# Patient Record
Sex: FEMALE | Race: OTHER | NOT HISPANIC OR LATINO
[De-identification: names, ages, dates, MRNs, and addresses within clinical notes are randomized per-mention and may not be internally consistent; named-entity substitution may affect disease eponyms.]

---

## 2019-04-23 ENCOUNTER — APPOINTMENT (OUTPATIENT)
Dept: CARDIOLOGY | Facility: CLINIC | Age: 59
End: 2019-04-23
Payer: COMMERCIAL

## 2019-04-23 ENCOUNTER — APPOINTMENT (OUTPATIENT)
Dept: ENDOCRINOLOGY | Facility: CLINIC | Age: 59
End: 2019-04-23
Payer: COMMERCIAL

## 2019-04-23 ENCOUNTER — RX CHANGE (OUTPATIENT)
Age: 59
End: 2019-04-23

## 2019-04-23 VITALS — SYSTOLIC BLOOD PRESSURE: 138 MMHG | DIASTOLIC BLOOD PRESSURE: 84 MMHG

## 2019-04-23 VITALS
TEMPERATURE: 98.4 F | SYSTOLIC BLOOD PRESSURE: 140 MMHG | BODY MASS INDEX: 26.4 KG/M2 | HEIGHT: 63 IN | HEART RATE: 105 BPM | DIASTOLIC BLOOD PRESSURE: 85 MMHG | WEIGHT: 149 LBS | OXYGEN SATURATION: 96 %

## 2019-04-23 DIAGNOSIS — R09.89 OTHER SPECIFIED SYMPTOMS AND SIGNS INVOLVING THE CIRCULATORY AND RESPIRATORY SYSTEMS: ICD-10-CM

## 2019-04-23 LAB — GLUCOSE BLDC GLUCOMTR-MCNC: 126

## 2019-04-23 PROCEDURE — 93925 LOWER EXTREMITY STUDY: CPT

## 2019-04-23 PROCEDURE — 93880 EXTRACRANIAL BILAT STUDY: CPT

## 2019-04-23 PROCEDURE — 83036 HEMOGLOBIN GLYCOSYLATED A1C: CPT | Mod: QW

## 2019-04-23 PROCEDURE — 36415 COLL VENOUS BLD VENIPUNCTURE: CPT

## 2019-04-23 PROCEDURE — 82962 GLUCOSE BLOOD TEST: CPT

## 2019-04-23 PROCEDURE — 99204 OFFICE O/P NEW MOD 45 MIN: CPT | Mod: 25

## 2019-04-28 LAB
25(OH)D3 SERPL-MCNC: 53.8 NG/ML
ALBUMIN SERPL ELPH-MCNC: 4.9 G/DL
ALP BLD-CCNC: 100 U/L
ALT SERPL-CCNC: 23 U/L
ANION GAP SERPL CALC-SCNC: 15 MMOL/L
AST SERPL-CCNC: 15 U/L
BASOPHILS # BLD AUTO: 0.04 K/UL
BASOPHILS NFR BLD AUTO: 0.4 %
BILIRUB SERPL-MCNC: 0.3 MG/DL
BUN SERPL-MCNC: 21 MG/DL
CALCIUM SERPL-MCNC: 10.3 MG/DL
CHLORIDE SERPL-SCNC: 107 MMOL/L
CO2 SERPL-SCNC: 24 MMOL/L
CREAT SERPL-MCNC: 0.92 MG/DL
CREAT SPEC-SCNC: 107 MG/DL
EOSINOPHIL # BLD AUTO: 0.08 K/UL
EOSINOPHIL NFR BLD AUTO: 0.9 %
ESTIMATED AVERAGE GLUCOSE: 180 MG/DL
FRUCTOSAMINE SERPL-MCNC: 328 UMOL/L
GLUCOSE SERPL-MCNC: 128 MG/DL
GLYCOMARK.: 5.2 UG/ML
HBA1C MFR BLD HPLC: 7.9 %
HBA1C MFR BLD HPLC: 8
HCT VFR BLD CALC: 40.9 %
HDLC SERPL-MCNC: 54 MG/DL
HGB BLD-MCNC: 13.3 G/DL
IMM GRANULOCYTES NFR BLD AUTO: 0.4 %
LDLC SERPL DIRECT ASSAY-MCNC: 115 MG/DL
LYMPHOCYTES # BLD AUTO: 1.52 K/UL
LYMPHOCYTES NFR BLD AUTO: 16.4 %
MAN DIFF?: NORMAL
MCHC RBC-ENTMCNC: 28.9 PG
MCHC RBC-ENTMCNC: 32.5 GM/DL
MCV RBC AUTO: 88.9 FL
MICROALBUMIN 24H UR DL<=1MG/L-MCNC: 2.8 MG/DL
MICROALBUMIN/CREAT 24H UR-RTO: 26 MG/G
MONOCYTES # BLD AUTO: 0.68 K/UL
MONOCYTES NFR BLD AUTO: 7.3 %
NEUTROPHILS # BLD AUTO: 6.92 K/UL
NEUTROPHILS NFR BLD AUTO: 74.6 %
PLATELET # BLD AUTO: 350 K/UL
POTASSIUM SERPL-SCNC: 4.4 MMOL/L
PROT SERPL-MCNC: 7.8 G/DL
RBC # BLD: 4.6 M/UL
RBC # FLD: 14.8 %
SODIUM SERPL-SCNC: 146 MMOL/L
T4 FREE SERPL-MCNC: 1.1 NG/DL
TRIGL SERPL-MCNC: 112 MG/DL
TSH SERPL-ACNC: 1.64 UIU/ML
WBC # FLD AUTO: 9.28 K/UL

## 2019-05-06 ENCOUNTER — RECORD ABSTRACTING (OUTPATIENT)
Age: 59
End: 2019-05-06

## 2019-05-06 DIAGNOSIS — E66.3 OVERWEIGHT: ICD-10-CM

## 2019-05-06 DIAGNOSIS — Z87.898 PERSONAL HISTORY OF OTHER SPECIFIED CONDITIONS: ICD-10-CM

## 2019-05-06 DIAGNOSIS — Z80.42 FAMILY HISTORY OF MALIGNANT NEOPLASM OF PROSTATE: ICD-10-CM

## 2019-05-06 DIAGNOSIS — Z83.3 FAMILY HISTORY OF DIABETES MELLITUS: ICD-10-CM

## 2019-05-06 DIAGNOSIS — M79.603 PAIN IN ARM, UNSPECIFIED: ICD-10-CM

## 2019-08-06 ENCOUNTER — APPOINTMENT (OUTPATIENT)
Dept: ENDOCRINOLOGY | Facility: CLINIC | Age: 59
End: 2019-08-06
Payer: COMMERCIAL

## 2019-08-06 VITALS
WEIGHT: 149 LBS | SYSTOLIC BLOOD PRESSURE: 150 MMHG | HEART RATE: 96 BPM | OXYGEN SATURATION: 97 % | HEIGHT: 63 IN | DIASTOLIC BLOOD PRESSURE: 90 MMHG | BODY MASS INDEX: 26.4 KG/M2

## 2019-08-06 VITALS — DIASTOLIC BLOOD PRESSURE: 78 MMHG | SYSTOLIC BLOOD PRESSURE: 122 MMHG

## 2019-08-06 DIAGNOSIS — Z13.820 ENCOUNTER FOR SCREENING FOR OSTEOPOROSIS: ICD-10-CM

## 2019-08-06 LAB
ALBUMIN: 30
CREATININE: 100
GLUCOSE BLDC GLUCOMTR-MCNC: 88
HBA1C MFR BLD HPLC: 7.8
MICROALBUMIN/CREAT UR TEST STR-RTO: <30

## 2019-08-06 PROCEDURE — 36415 COLL VENOUS BLD VENIPUNCTURE: CPT

## 2019-08-06 PROCEDURE — 83036 HEMOGLOBIN GLYCOSYLATED A1C: CPT | Mod: QW

## 2019-08-06 PROCEDURE — 82962 GLUCOSE BLOOD TEST: CPT

## 2019-08-06 PROCEDURE — 82044 UR ALBUMIN SEMIQUANTITATIVE: CPT | Mod: QW

## 2019-08-06 PROCEDURE — 99214 OFFICE O/P EST MOD 30 MIN: CPT | Mod: 25

## 2019-08-06 RX ORDER — VITAMIN K2 90 MCG
125 MCG CAPSULE ORAL
Qty: 30 | Refills: 2 | Status: ACTIVE | COMMUNITY
Start: 2019-08-06

## 2019-08-06 RX ORDER — METFORMIN HYDROCHLORIDE 500 MG/1
500 TABLET, COATED ORAL DAILY
Refills: 0 | Status: DISCONTINUED | COMMUNITY
End: 2019-08-06

## 2019-08-28 LAB
25(OH)D3 SERPL-MCNC: 38.7 NG/ML
ALBUMIN SERPL ELPH-MCNC: 4.7 G/DL
ALP BLD-CCNC: 89 U/L
ALT SERPL-CCNC: 15 U/L
ANION GAP SERPL CALC-SCNC: 11 MMOL/L
AST SERPL-CCNC: 13 U/L
BILIRUB SERPL-MCNC: 0.3 MG/DL
BUN SERPL-MCNC: 17 MG/DL
CALCIUM SERPL-MCNC: 9.9 MG/DL
CHLORIDE SERPL-SCNC: 104 MMOL/L
CO2 SERPL-SCNC: 27 MMOL/L
CREAT SERPL-MCNC: 0.94 MG/DL
CREAT SPEC-SCNC: 108 MG/DL
ESTIMATED AVERAGE GLUCOSE: 154 MG/DL
FRUCTOSAMINE SERPL-MCNC: 273 UMOL/L
GLUCOSE SERPL-MCNC: 92 MG/DL
GLYCOMARK.: 11.4 UG/ML
HBA1C MFR BLD HPLC: 7 %
HDLC SERPL-MCNC: 47 MG/DL
LDLC SERPL DIRECT ASSAY-MCNC: 103 MG/DL
MICROALBUMIN 24H UR DL<=1MG/L-MCNC: 1.4 MG/DL
MICROALBUMIN/CREAT 24H UR-RTO: 13 MG/G
POTASSIUM SERPL-SCNC: 4.7 MMOL/L
PROT SERPL-MCNC: 7.7 G/DL
SODIUM SERPL-SCNC: 142 MMOL/L
T4 FREE SERPL-MCNC: 1 NG/DL
TRIGL SERPL-MCNC: 147 MG/DL
TSH SERPL-ACNC: 1.78 UIU/ML

## 2019-09-01 NOTE — PHYSICAL EXAM
[Alert] : alert [Well Nourished] : well nourished [No Acute Distress] : no acute distress [Well Developed] : well developed [Normal Sclera/Conjunctiva] : normal sclera/conjunctiva [EOMI] : extra ocular movement intact [No Proptosis] : no proptosis [Thyroid Not Enlarged] : the thyroid was not enlarged [Normal Oropharynx] : the oropharynx was normal [No Thyroid Nodules] : there were no palpable thyroid nodules [No Respiratory Distress] : no respiratory distress [No Accessory Muscle Use] : no accessory muscle use [Clear to Auscultation] : lungs were clear to auscultation bilaterally [Normal Rate] : heart rate was normal  [Normal S1, S2] : normal S1 and S2 [Pedal Pulses Normal] : the pedal pulses are present [Regular Rhythm] : with a regular rhythm [No Edema] : there was no peripheral edema [Normal Bowel Sounds] : normal bowel sounds [Soft] : abdomen soft [Not Tender] : non-tender [Not Distended] : not distended [Post Cervical Nodes] : posterior cervical nodes [Anterior Cervical Nodes] : anterior cervical nodes [Axillary Nodes] : axillary nodes [Normal] : normal and non tender [No Spinal Tenderness] : no spinal tenderness [No Stigmata of Cushings Syndrome] : no stigmata of cushings syndrome [Spine Straight] : spine straight [Normal Gait] : normal gait [No Rash] : no rash [Normal Strength/Tone] : muscle strength and tone were normal [Normal Reflexes] : deep tendon reflexes were 2+ and symmetric [No Tremors] : no tremors [Oriented x3] : oriented to person, place, and time [Acanthosis Nigricans] : no acanthosis nigricans

## 2019-09-01 NOTE — HISTORY OF PRESENT ILLNESS
[FreeTextEntry1] :    returns today in follow up with regard to a history of type 2 diabetes mellitus.  There is no known history of retinopathy, nephropathy. She too denies any history of neuropathy.  Last Hba1c returned at 7.9%  and POCT bg 88 mg/dl  .  Current dm medication include metformin er 750 mg  two tablet daily  .  HGM of late has shown values to be running prior to meal 's and prior to lunch high 90's . There has been no significant hypoglycemia. She denies any chest pain, sob, neurologic or ophthalmologic complaints. She too denies any new podiatric concerns. She is up to date with her ophthalmologic visit. Additional diagnoses include of that: HTN, Hyperlipidemia \par Is on jesus red, vit d and  ubiquinol\par Ophthalmologist appt.  8/30/19

## 2019-09-12 ENCOUNTER — RX RENEWAL (OUTPATIENT)
Age: 59
End: 2019-09-12

## 2020-01-02 ENCOUNTER — APPOINTMENT (OUTPATIENT)
Dept: CARDIOLOGY | Facility: CLINIC | Age: 60
End: 2020-01-02
Payer: COMMERCIAL

## 2020-01-02 VITALS
HEART RATE: 112 BPM | DIASTOLIC BLOOD PRESSURE: 90 MMHG | SYSTOLIC BLOOD PRESSURE: 136 MMHG | TEMPERATURE: 98.25 F | WEIGHT: 149 LBS | OXYGEN SATURATION: 99 % | BODY MASS INDEX: 26.4 KG/M2 | HEIGHT: 63 IN

## 2020-01-02 DIAGNOSIS — R82.90 UNSPECIFIED ABNORMAL FINDINGS IN URINE: ICD-10-CM

## 2020-01-02 DIAGNOSIS — R05 COUGH: ICD-10-CM

## 2020-01-02 DIAGNOSIS — Z87.09 PERSONAL HISTORY OF OTHER DISEASES OF THE RESPIRATORY SYSTEM: ICD-10-CM

## 2020-01-02 PROCEDURE — 99204 OFFICE O/P NEW MOD 45 MIN: CPT

## 2020-01-02 NOTE — PHYSICAL EXAM
[General Appearance - Well Developed] : well developed [Normal Appearance] : normal appearance [Well Groomed] : well groomed [General Appearance - Well Nourished] : well nourished [General Appearance - In No Acute Distress] : no acute distress [No Deformities] : no deformities [Normal Conjunctiva] : the conjunctiva exhibited no abnormalities [Eyelids - No Xanthelasma] : the eyelids demonstrated no xanthelasmas [Normal Oral Mucosa] : normal oral mucosa [No Oral Pallor] : no oral pallor [No Oral Cyanosis] : no oral cyanosis [Normal Jugular Venous A Waves Present] : normal jugular venous A waves present [Normal Jugular Venous V Waves Present] : normal jugular venous V waves present [No Jugular Venous Churchill A Waves] : no jugular venous churchill A waves [Heart Rate And Rhythm] : heart rate and rhythm were normal [Murmurs] : no murmurs present [Heart Sounds] : normal S1 and S2 [Respiration, Rhythm And Depth] : normal respiratory rhythm and effort [Exaggerated Use Of Accessory Muscles For Inspiration] : no accessory muscle use [Abdomen Soft] : soft [Auscultation Breath Sounds / Voice Sounds] : lungs were clear to auscultation bilaterally [Abdomen Tenderness] : non-tender [Abdomen Mass (___ Cm)] : no abdominal mass palpated [Abnormal Walk] : normal gait [Nail Clubbing] : no clubbing of the fingernails [Gait - Sufficient For Exercise Testing] : the gait was sufficient for exercise testing [Cyanosis, Localized] : no localized cyanosis [Petechial Hemorrhages (___cm)] : no petechial hemorrhages [] : no rash [Skin Color & Pigmentation] : normal skin color and pigmentation [No Venous Stasis] : no venous stasis [Skin Lesions] : no skin lesions [No Skin Ulcers] : no skin ulcer [No Xanthoma] : no  xanthoma was observed [Affect] : the affect was normal [Oriented To Time, Place, And Person] : oriented to person, place, and time [Mood] : the mood was normal [No Anxiety] : not feeling anxious

## 2020-01-03 LAB
ALBUMIN SERPL ELPH-MCNC: 4.6 G/DL
ALP BLD-CCNC: 102 U/L
ALT SERPL-CCNC: 15 U/L
ANION GAP SERPL CALC-SCNC: 15 MMOL/L
APPEARANCE: CLEAR
AST SERPL-CCNC: 11 U/L
BACTERIA UR CULT: NORMAL
BACTERIA: NEGATIVE
BASOPHILS # BLD AUTO: 0.05 K/UL
BASOPHILS NFR BLD AUTO: 0.5 %
BILIRUB DIRECT SERPL-MCNC: 0 MG/DL
BILIRUB INDIRECT SERPL-MCNC: 0.1 MG/DL
BILIRUB SERPL-MCNC: 0.2 MG/DL
BILIRUBIN URINE: NEGATIVE
BLOOD URINE: NEGATIVE
BUN SERPL-MCNC: 23 MG/DL
CALCIUM SERPL-MCNC: 9.8 MG/DL
CHLORIDE SERPL-SCNC: 104 MMOL/L
CHOLEST SERPL-MCNC: 295 MG/DL
CHOLEST/HDLC SERPL: 5.4 RATIO
CK SERPL-CCNC: 96 U/L
CO2 SERPL-SCNC: 25 MMOL/L
COLOR: NORMAL
CREAT SERPL-MCNC: 0.93 MG/DL
EOSINOPHIL # BLD AUTO: 0.11 K/UL
EOSINOPHIL NFR BLD AUTO: 1.1 %
ESTIMATED AVERAGE GLUCOSE: 160 MG/DL
GLUCOSE QUALITATIVE U: NEGATIVE
GLUCOSE SERPL-MCNC: 122 MG/DL
HBA1C MFR BLD HPLC: 7.2 %
HCT VFR BLD CALC: 43.4 %
HDLC SERPL-MCNC: 55 MG/DL
HGB BLD-MCNC: 13.8 G/DL
HYALINE CASTS: 1 /LPF
IMM GRANULOCYTES NFR BLD AUTO: 0.5 %
KETONES URINE: NEGATIVE
LDLC SERPL CALC-MCNC: 196 MG/DL
LDLC SERPL DIRECT ASSAY-MCNC: 207 MG/DL
LEUKOCYTE ESTERASE URINE: NEGATIVE
LYMPHOCYTES # BLD AUTO: 1.44 K/UL
LYMPHOCYTES NFR BLD AUTO: 14 %
MAN DIFF?: NORMAL
MCHC RBC-ENTMCNC: 28.3 PG
MCHC RBC-ENTMCNC: 31.8 GM/DL
MCV RBC AUTO: 88.9 FL
MICROSCOPIC-UA: NORMAL
MONOCYTES # BLD AUTO: 0.93 K/UL
MONOCYTES NFR BLD AUTO: 9 %
NEUTROPHILS # BLD AUTO: 7.73 K/UL
NEUTROPHILS NFR BLD AUTO: 74.9 %
NITRITE URINE: NEGATIVE
PH URINE: 5.5
PLATELET # BLD AUTO: 351 K/UL
POTASSIUM SERPL-SCNC: 4.4 MMOL/L
PROT SERPL-MCNC: 7.8 G/DL
PROTEIN URINE: NEGATIVE
RBC # BLD: 4.88 M/UL
RBC # FLD: 15.7 %
RED BLOOD CELLS URINE: 0 /HPF
SODIUM SERPL-SCNC: 144 MMOL/L
SPECIFIC GRAVITY URINE: 1.02
SQUAMOUS EPITHELIAL CELLS: 1 /HPF
TRIGL SERPL-MCNC: 221 MG/DL
UROBILINOGEN URINE: NORMAL
WBC # FLD AUTO: 10.31 K/UL
WHITE BLOOD CELLS URINE: 1 /HPF

## 2020-01-03 NOTE — HISTORY OF PRESENT ILLNESS
[FreeTextEntry1] : Pt presents today for an acute visit. She states a couple days ago she started experiencing sore throat and painful swallowing. Pt denies fever but states she had chills the other day. \par The patient presents for evaluation of high blood pressure. Patient is currently tolerating the current antihypertensive regime and they deny headaches, stiff neck, visual changes, pedal Edema or PND. They also are here for follow-up of elevated cholesterol and continued care of diabetes mellitus. Patient is currently tolerating medication and denies muscle pain, joint pain, back pain, tea, nausea, vomiting, abdominal pain or diarrhea. The patient is trying to follow a low cholesterol diet.  The patient is following the diabetic regimen and is tolerating hypoglycemic agents and following the diet.\par Pt also states for the past few weeks she noticed her urine had an abnormal smell. \par Pt states she took nightime cough medicine last night which may attribute to elevated BP. \par \par

## 2020-01-06 LAB — BACTERIA THROAT CULT: NORMAL

## 2020-01-21 ENCOUNTER — APPOINTMENT (OUTPATIENT)
Dept: ENDOCRINOLOGY | Facility: CLINIC | Age: 60
End: 2020-01-21
Payer: COMMERCIAL

## 2020-01-21 ENCOUNTER — APPOINTMENT (OUTPATIENT)
Dept: CARDIOLOGY | Facility: CLINIC | Age: 60
End: 2020-01-21

## 2020-01-21 VITALS
DIASTOLIC BLOOD PRESSURE: 90 MMHG | OXYGEN SATURATION: 99 % | HEIGHT: 63 IN | SYSTOLIC BLOOD PRESSURE: 130 MMHG | TEMPERATURE: 98.6 F | HEART RATE: 103 BPM | WEIGHT: 149 LBS | BODY MASS INDEX: 26.4 KG/M2

## 2020-01-21 VITALS — SYSTOLIC BLOOD PRESSURE: 118 MMHG | DIASTOLIC BLOOD PRESSURE: 84 MMHG

## 2020-01-21 PROCEDURE — 82962 GLUCOSE BLOOD TEST: CPT

## 2020-01-21 PROCEDURE — 36415 COLL VENOUS BLD VENIPUNCTURE: CPT

## 2020-01-21 PROCEDURE — 99214 OFFICE O/P EST MOD 30 MIN: CPT | Mod: 25

## 2020-01-21 PROCEDURE — 95250 CONT GLUC MNTR PHYS/QHP EQP: CPT

## 2020-01-28 ENCOUNTER — FORM ENCOUNTER (OUTPATIENT)
Age: 60
End: 2020-01-28

## 2020-01-29 ENCOUNTER — OUTPATIENT (OUTPATIENT)
Dept: OUTPATIENT SERVICES | Facility: HOSPITAL | Age: 60
LOS: 1 days | End: 2020-01-29
Payer: COMMERCIAL

## 2020-01-29 ENCOUNTER — APPOINTMENT (OUTPATIENT)
Dept: CARDIOLOGY | Facility: CLINIC | Age: 60
End: 2020-01-29
Payer: COMMERCIAL

## 2020-01-29 ENCOUNTER — APPOINTMENT (OUTPATIENT)
Dept: RADIOLOGY | Facility: IMAGING CENTER | Age: 60
End: 2020-01-29
Payer: COMMERCIAL

## 2020-01-29 VITALS
HEART RATE: 98 BPM | RESPIRATION RATE: 17 BRPM | SYSTOLIC BLOOD PRESSURE: 148 MMHG | OXYGEN SATURATION: 99 % | DIASTOLIC BLOOD PRESSURE: 82 MMHG

## 2020-01-29 DIAGNOSIS — M51.16 INTERVERTEBRAL DISC DISORDERS WITH RADICULOPATHY, LUMBAR REGION: ICD-10-CM

## 2020-01-29 DIAGNOSIS — M54.42 LUMBAGO WITH SCIATICA, LEFT SIDE: ICD-10-CM

## 2020-01-29 DIAGNOSIS — E11.628 TYPE 2 DIABETES MELLITUS WITH OTHER SKIN COMPLICATIONS: ICD-10-CM

## 2020-01-29 PROCEDURE — 72110 X-RAY EXAM L-2 SPINE 4/>VWS: CPT | Mod: 26

## 2020-01-29 PROCEDURE — 72110 X-RAY EXAM L-2 SPINE 4/>VWS: CPT

## 2020-01-29 PROCEDURE — 99214 OFFICE O/P EST MOD 30 MIN: CPT

## 2020-01-31 PROBLEM — M51.16 LUMBAR DISC DISEASE WITH RADICULOPATHY: Status: ACTIVE | Noted: 2020-01-29

## 2020-01-31 NOTE — HISTORY OF PRESENT ILLNESS
[FreeTextEntry1] : Pt presents today for an acute visit. Pt experiencing a pain in her lower back radiating to left hip and thigh. The pain started 1 week ago. Pt has been taking ibuprofen for relief. Pt denies trauma or fall.   It is worse when they do certain movements with there back such as flexion hyperextension or rapid movements.  They deny any trauma swelling redness fever or chills history of gout, tic exposure or rash. Pt states the pain has improved over the last few days but she did want to be evaluated, \par The patient presents for evaluation of high blood pressure. Patient is currently tolerating the current antihypertensive regime and they deny headaches, stiff neck, visual changes, pedal Edema or PND. They also are here for follow-up of elevated cholesterol and continued care of diabetes mellitus. Patient is currently tolerating medication and denies muscle pain, joint pain, back pain, tea, nausea, vomiting, abdominal pain or diarrhea. The patient is trying to follow a low cholesterol diet.  The patient is following the diabetic regimen and is tolerating hypoglycemic agents and following the diet.\par

## 2020-01-31 NOTE — PHYSICAL EXAM
[General Appearance - Well Developed] : well developed [Normal Appearance] : normal appearance [No Deformities] : no deformities [Well Groomed] : well groomed [General Appearance - Well Nourished] : well nourished [General Appearance - In No Acute Distress] : no acute distress [Normal Conjunctiva] : the conjunctiva exhibited no abnormalities [Eyelids - No Xanthelasma] : the eyelids demonstrated no xanthelasmas [Normal Oral Mucosa] : normal oral mucosa [No Oral Cyanosis] : no oral cyanosis [No Oral Pallor] : no oral pallor [Normal Jugular Venous V Waves Present] : normal jugular venous V waves present [Normal Jugular Venous A Waves Present] : normal jugular venous A waves present [No Jugular Venous Churchill A Waves] : no jugular venous churchill A waves [Respiration, Rhythm And Depth] : normal respiratory rhythm and effort [Exaggerated Use Of Accessory Muscles For Inspiration] : no accessory muscle use [Auscultation Breath Sounds / Voice Sounds] : lungs were clear to auscultation bilaterally [Abdomen Tenderness] : non-tender [Abdomen Soft] : soft [Abdomen Mass (___ Cm)] : no abdominal mass palpated [Abnormal Walk] : normal gait [Cyanosis, Localized] : no localized cyanosis [Nail Clubbing] : no clubbing of the fingernails [Gait - Sufficient For Exercise Testing] : the gait was sufficient for exercise testing [Petechial Hemorrhages (___cm)] : no petechial hemorrhages [No Venous Stasis] : no venous stasis [Skin Color & Pigmentation] : normal skin color and pigmentation [] : no rash [Skin Lesions] : no skin lesions [No Skin Ulcers] : no skin ulcer [No Xanthoma] : no  xanthoma was observed [Oriented To Time, Place, And Person] : oriented to person, place, and time [Affect] : the affect was normal [Mood] : the mood was normal [No Anxiety] : not feeling anxious [FreeTextEntry1] : Regular rate and rhythm, NL S1, S2, non-displaced PMI, chest non-tender; no rubs,heaves  or gallops a  Grade 2/6 systolic murmur noted at the LSB

## 2020-02-03 LAB
25(OH)D3 SERPL-MCNC: 40.5 NG/ML
BASOPHILS # BLD AUTO: 0.04 K/UL
BASOPHILS NFR BLD AUTO: 0.6 %
EOSINOPHIL # BLD AUTO: 0.1 K/UL
EOSINOPHIL NFR BLD AUTO: 1.6 %
GLUCOSE BLDC GLUCOMTR-MCNC: 81
HCT VFR BLD CALC: 41.8 %
HGB BLD-MCNC: 13.2 G/DL
IMM GRANULOCYTES NFR BLD AUTO: 0.3 %
LYMPHOCYTES # BLD AUTO: 1.45 K/UL
LYMPHOCYTES NFR BLD AUTO: 22.7 %
MAN DIFF?: NORMAL
MCHC RBC-ENTMCNC: 28.2 PG
MCHC RBC-ENTMCNC: 31.6 GM/DL
MCV RBC AUTO: 89.3 FL
MONOCYTES # BLD AUTO: 0.5 K/UL
MONOCYTES NFR BLD AUTO: 7.8 %
NEUTROPHILS # BLD AUTO: 4.29 K/UL
NEUTROPHILS NFR BLD AUTO: 67 %
PLATELET # BLD AUTO: 355 K/UL
RBC # BLD: 4.68 M/UL
RBC # FLD: 14.9 %
T3FREE SERPL-MCNC: 3.08 PG/ML
T4 FREE SERPL-MCNC: 1.1 NG/DL
TSH SERPL-ACNC: 2.7 UIU/ML
UBIQUINONE10 SERPL-MCNC: 2.1 MG/L
VIT B12 SERPL-MCNC: 508 PG/ML
WBC # FLD AUTO: 6.4 K/UL

## 2020-02-06 NOTE — HISTORY OF PRESENT ILLNESS
[FreeTextEntry1] :  is a 59 year old female who  returns today in follow up with regard to a history of type 2 diabetes mellitus.  There is no known history of retinopathy, nephropathy. She too denies any history of neuropathy. .  Current DM medication include Metformin  mg  two tablet daily, but patient has only been taking one per day. Pt checks BG levels at home 1-2 times daily. HGM of late has shown values to be running  mg/dL. There has been no significant hypoglycemia. She denies any chest pain, sob, neurologic or ophthalmologic complaints. She too denies any new podiatric concerns. She is up to date with her ophthalmologic visit.\par Additional diagnoses include of that: HTN, Hyperlipidemia. Taking Rosuvastatin 5 mg and Losartan 100 mg daily.\par Is on jesus red, vit d and  ubiquinol,  Patient follows with Dr. Eid. \par Last  ophthalmologist appt.  October /November no retinopathy. + cataracts.\par POCT glucose returned today at 81 mg/dL fasting. \par Some muscle aches.  Occasional nausea which was better somewhat off the rosuvastatin.\par POCT A1c returned today at 7.3%, previously 7.2% in August 2019\par \par Last labs done with Dr. Eid 1-2-2020\par

## 2020-02-06 NOTE — PHYSICAL EXAM
[No Acute Distress] : no acute distress [Alert] : alert [Well Developed] : well developed [Normal Sclera/Conjunctiva] : normal sclera/conjunctiva [Well Nourished] : well nourished [No Proptosis] : no proptosis [EOMI] : extra ocular movement intact [Thyroid Not Enlarged] : the thyroid was not enlarged [No Thyroid Nodules] : there were no palpable thyroid nodules [Normal Oropharynx] : the oropharynx was normal [No Accessory Muscle Use] : no accessory muscle use [No Respiratory Distress] : no respiratory distress [Normal Rate] : heart rate was normal  [Normal S1, S2] : normal S1 and S2 [Clear to Auscultation] : lungs were clear to auscultation bilaterally [Pedal Pulses Normal] : the pedal pulses are present [Regular Rhythm] : with a regular rhythm [Normal Bowel Sounds] : normal bowel sounds [No Edema] : there was no peripheral edema [Soft] : abdomen soft [Not Tender] : non-tender [Not Distended] : not distended [Post Cervical Nodes] : posterior cervical nodes [Anterior Cervical Nodes] : anterior cervical nodes [Normal] : normal and non tender [Axillary Nodes] : axillary nodes [No Spinal Tenderness] : no spinal tenderness [Spine Straight] : spine straight [Normal Gait] : normal gait [No Stigmata of Cushings Syndrome] : no stigmata of cushings syndrome [Normal Strength/Tone] : muscle strength and tone were normal [No Rash] : no rash [Normal Reflexes] : deep tendon reflexes were 2+ and symmetric [No Tremors] : no tremors [Oriented x3] : oriented to person, place, and time [Acanthosis Nigricans] : no acanthosis nigricans

## 2020-03-06 RX ORDER — LANCETS 33 GAUGE
EACH MISCELLANEOUS
Qty: 3 | Refills: 3 | Status: ACTIVE | COMMUNITY
Start: 2020-03-05 | End: 1900-01-01

## 2020-06-30 ENCOUNTER — APPOINTMENT (OUTPATIENT)
Dept: ENDOCRINOLOGY | Facility: CLINIC | Age: 60
End: 2020-06-30
Payer: COMMERCIAL

## 2020-06-30 VITALS
SYSTOLIC BLOOD PRESSURE: 128 MMHG | HEIGHT: 63 IN | DIASTOLIC BLOOD PRESSURE: 90 MMHG | HEART RATE: 104 BPM | OXYGEN SATURATION: 99 % | TEMPERATURE: 98.9 F | BODY MASS INDEX: 26.58 KG/M2 | WEIGHT: 150 LBS

## 2020-06-30 PROCEDURE — 99214 OFFICE O/P EST MOD 30 MIN: CPT | Mod: 25

## 2020-06-30 PROCEDURE — 83036 HEMOGLOBIN GLYCOSYLATED A1C: CPT | Mod: QW

## 2020-06-30 PROCEDURE — 36415 COLL VENOUS BLD VENIPUNCTURE: CPT

## 2020-06-30 PROCEDURE — 95250 CONT GLUC MNTR PHYS/QHP EQP: CPT

## 2020-06-30 NOTE — PHYSICAL EXAM
[Alert] : alert [Well Nourished] : well nourished [No Acute Distress] : no acute distress [Well Developed] : well developed [Normal Sclera/Conjunctiva] : normal sclera/conjunctiva [EOMI] : extra ocular movement intact [No Proptosis] : no proptosis [Normal Oropharynx] : the oropharynx was normal [Thyroid Not Enlarged] : the thyroid was not enlarged [No Thyroid Nodules] : no palpable thyroid nodules [No Respiratory Distress] : no respiratory distress [No Accessory Muscle Use] : no accessory muscle use [Clear to Auscultation] : lungs were clear to auscultation bilaterally [Normal S1, S2] : normal S1 and S2 [Normal Rate] : heart rate was normal [Pedal Pulses Normal] : the pedal pulses are present [No Edema] : no peripheral edema [Regular Rhythm] : with a regular rhythm [Normal Bowel Sounds] : normal bowel sounds [Not Tender] : non-tender [Not Distended] : not distended [Soft] : abdomen soft [Normal Anterior Cervical Nodes] : no anterior cervical lymphadenopathy [No Spinal Tenderness] : no spinal tenderness [Normal Posterior Cervical Nodes] : no posterior cervical lymphadenopathy [Spine Straight] : spine straight [No Stigmata of Cushings Syndrome] : no stigmata of Cushings Syndrome [Normal Strength/Tone] : muscle strength and tone were normal [Normal Gait] : normal gait [No Rash] : no rash [No Tremors] : no tremors [Normal Reflexes] : deep tendon reflexes were 2+ and symmetric [Oriented x3] : oriented to person, place, and time [Acanthosis Nigricans] : no acanthosis nigricans

## 2020-06-30 NOTE — HISTORY OF PRESENT ILLNESS
[FreeTextEntry1] :  is a 60 year old female who  returns today in follow up with regard to a history of type 2 diabetes mellitus.  There is no known history of retinopathy, nephropathy. She too denies any history of neuropathy. .  Current DM medication include Metformin  mg  two tablet daily, but patient has only been taking one per day. . HGM of late has shown values to be running   low to mid 100's      There has been no significant hypoglycemia. She denies any chest pain, sob, neurologic or ophthalmologic complaints. She too denies any new podiatric concerns. She is up to date with her ophthalmologic visit.\par Additional diagnoses include of that: HTN, Hyperlipidemia. Taking Rosuvastatin 5 mg and Losartan 100 mg daily.\par Is on jesus red, vit d  5,000 iuand  ubiquinol,  Patient follows with Dr. Eid. \par Last  ophthalmologist appt.  October /November no retinopathy. + cataracts.\par A1c at 8.7%\par \par \par

## 2020-07-08 ENCOUNTER — RX RENEWAL (OUTPATIENT)
Age: 60
End: 2020-07-08

## 2020-07-15 LAB
25(OH)D3 SERPL-MCNC: 36.3 NG/ML
ALBUMIN SERPL ELPH-MCNC: 5 G/DL
ALP BLD-CCNC: 99 U/L
ALT SERPL-CCNC: 24 U/L
ANION GAP SERPL CALC-SCNC: 12 MMOL/L
AST SERPL-CCNC: 19 U/L
BILIRUB SERPL-MCNC: 0.3 MG/DL
BUN SERPL-MCNC: 16 MG/DL
CALCIUM SERPL-MCNC: 10.1 MG/DL
CHLORIDE SERPL-SCNC: 105 MMOL/L
CHOLEST SERPL-MCNC: 157 MG/DL
CO2 SERPL-SCNC: 26 MMOL/L
CREAT SERPL-MCNC: 0.98 MG/DL
CREAT SPEC-SCNC: 112 MG/DL
ESTIMATED AVERAGE GLUCOSE: 171 MG/DL
FRUCTOSAMINE SERPL-MCNC: 297 UMOL/L
GLUCOSE BLDC GLUCOMTR-MCNC: 110
GLUCOSE SERPL-MCNC: 98 MG/DL
GLYCOMARK.: 6.8 UG/ML
HBA1C MFR BLD HPLC: 7.6 %
HBA1C MFR BLD HPLC: 8.7
HDLC SERPL-MCNC: 46 MG/DL
LDLC SERPL DIRECT ASSAY-MCNC: 91 MG/DL
MICROALBUMIN 24H UR DL<=1MG/L-MCNC: 1.5 MG/DL
MICROALBUMIN/CREAT 24H UR-RTO: 13 MG/G
POTASSIUM SERPL-SCNC: 4.8 MMOL/L
PROT SERPL-MCNC: 7.6 G/DL
SODIUM SERPL-SCNC: 143 MMOL/L
T3FREE SERPL-MCNC: 2.82 PG/ML
T4 FREE SERPL-MCNC: 1 NG/DL
TRIGL SERPL-MCNC: 111 MG/DL
TSH SERPL-ACNC: 1.3 UIU/ML

## 2020-10-21 ENCOUNTER — NON-APPOINTMENT (OUTPATIENT)
Age: 60
End: 2020-10-21

## 2020-12-07 ENCOUNTER — APPOINTMENT (OUTPATIENT)
Dept: ENDOCRINOLOGY | Facility: CLINIC | Age: 60
End: 2020-12-07
Payer: COMMERCIAL

## 2020-12-07 VITALS
OXYGEN SATURATION: 99 % | BODY MASS INDEX: 35.41 KG/M2 | DIASTOLIC BLOOD PRESSURE: 88 MMHG | HEIGHT: 55 IN | TEMPERATURE: 98.5 F | SYSTOLIC BLOOD PRESSURE: 142 MMHG | WEIGHT: 153 LBS | HEART RATE: 99 BPM

## 2020-12-07 VITALS — SYSTOLIC BLOOD PRESSURE: 140 MMHG | DIASTOLIC BLOOD PRESSURE: 76 MMHG

## 2020-12-07 PROCEDURE — 83036 HEMOGLOBIN GLYCOSYLATED A1C: CPT | Mod: QW

## 2020-12-07 PROCEDURE — 99214 OFFICE O/P EST MOD 30 MIN: CPT | Mod: 25

## 2020-12-07 PROCEDURE — 99072 ADDL SUPL MATRL&STAF TM PHE: CPT

## 2020-12-07 PROCEDURE — 82962 GLUCOSE BLOOD TEST: CPT

## 2020-12-07 PROCEDURE — 36415 COLL VENOUS BLD VENIPUNCTURE: CPT

## 2020-12-07 NOTE — HISTORY OF PRESENT ILLNESS
[FreeTextEntry1] :  is a 60 year old female who  returns today in follow up with regard to a history of type 2 diabetes mellitus.  There is no known history of retinopathy, nephropathy. She too denies any history of neuropathy. .  Current DM medication include Metformin  mg  two tablet daily, but patient has only been taking one per day. in light of Gi upset. HGM  tested 2 x per day of late has shown values to be running  am 120's -140's on occasion and other pre meal times similar.   There has been no significant hypoglycemia. She denies any chest pain, sob, neurologic or ophthalmologic complaints. She too denies any new podiatric concerns. She is up to date with her ophthalmologic visit.\par Additional diagnoses include of that: HTN, Hyperlipidemia. Taking Rosuvastatin 5 mg and Losartan 100 mg daily.\par Is on jesus red, vit d  5,000 iu dn quinol with  Tumeric  and Probiotic. Patient follows with Dr. Eid. \par UTD with optho\par \par \par \par

## 2020-12-18 ENCOUNTER — NON-APPOINTMENT (OUTPATIENT)
Age: 60
End: 2020-12-18

## 2020-12-18 LAB
25(OH)D3 SERPL-MCNC: 65.6 NG/ML
ALBUMIN SERPL ELPH-MCNC: 4.6 G/DL
ALP BLD-CCNC: 101 U/L
ALT SERPL-CCNC: 17 U/L
ANION GAP SERPL CALC-SCNC: 9 MMOL/L
AST SERPL-CCNC: 16 U/L
BILIRUB SERPL-MCNC: 0.3 MG/DL
BUN SERPL-MCNC: 17 MG/DL
CALCIUM SERPL-MCNC: 10.2 MG/DL
CHLORIDE SERPL-SCNC: 104 MMOL/L
CHOLEST SERPL-MCNC: 188 MG/DL
CO2 SERPL-SCNC: 28 MMOL/L
CREAT SERPL-MCNC: 0.93 MG/DL
CREAT SPEC-SCNC: 97 MG/DL
ESTIMATED AVERAGE GLUCOSE: 166 MG/DL
FOLATE SERPL-MCNC: 10.7 NG/ML
FRUCTOSAMINE SERPL-MCNC: 310 UMOL/L
GLUCOSE BLDC GLUCOMTR-MCNC: 126
GLUCOSE SERPL-MCNC: 115 MG/DL
GLYCOMARK.: 5.6 UG/ML
HBA1C MFR BLD HPLC: 7.4 %
HBA1C MFR BLD HPLC: 7.9
HDLC SERPL-MCNC: 54 MG/DL
LDLC SERPL DIRECT ASSAY-MCNC: 117 MG/DL
MICROALBUMIN 24H UR DL<=1MG/L-MCNC: 1.9 MG/DL
MICROALBUMIN/CREAT 24H UR-RTO: 20 MG/G
POTASSIUM SERPL-SCNC: 4.2 MMOL/L
PROT SERPL-MCNC: 8.1 G/DL
SODIUM SERPL-SCNC: 142 MMOL/L
T3FREE SERPL-MCNC: 2.52 PG/ML
T4 FREE SERPL-MCNC: 1 NG/DL
TRIGL SERPL-MCNC: 88 MG/DL
TSH SERPL-ACNC: 3.14 UIU/ML
VIT B12 SERPL-MCNC: 395 PG/ML

## 2020-12-23 PROBLEM — Z87.09 HISTORY OF SORE THROAT: Status: RESOLVED | Noted: 2020-01-02 | Resolved: 2020-12-23

## 2021-04-13 ENCOUNTER — INPATIENT (INPATIENT)
Facility: HOSPITAL | Age: 61
LOS: 22 days | Discharge: HOME | End: 2021-05-06
Attending: INTERNAL MEDICINE | Admitting: INTERNAL MEDICINE
Payer: COMMERCIAL

## 2021-04-13 VITALS
DIASTOLIC BLOOD PRESSURE: 89 MMHG | HEART RATE: 130 BPM | RESPIRATION RATE: 24 BRPM | OXYGEN SATURATION: 96 % | SYSTOLIC BLOOD PRESSURE: 148 MMHG

## 2021-04-13 DIAGNOSIS — Z29.9 ENCOUNTER FOR PROPHYLACTIC MEASURES, UNSPECIFIED: ICD-10-CM

## 2021-04-13 DIAGNOSIS — J96.01 ACUTE RESPIRATORY FAILURE WITH HYPOXIA: ICD-10-CM

## 2021-04-13 DIAGNOSIS — U07.1 COVID-19: ICD-10-CM

## 2021-04-13 DIAGNOSIS — R09.89 OTHER SPECIFIED SYMPTOMS AND SIGNS INVOLVING THE CIRCULATORY AND RESPIRATORY SYSTEMS: ICD-10-CM

## 2021-04-13 DIAGNOSIS — I10 ESSENTIAL (PRIMARY) HYPERTENSION: ICD-10-CM

## 2021-04-13 DIAGNOSIS — E78.5 HYPERLIPIDEMIA, UNSPECIFIED: ICD-10-CM

## 2021-04-13 DIAGNOSIS — E11.9 TYPE 2 DIABETES MELLITUS WITHOUT COMPLICATIONS: ICD-10-CM

## 2021-04-13 DIAGNOSIS — F32.9 MAJOR DEPRESSIVE DISORDER, SINGLE EPISODE, UNSPECIFIED: ICD-10-CM

## 2021-04-13 LAB
A1C WITH ESTIMATED AVERAGE GLUCOSE RESULT: 7.8 % — HIGH (ref 4–5.6)
ALBUMIN SERPL ELPH-MCNC: 3.6 G/DL — SIGNIFICANT CHANGE UP (ref 3.3–5)
ALBUMIN SERPL ELPH-MCNC: 3.6 G/DL — SIGNIFICANT CHANGE UP (ref 3.3–5)
ALP SERPL-CCNC: 64 U/L — SIGNIFICANT CHANGE UP (ref 40–120)
ALP SERPL-CCNC: 66 U/L — SIGNIFICANT CHANGE UP (ref 40–120)
ALT FLD-CCNC: 32 U/L — SIGNIFICANT CHANGE UP (ref 4–33)
ALT FLD-CCNC: 39 U/L — HIGH (ref 4–33)
ANION GAP SERPL CALC-SCNC: 11 MMOL/L — SIGNIFICANT CHANGE UP (ref 7–14)
AST SERPL-CCNC: 47 U/L — HIGH (ref 4–32)
AST SERPL-CCNC: 58 U/L — HIGH (ref 4–32)
BASOPHILS # BLD AUTO: 0 K/UL — SIGNIFICANT CHANGE UP (ref 0–0.2)
BASOPHILS NFR BLD AUTO: 0 % — SIGNIFICANT CHANGE UP (ref 0–2)
BILIRUB DIRECT SERPL-MCNC: <0.2 MG/DL — SIGNIFICANT CHANGE UP (ref 0–0.2)
BILIRUB INDIRECT FLD-MCNC: >0 MG/DL — SIGNIFICANT CHANGE UP (ref 0–1)
BILIRUB SERPL-MCNC: 0.2 MG/DL — SIGNIFICANT CHANGE UP (ref 0.2–1.2)
BILIRUB SERPL-MCNC: 0.4 MG/DL — SIGNIFICANT CHANGE UP (ref 0.2–1.2)
BLOOD GAS VENOUS COMPREHENSIVE RESULT: SIGNIFICANT CHANGE UP
BUN SERPL-MCNC: 12 MG/DL — SIGNIFICANT CHANGE UP (ref 7–23)
CALCIUM SERPL-MCNC: 9 MG/DL — SIGNIFICANT CHANGE UP (ref 8.4–10.5)
CHLORIDE SERPL-SCNC: 102 MMOL/L — SIGNIFICANT CHANGE UP (ref 98–107)
CO2 SERPL-SCNC: 24 MMOL/L — SIGNIFICANT CHANGE UP (ref 22–31)
CREAT SERPL-MCNC: 0.96 MG/DL — SIGNIFICANT CHANGE UP (ref 0.5–1.3)
CREAT SERPL-MCNC: 1.03 MG/DL — SIGNIFICANT CHANGE UP (ref 0.5–1.3)
CRP SERPL-MCNC: 185.5 MG/L — HIGH
D DIMER BLD IA.RAPID-MCNC: 349 NG/ML DDU — HIGH
EOSINOPHIL # BLD AUTO: 0 K/UL — SIGNIFICANT CHANGE UP (ref 0–0.5)
EOSINOPHIL NFR BLD AUTO: 0 % — SIGNIFICANT CHANGE UP (ref 0–6)
ESTIMATED AVERAGE GLUCOSE: 177 MG/DL — HIGH (ref 68–114)
FERRITIN SERPL-MCNC: 738 NG/ML — HIGH (ref 15–150)
GLUCOSE BLDC GLUCOMTR-MCNC: 235 MG/DL — HIGH (ref 70–99)
GLUCOSE BLDC GLUCOMTR-MCNC: 302 MG/DL — HIGH (ref 70–99)
GLUCOSE SERPL-MCNC: 149 MG/DL — HIGH (ref 70–99)
HCT VFR BLD CALC: 36.7 % — SIGNIFICANT CHANGE UP (ref 34.5–45)
HGB BLD-MCNC: 12.1 G/DL — SIGNIFICANT CHANGE UP (ref 11.5–15.5)
IANC: 4.92 K/UL — SIGNIFICANT CHANGE UP (ref 1.5–8.5)
LYMPHOCYTES # BLD AUTO: 0.57 K/UL — LOW (ref 1–3.3)
LYMPHOCYTES # BLD AUTO: 8.7 % — LOW (ref 13–44)
MCHC RBC-ENTMCNC: 28.2 PG — SIGNIFICANT CHANGE UP (ref 27–34)
MCHC RBC-ENTMCNC: 33 GM/DL — SIGNIFICANT CHANGE UP (ref 32–36)
MCV RBC AUTO: 85.5 FL — SIGNIFICANT CHANGE UP (ref 80–100)
MONOCYTES # BLD AUTO: 0.4 K/UL — SIGNIFICANT CHANGE UP (ref 0–0.9)
MONOCYTES NFR BLD AUTO: 6.1 % — SIGNIFICANT CHANGE UP (ref 2–14)
NEUTROPHILS # BLD AUTO: 5.51 K/UL — SIGNIFICANT CHANGE UP (ref 1.8–7.4)
NEUTROPHILS NFR BLD AUTO: 82.6 % — HIGH (ref 43–77)
PLATELET # BLD AUTO: 254 K/UL — SIGNIFICANT CHANGE UP (ref 150–400)
POTASSIUM SERPL-MCNC: 4 MMOL/L — SIGNIFICANT CHANGE UP (ref 3.5–5.3)
POTASSIUM SERPL-SCNC: 4 MMOL/L — SIGNIFICANT CHANGE UP (ref 3.5–5.3)
PROCALCITONIN SERPL-MCNC: 0.54 NG/ML — HIGH (ref 0.02–0.1)
PROT SERPL-MCNC: 7.4 G/DL — SIGNIFICANT CHANGE UP (ref 6–8.3)
PROT SERPL-MCNC: 7.8 G/DL — SIGNIFICANT CHANGE UP (ref 6–8.3)
RBC # BLD: 4.29 M/UL — SIGNIFICANT CHANGE UP (ref 3.8–5.2)
RBC # FLD: 15 % — HIGH (ref 10.3–14.5)
SARS-COV-2 RNA SPEC QL NAA+PROBE: DETECTED
SODIUM SERPL-SCNC: 137 MMOL/L — SIGNIFICANT CHANGE UP (ref 135–145)
TROPONIN T, HIGH SENSITIVITY RESULT: 7 NG/L — SIGNIFICANT CHANGE UP
TROPONIN T, HIGH SENSITIVITY RESULT: 7 NG/L — SIGNIFICANT CHANGE UP
WBC # BLD: 6.54 K/UL — SIGNIFICANT CHANGE UP (ref 3.8–10.5)
WBC # FLD AUTO: 6.54 K/UL — SIGNIFICANT CHANGE UP (ref 3.8–10.5)

## 2021-04-13 PROCEDURE — 93010 ELECTROCARDIOGRAM REPORT: CPT

## 2021-04-13 PROCEDURE — 99291 CRITICAL CARE FIRST HOUR: CPT | Mod: 25

## 2021-04-13 PROCEDURE — 71045 X-RAY EXAM CHEST 1 VIEW: CPT | Mod: 26

## 2021-04-13 PROCEDURE — 99223 1ST HOSP IP/OBS HIGH 75: CPT

## 2021-04-13 PROCEDURE — 71275 CT ANGIOGRAPHY CHEST: CPT | Mod: 26

## 2021-04-13 RX ORDER — DEXTROSE 50 % IN WATER 50 %
15 SYRINGE (ML) INTRAVENOUS ONCE
Refills: 0 | Status: DISCONTINUED | OUTPATIENT
Start: 2021-04-13 | End: 2021-05-06

## 2021-04-13 RX ORDER — ASPIRIN/CALCIUM CARB/MAGNESIUM 324 MG
81 TABLET ORAL DAILY
Refills: 0 | Status: DISCONTINUED | OUTPATIENT
Start: 2021-04-13 | End: 2021-05-06

## 2021-04-13 RX ORDER — INSULIN LISPRO 100/ML
VIAL (ML) SUBCUTANEOUS AT BEDTIME
Refills: 0 | Status: DISCONTINUED | OUTPATIENT
Start: 2021-04-13 | End: 2021-04-18

## 2021-04-13 RX ORDER — INSULIN LISPRO 100/ML
VIAL (ML) SUBCUTANEOUS
Refills: 0 | Status: DISCONTINUED | OUTPATIENT
Start: 2021-04-13 | End: 2021-04-18

## 2021-04-13 RX ORDER — SODIUM CHLORIDE 9 MG/ML
1000 INJECTION, SOLUTION INTRAVENOUS
Refills: 0 | Status: DISCONTINUED | OUTPATIENT
Start: 2021-04-13 | End: 2021-05-06

## 2021-04-13 RX ORDER — ALBUTEROL 90 UG/1
2 AEROSOL, METERED ORAL EVERY 6 HOURS
Refills: 0 | Status: DISCONTINUED | OUTPATIENT
Start: 2021-04-13 | End: 2021-05-06

## 2021-04-13 RX ORDER — IPRATROPIUM/ALBUTEROL SULFATE 18-103MCG
3 AEROSOL WITH ADAPTER (GRAM) INHALATION EVERY 6 HOURS
Refills: 0 | Status: DISCONTINUED | OUTPATIENT
Start: 2021-04-13 | End: 2021-04-13

## 2021-04-13 RX ORDER — GLUCAGON INJECTION, SOLUTION 0.5 MG/.1ML
1 INJECTION, SOLUTION SUBCUTANEOUS ONCE
Refills: 0 | Status: DISCONTINUED | OUTPATIENT
Start: 2021-04-13 | End: 2021-05-06

## 2021-04-13 RX ORDER — MAGNESIUM SULFATE 500 MG/ML
2 VIAL (ML) INJECTION ONCE
Refills: 0 | Status: COMPLETED | OUTPATIENT
Start: 2021-04-13 | End: 2021-04-13

## 2021-04-13 RX ORDER — LOSARTAN POTASSIUM 100 MG/1
100 TABLET, FILM COATED ORAL DAILY
Refills: 0 | Status: DISCONTINUED | OUTPATIENT
Start: 2021-04-13 | End: 2021-04-30

## 2021-04-13 RX ORDER — IPRATROPIUM BROMIDE 0.2 MG/ML
1 SOLUTION, NON-ORAL INHALATION EVERY 6 HOURS
Refills: 0 | Status: DISCONTINUED | OUTPATIENT
Start: 2021-04-13 | End: 2021-05-06

## 2021-04-13 RX ORDER — ROSUVASTATIN CALCIUM 5 MG/1
1 TABLET ORAL
Qty: 0 | Refills: 0 | DISCHARGE

## 2021-04-13 RX ORDER — DEXTROSE 50 % IN WATER 50 %
12.5 SYRINGE (ML) INTRAVENOUS ONCE
Refills: 0 | Status: DISCONTINUED | OUTPATIENT
Start: 2021-04-13 | End: 2021-05-06

## 2021-04-13 RX ORDER — SODIUM CHLORIDE 9 MG/ML
1000 INJECTION, SOLUTION INTRAVENOUS ONCE
Refills: 0 | Status: COMPLETED | OUTPATIENT
Start: 2021-04-13 | End: 2021-04-13

## 2021-04-13 RX ORDER — ACETAMINOPHEN 500 MG
650 TABLET ORAL ONCE
Refills: 0 | Status: COMPLETED | OUTPATIENT
Start: 2021-04-13 | End: 2021-04-13

## 2021-04-13 RX ORDER — ACETAMINOPHEN 500 MG
650 TABLET ORAL EVERY 6 HOURS
Refills: 0 | Status: DISCONTINUED | OUTPATIENT
Start: 2021-04-13 | End: 2021-05-06

## 2021-04-13 RX ORDER — REMDESIVIR 5 MG/ML
200 INJECTION INTRAVENOUS EVERY 24 HOURS
Refills: 0 | Status: COMPLETED | OUTPATIENT
Start: 2021-04-13 | End: 2021-04-13

## 2021-04-13 RX ORDER — REMDESIVIR 5 MG/ML
INJECTION INTRAVENOUS
Refills: 0 | Status: COMPLETED | OUTPATIENT
Start: 2021-04-13 | End: 2021-04-17

## 2021-04-13 RX ORDER — DEXTROSE 50 % IN WATER 50 %
25 SYRINGE (ML) INTRAVENOUS ONCE
Refills: 0 | Status: DISCONTINUED | OUTPATIENT
Start: 2021-04-13 | End: 2021-05-06

## 2021-04-13 RX ORDER — DEXAMETHASONE 0.5 MG/5ML
6 ELIXIR ORAL ONCE
Refills: 0 | Status: COMPLETED | OUTPATIENT
Start: 2021-04-13 | End: 2021-04-13

## 2021-04-13 RX ORDER — REMDESIVIR 5 MG/ML
100 INJECTION INTRAVENOUS EVERY 24 HOURS
Refills: 0 | Status: COMPLETED | OUTPATIENT
Start: 2021-04-14 | End: 2021-04-17

## 2021-04-13 RX ORDER — TIOTROPIUM BROMIDE 18 UG/1
1 CAPSULE ORAL; RESPIRATORY (INHALATION) DAILY
Refills: 0 | Status: DISCONTINUED | OUTPATIENT
Start: 2021-04-13 | End: 2021-04-26

## 2021-04-13 RX ORDER — ENOXAPARIN SODIUM 100 MG/ML
40 INJECTION SUBCUTANEOUS DAILY
Refills: 0 | Status: DISCONTINUED | OUTPATIENT
Start: 2021-04-13 | End: 2021-05-01

## 2021-04-13 RX ORDER — ALBUTEROL 90 UG/1
1 AEROSOL, METERED ORAL EVERY 4 HOURS
Refills: 0 | Status: DISCONTINUED | OUTPATIENT
Start: 2021-04-13 | End: 2021-05-06

## 2021-04-13 RX ORDER — DEXAMETHASONE 0.5 MG/5ML
6 ELIXIR ORAL DAILY
Refills: 0 | Status: COMPLETED | OUTPATIENT
Start: 2021-04-14 | End: 2021-04-22

## 2021-04-13 RX ADMIN — Medication 50 GRAM(S): at 10:21

## 2021-04-13 RX ADMIN — REMDESIVIR 500 MILLIGRAM(S): 5 INJECTION INTRAVENOUS at 22:46

## 2021-04-13 RX ADMIN — Medication 10 MILLIGRAM(S): at 18:09

## 2021-04-13 RX ADMIN — Medication 2: at 18:51

## 2021-04-13 RX ADMIN — Medication 650 MILLIGRAM(S): at 12:12

## 2021-04-13 RX ADMIN — Medication 3 MILLILITER(S): at 16:36

## 2021-04-13 RX ADMIN — ENOXAPARIN SODIUM 40 MILLIGRAM(S): 100 INJECTION SUBCUTANEOUS at 22:46

## 2021-04-13 RX ADMIN — Medication 1 PUFF(S): at 22:22

## 2021-04-13 RX ADMIN — ALBUTEROL 2 PUFF(S): 90 AEROSOL, METERED ORAL at 22:46

## 2021-04-13 RX ADMIN — Medication 6 MILLIGRAM(S): at 09:37

## 2021-04-13 RX ADMIN — SODIUM CHLORIDE 1000 MILLILITER(S): 9 INJECTION, SOLUTION INTRAVENOUS at 09:18

## 2021-04-13 RX ADMIN — LOSARTAN POTASSIUM 100 MILLIGRAM(S): 100 TABLET, FILM COATED ORAL at 18:09

## 2021-04-13 RX ADMIN — Medication 81 MILLIGRAM(S): at 22:46

## 2021-04-13 RX ADMIN — Medication 650 MILLIGRAM(S): at 09:37

## 2021-04-13 RX ADMIN — Medication 2: at 23:02

## 2021-04-13 RX ADMIN — Medication 650 MILLIGRAM(S): at 19:10

## 2021-04-13 RX ADMIN — Medication 3 MILLILITER(S): at 10:21

## 2021-04-13 RX ADMIN — Medication 650 MILLIGRAM(S): at 18:09

## 2021-04-13 NOTE — H&P ADULT - NSHPLABSRESULTS_GEN_ALL_CORE
LABORATORY VALUES	 	                          12.1   6.54  )-----------( 254      ( 13 Apr 2021 09:44 )             36.7       04-13    137  |  102  |  12  ----------------------------<  149<H>  4.0   |  24  |  1.03    Ca    9.0      13 Apr 2021 09:44    TPro  7.8  /  Alb  3.6  /  TBili  0.4  /  DBili  x   /  AST  47<H>  /  ALT  32  /  AlkPhos  66  04-13    LIVER FUNCTIONS - ( 13 Apr 2021 09:44 )  Alb: 3.6 g/dL / Pro: 7.8 g/dL / ALK PHOS: 66 U/L / ALT: 32 U/L / AST: 47 U/L / GGT: x               CARDIAC MARKERS:  hStrop 7--> 7    Blood Gas Venous - Lactate: 1.6 mmol/L (04-13 @ 09:44)    EKG:    COVID PCR detected    CT Angio chest w/ IV contrast: no pulmonary embolus is noted.  Extensive opacities are noted throughout both lungs. They're consistent with the patient's known history of Covid pneumonia. LABORATORY VALUES	 	                          12.1   6.54  )-----------( 254      ( 13 Apr 2021 09:44 )             36.7       04-13    137  |  102  |  12  ----------------------------<  149<H>  4.0   |  24  |  1.03    Ca    9.0      13 Apr 2021 09:44    TPro  7.8  /  Alb  3.6  /  TBili  0.4  /  DBili  x   /  AST  47<H>  /  ALT  32  /  AlkPhos  66  04-13    LIVER FUNCTIONS - ( 13 Apr 2021 09:44 )  Alb: 3.6 g/dL / Pro: 7.8 g/dL / ALK PHOS: 66 U/L / ALT: 32 U/L / AST: 47 U/L / GGT: x               CARDIAC MARKERS:  hStrop 7--> 7    Blood Gas Venous - Lactate: 1.6 mmol/L (04-13 @ 09:44)    EKG: pending    COVID PCR detected    CT Angio chest w/ IV contrast: no pulmonary embolus is noted.  Extensive opacities are noted throughout both lungs. They're consistent with the patient's known history of Covid pneumonia. LABORATORY VALUES	 	                          12.1   6.54  )-----------( 254      ( 13 Apr 2021 09:44 )             36.7       04-13    137  |  102  |  12  ----------------------------<  149<H>  4.0   |  24  |  1.03    Ca    9.0      13 Apr 2021 09:44    TPro  7.8  /  Alb  3.6  /  TBili  0.4  /  DBili  x   /  AST  47<H>  /  ALT  32  /  AlkPhos  66  04-13    LIVER FUNCTIONS - ( 13 Apr 2021 09:44 )  Alb: 3.6 g/dL / Pro: 7.8 g/dL / ALK PHOS: 66 U/L / ALT: 32 U/L / AST: 47 U/L / GGT: x           CARDIAC MARKERS:  hStrop 7--> 7    Blood Gas Venous - Lactate: 1.6 mmol/L (04-13 @ 09:44)    EKG: pending    COVID PCR detected    CT Angio chest w/ IV contrast: no pulmonary embolus is noted.  Extensive opacities are noted throughout both lungs. They're consistent with the patient's known history of Covid pneumonia.

## 2021-04-13 NOTE — H&P ADULT - PROBLEM SELECTOR PLAN 4
-Continue home medications: Losartan 100mg qD  -DASH and Consistent carbohydrate diet  -Monitor blood pressure per routine

## 2021-04-13 NOTE — ED PROVIDER NOTE - NS ED ROS FT
Gen: + fevers, fatigue  CV: + chest pain  Resp: + SOB, cough  GI: Denies nausea, vomiting  Msk: + upper back pain (chronic)  : Denies dysuria  Neuro: Denies LOC

## 2021-04-13 NOTE — H&P ADULT - PROBLEM SELECTOR PLAN 2
-COVID PCR detected  - CT Angio chest w/ IV contrast-  no pulmonary embolus.  Extensive opacities are noted throughout both lungs. They're consistent with the patient's known history of Covid pneumonia.  - Inflammatory markers are  elevated ( LDH, CPK, CRP, ferritin, d-dimer) consistent w/ COVID 19.   - start on decadron 6mg IVP x 10 days  - start on remdesevir x 5 days  - supportive care with PRN cough medications, Tylenol, albuterol   - encourage incentive spirometer use  - monitor oxygen saturations closely, maintain 02 sat >93% - COVID PCR detected  - was febrile, tachycardic/tachypneic, met criteria for sepsis on admission  - CT Angio chest w/ IV contrast-  no pulmonary embolus.  Extensive opacities are noted throughout both lungs. They're consistent with the patient's known history of Covid pneumonia.  - Inflammatory markers are  elevated ( LDH, CPK, CRP, ferritin, d-dimer) consistent w/ COVID 19.   - start on decadron 6mg IVP x 10 days  - start on remdesevir x 5 days  - supportive care with PRN cough medications, Tylenol, albuterol   - encourage incentive spirometer use  - monitor oxygen saturations closely, maintain 02 sat >93%

## 2021-04-13 NOTE — ED PROVIDER NOTE - CLINICAL SUMMARY MEDICAL DECISION MAKING FREE TEXT BOX
Roselia Hamilton, PGY-1: 61YO female hx of DM, HTN, p/w 6d of worsening SOB, fatigue + covid dx 3d prior. hypoxic 90% on RA, tachypneic 130s. pt endorses pleuritic CP, fevers, denies other infectious sources. ddx PE, covid, bacterial pneumonia. plan for NC for now, consider high flow. plan for steroids, mag, supplemental O2, duoneb, covid workup, sepsis workup, CT angio, admission.

## 2021-04-13 NOTE — PATIENT PROFILE ADULT - NSPROIMPLANTSMEDDEV_GEN_A_NUR
Anesthesia Evaluation     Patient summary reviewed and Nursing notes reviewed   NPO Solid Status: > 8 hours  NPO Liquid Status: > 2 hours           Airway   Mallampati: II  TM distance: >3 FB  Neck ROM: full  Dental      Pulmonary    Cardiovascular     (+) hypertension, hyperlipidemia,       Neuro/Psych  GI/Hepatic/Renal/Endo    (+) obesity,  GERD,      Musculoskeletal     Abdominal    Substance History      OB/GYN          Other                        Anesthesia Plan    ASA 2     MAC     intravenous induction   Anesthetic plan, all risks, benefits, and alternatives have been provided, discussed and informed consent has been obtained with: patient.       None

## 2021-04-13 NOTE — ED ADULT NURSE NOTE - NSIMPLEMENTINTERV_GEN_ALL_ED
Implemented All Universal Safety Interventions:  Roll to call system. Call bell, personal items and telephone within reach. Instruct patient to call for assistance. Room bathroom lighting operational. Non-slip footwear when patient is off stretcher. Physically safe environment: no spills, clutter or unnecessary equipment. Stretcher in lowest position, wheels locked, appropriate side rails in place.

## 2021-04-13 NOTE — ED PROVIDER NOTE - PHYSICAL EXAMINATION
Gen: WDWN, NAD  HEENT: EOMI, no nasal discharge, mucous membranes moist  CV: tachycardic, +S1/S2, no M/R/G  Resp: diffuse crackles, no W/R/R  GI: Abdomen soft non-distended, NTTP, no masses  MSK: No open wounds, no bruising, no LE edema  Neuro: A&Ox4, following commands, moving all four extremities spontaneously  Psych: appropriate mood

## 2021-04-13 NOTE — ED ADULT NURSE NOTE - OBJECTIVE STATEMENT
Pt presents to ED with shortness of breath that started last night after testing positive for covid on Sunday. Pt AxOx3, ambulatory at baseline. Pt states the last time she took tylenol was 100mg at 2pm yesterday. pt unsure if she has a fever at this time. Pt denies chest pain, lightheadedness and dizziness. Pt breathing labored and tachypneic, with a resp rate in the 20s-30s. Pt placed on 3L NC with an o2 sat of 96%. Pt denies abd pain, n/v/d. Pt denies other complaints. Skin clean dry and intact. 20g IVL placed in the L AC.

## 2021-04-13 NOTE — ED ADULT NURSE NOTE - CHIEF COMPLAINT QUOTE
Pt brought in by EMS for SOB/difficulty breathing.  O2 sat RA 80%.  Given O2 4L with O2 sat increasing to 95% or greater.  Covid+ since Sunday with PNA.  C/O pain with deep inspiration.  PMH HLD HTN DM.  On losartan zocor metformin

## 2021-04-13 NOTE — H&P ADULT - PROBLEM SELECTOR PLAN 5
- Check lipid panel  - C/w pravastatin - Check lipid panel  - C/w Pravastatin - Check lipid panel  - C/w rosuvastatin

## 2021-04-13 NOTE — H&P ADULT - ASSESSMENT
60 year old female with PMhx of T2DM ( on Metformin) and HTN presents with worsening shortness of breath and fatigue. Admit to medicine for acute respiratory failure with hypoxia 2/2 COVID 19.  60 year old female with PMhx of T2DM ( on Metformin), HTN and depression presents with worsening shortness of breath and fatigue. Admit to medicine for acute respiratory failure with hypoxia 2/2 COVID 19.  60 year old female with T2DM (on Metformin), HTN and depression presents with worsening shortness of breath and fatigue, hypoxic to the 80s, admitted to medicine for acute respiratory failure with hypoxia and sepsis 2/2 COVID 19.

## 2021-04-13 NOTE — H&P ADULT - NSICDXPASTMEDICALHX_GEN_ALL_CORE_FT
PAST MEDICAL HISTORY:  Diabetes mellitus     HTN (hypertension)      PAST MEDICAL HISTORY:  Depression     Diabetes mellitus     HTN (hypertension)

## 2021-04-13 NOTE — H&P ADULT - NSHPREVIEWOFSYSTEMS_GEN_ALL_CORE
CONSTITUTIONAL: + fever; + chills; +night sweats; No weight loss; + fatigue  EYES: No eye pain; No blurry vision  ENMT:  No difficulty hearing; No sinus or throat pain  NECK: No pain or stiffness  RESPIRATORY: + dry cough; No wheezing; No hemoptysis; + shortness of breath; No sputum production  CARDIOVASCULAR: + pleuritic chest pain; No palpitations; No leg swelling  GASTROINTESTINAL: No abdominal pain; no nausea; No vomiting; No hematemesis; No diarrhea; No constipation. No melena or hematochezia.  GENITOURINARY: No dysuria; No frequency; No hematuria; No incontinence  NEUROLOGICAL: No headaches; No loss of strength; No numbness  SKIN: No itching/burning; No rashes or lesions   MUSCULOSKELETAL: No joint pain or swelling; No muscle, back, or extremity pain  HEME/LYMPH: No easy bruising, or bleeding gums CONSTITUTIONAL: + fever; + chills; no night sweats; No weight loss; + fatigue + poor po intake, + generalized body aches  EYES: No eye pain; No blurry vision  ENMT:  No difficulty hearing; No sinus or throat pain  NECK: No pain or stiffness  RESPIRATORY: + dry cough; No wheezing; No hemoptysis; + shortness of breath; No sputum production  CARDIOVASCULAR: + pleuritic chest pain; No palpitations; No leg swelling  GASTROINTESTINAL: No abdominal pain; no nausea; No vomiting; No hematemesis; No diarrhea; No constipation. No melena or hematochezia.  GENITOURINARY: No dysuria; No frequency; No hematuria; No incontinence  NEUROLOGICAL: No headaches; No loss of strength; No numbness  SKIN: No itching/burning; No rashes or lesions   MUSCULOSKELETAL: No joint pain or swelling; No muscle, back, or extremity pain  HEME/LYMPH: No easy bruising, or bleeding gums CONSTITUTIONAL: + fever; + chills; no night sweats; No weight loss; + fatigue + poor po intake, + generalized body aches  EYES: No eye pain; No blurry vision  ENMT:  No difficulty hearing; No sinus or throat pain  NECK: No pain or stiffness  RESPIRATORY: + dry cough; No wheezing; No hemoptysis; + shortness of breath; No sputum production  CARDIOVASCULAR: + pleuritic chest pain; No palpitations; No leg swelling  GASTROINTESTINAL: No abdominal pain; no nausea; No vomiting; No hematemesis; No diarrhea; No constipation. No melena or hematochezia.  GENITOURINARY: No dysuria; No frequency; No hematuria; No incontinence  NEUROLOGICAL: No headaches; No loss of strength; No numbness  SKIN: No itching/burning; No rashes or lesions   MUSCULOSKELETAL: No joint pain or swelling; No muscle, back, or extremity pain  HEME/LYMPH: No easy bruising, or bleeding gums  ALLERGY: rash? w/ keflex

## 2021-04-13 NOTE — H&P ADULT - PROBLEM SELECTOR PLAN 3
-Check HbA1c  -Hold oral hypoglycemic agents ( Metformin)  -Monitor blood sugars qAC and qHS  -Lispro low insulin sliding scale  -DASH and Consistent carbohydrate diet -Check HbA1c  -Hold home oral hypoglycemic agents ( Metformin)  -Monitor blood sugars qAC and qHS  -Lispro low insulin sliding scale  -DASH and Consistent carbohydrate diet Keystone Flap Text: The defect edges were debeveled with a #15 scalpel blade.  Given the location of the defect, shape of the defect a keystone flap was deemed most appropriate.  Using a sterile surgical marker, an appropriate keystone flap was drawn incorporating the defect, outlining the appropriate donor tissue and placing the expected incisions within the relaxed skin tension lines where possible. The area thus outlined was incised deep to adipose tissue with a #15 scalpel blade.  The skin margins were undermined to an appropriate distance in all directions around the primary defect and laterally outward around the flap utilizing iris scissors.

## 2021-04-13 NOTE — ED ADULT TRIAGE NOTE - CHIEF COMPLAINT QUOTE
Pt brought in by EMS for SOB/difficulty breathing.  O2 sat RA 80%.  Given O2 4L with O2 sat increasing to 95% or greater.  Covid+ since Sunday with PNA.  C/O pain with deep inspiration. Pt brought in by EMS for SOB/difficulty breathing.  O2 sat RA 80%.  Given O2 4L with O2 sat increasing to 95% or greater.  Covid+ since Sunday with PNA.  C/O pain with deep inspiration.  PMH HLD HTN DM.  On losartan zocor metformin

## 2021-04-13 NOTE — ED PROVIDER NOTE - ATTENDING CONTRIBUTION TO CARE
no Dr. Phillips: 59 yo female with DM and HTN, 5 days of cough and SOB, diagnosed with Covid 2 days ago, in ED with SOB worsening today.  Pt's  noted that at home pulse ox showed low 80s% and so sent pt to ED.  Pt states that at urgent care when she was diagnosed with Covid her CXR showed "pneumonia".  Pt admits to SOB and pleuritic chest pain, no fever, N/V/D or abdominal pain.  On exam pt unwell appearing, in moderate distress due to SOB (speaking short sentences), heart tachycardic, decreased breath sounds at bases, abd NTND, extremities without swelling, strength 5/5 in all extremities and skin without rash.

## 2021-04-13 NOTE — ED PROVIDER NOTE - NS ED MD EM SELECTION
Bess Stoll APRN CNP  P Erc Float Pool             RNS Please let patient know his white blood cell count is elevated slightly. Is he having any fevers or recent infections? I would like to recheck this in 1 week if he is having no signs and symptoms of fevers, chills, neck stiffness etc. I know he said he was recently ill. This could be residual from this.     ROLAND Us CNP         [id number] Critical Care - 30 to 74 minutes

## 2021-04-13 NOTE — H&P ADULT - ATTENDING COMMENTS
Patient seen and examined, d/w ACP Carmelo, agree w/ above.    59 yo F w/ HTN, HLD, DM2 (on metformin) presenting w/ SOB, noted to be hypoxic to the 80s per EMS, also febrile, tachycardic, tachypneic, in the setting of COVID infection (PCR positive, CT scan consistent w/ COVID infection). Patient believes son is her exposure, reports  is also sick. Patient admitted w/ sepsis and acute hypoxic respiratory failure secondary to COVID19. Discussed with patient plan to initiate Remdesivir and dexamethasone as well potential side effects, patient in agreement with treatment plan. C/w supplemental O2 as needed, monitor respiratory status closely. Will cover patient with sliding scale insulin, watch out for steroid induced hyperglycemia. DVT ppx w/ lovenox, patient thankful to hear CT negative for PE.

## 2021-04-13 NOTE — H&P ADULT - HISTORY OF PRESENT ILLNESS
INCOMPLETE 60 year old female with PMhx of T2DM ( on Metformin), HTN and depression presents with worsening shortness of breath and fatigue.  INCOMPLETE 60 year old female with PMhx of T2DM ( on Metformin), HTN and depression presents with worsening shortness of breath and fatigue.  Patient reports she tested COVID 19 positive 4 days ago, and now with worsening of breath associated with pleuritic chest pain and dry cough.  She reports fatigue and body aches associated with poor po intake. She episodes of fever and chills, improved/ resolves with Tylenol.  She denies any nausea, vomiting or diarrhea. Patient had recent ill exposure to her son.  Patient was BIBEMs, found hypoxic to 80s, given 4L NC on transit. In ED, patient received decadron 6mg IVP, albuterol and 1L of LR.     On exam, patient reports tachypneic and tachycardic, with 02 saturation of 98% on 5L NC.  60 year old female with PMhx of T2DM ( on Metformin), HTN and depression presents with worsening shortness of breath and fatigue.  Patient reports she tested COVID 19 positive 4 days ago, and now with worsening of breath associated with pleuritic chest pain and dry cough.  She reports fatigue and body aches associated with poor po intake. She episodes of fever and chills, improved/ resolves with Tylenol.  She denies any nausea, vomiting or diarrhea. Patient had recent ill exposure to her son.  Patient was BIBEMs, found hypoxic to 80s, given 4L NC on transit. In ED, patient received decadron 6mg IVP, albuterol and 1L of LR.     On exam, patient tachypneic RR22-24 and tachycardic 105-115 bpm, with 02 saturation of 98% on 5L NC.  60 year old female with PMhx of T2DM (on Metformin), HTN and depression presents with worsening shortness of breath and fatigue.  Patient reports she tested COVID 19 positive 4 days ago, and now with worsening of breath associated with pleuritic chest pain and dry cough.  She reports fatigue and body aches associated with poor po intake. She episodes of fever and chills, improved/ resolves with Tylenol.  She denies any nausea, vomiting or diarrhea. Patient had recent ill exposure to her son. Patient was BIBEMs, found hypoxic to 80s, given 4L NC on transit. In ED, patient received decadron 6mg IVP, albuterol and 1L of LR.     On exam, patient tachypneic RR22-24 and tachycardic 105-115 bpm, with 02 saturation of 98% on 5L NC.

## 2021-04-13 NOTE — H&P ADULT - NSHPSOCIALHISTORY_GEN_ALL_CORE
lives with family  denies ETOH, tobacco or illicit drug use lives with family  , has son  denies ETOH, tobacco or illicit drug use

## 2021-04-13 NOTE — ED PROVIDER NOTE - OBJECTIVE STATEMENT
61YO female hx of DM, HTN, p/w 6d of worsening SOB, fatigue + covid dx 3d prior. BIBA, called due to pulse ox in the low 80s, found to be 83% by EMS, improved to 95% on 4L NC. pt endorses pleuritic CP, fevers, denies vomiting, diarrhea, dysuria. denies hx of DVT/PE.

## 2021-04-13 NOTE — H&P ADULT - PROBLEM SELECTOR PLAN 1
- On admission was saturating 80% on RA, currently on 5L NC  - due to COVID-19 (management as below)  - continuous pulse oximetry, wean off supplemental oxygen as tolerated  - initial BVG lactate 1.6

## 2021-04-13 NOTE — H&P ADULT - NSHPPHYSICALEXAM_GEN_ALL_CORE
ICU Vital Signs Last 24 Hrs  T(C): 37.7 (13 Apr 2021 13:33), Max: 39.3 (13 Apr 2021 09:02)  T(F): 99.8 (13 Apr 2021 13:33), Max: 102.8 (13 Apr 2021 09:02)  HR: 114 (13 Apr 2021 13:33) (114 - 130)  BP: 133/81 (13 Apr 2021 13:33) (133/81 - 148/89)  RR: 15 (13 Apr 2021 13:33) (15 - 25)  SpO2: 97% (13 Apr 2021 13:33) (96% - 98%)      PHYSICAL EXAM:  GENERAL: NAD,   HEAD:  Atraumatic, Normocephalic  EYES: EOMI, PERRLA, conjunctiva and sclera clear  NECK: Supple, No JVD  CHEST/LUNG: Clear to auscultation bilaterally; No wheeze  HEART: Regular rate and rhythm; No murmurs, rubs, or gallops  ABDOMEN: Soft, Nontender, Nondistended; Bowel sounds present  EXTREMITIES:  2+ Peripheral Pulses, No clubbing, cyanosis, or edema  PSYCH: AAOx3  NEUROLOGY: non-focal  SKIN: No rashes or lesions ICU Vital Signs Last 24 Hrs  T(C): 37.7 (13 Apr 2021 13:33), Max: 39.3 (13 Apr 2021 09:02)  T(F): 99.8 (13 Apr 2021 13:33), Max: 102.8 (13 Apr 2021 09:02)  HR: 114 (13 Apr 2021 13:33) (114 - 130)  BP: 133/81 (13 Apr 2021 13:33) (133/81 - 148/89)  RR: 15 (13 Apr 2021 13:33) (15 - 25)  SpO2: 97% (13 Apr 2021 13:33) (96% - 98%)      PHYSICAL EXAM:  GENERAL: NAD, mild distress  HEAD:  Atraumatic, Normocephalic  EYES: EOMI, PERRLA, conjunctiva and sclera clear  NECK: Supple, No JVD  CHEST/LUNG: Clear to auscultation bilaterally; No wheeze  HEART: Regular rhythm, tachycardic; No murmurs, rubs, or gallops  ABDOMEN: Soft, Nontender, Nondistended; Bowel sounds present  EXTREMITIES:  2+ Peripheral Pulses, No clubbing, cyanosis, or edema  PSYCH: AAOx3  NEUROLOGY: non-focal, CNII- XII   SKIN: No rashes or lesions ICU Vital Signs Last 24 Hrs  T(C): 37.7 (13 Apr 2021 13:33), Max: 39.3 (13 Apr 2021 09:02)  T(F): 99.8 (13 Apr 2021 13:33), Max: 102.8 (13 Apr 2021 09:02)  HR: 114 (13 Apr 2021 13:33) (114 - 130)  BP: 133/81 (13 Apr 2021 13:33) (133/81 - 148/89)  RR: 15 (13 Apr 2021 13:33) (15 - 25)  SpO2: 97% (13 Apr 2021 13:33) (96% - 98%)      PHYSICAL EXAM:  GENERAL: NAD, mild distress  HEAD:  Atraumatic, Normocephalic  EYES: EOMI, PERRLA, conjunctiva and sclera clear  NECK: Supple, No JVD  CHEST/LUNG: tachypneic, bibasilar crackles,  No wheeze  HEART: Regular rhythm, tachycardic; No murmurs, rubs, or gallops  ABDOMEN: Soft, Nontender, Nondistended; Bowel sounds present  EXTREMITIES:  2+ Peripheral Pulses, No clubbing, cyanosis, or edema  PSYCH: AAOx3  NEUROLOGY: non-focal, CNII- XII   SKIN: No rashes or lesions Vital Signs Last 24 Hrs  T(C): 37.7 (13 Apr 2021 13:33), Max: 39.3 (13 Apr 2021 09:02)  T(F): 99.8 (13 Apr 2021 13:33), Max: 102.8 (13 Apr 2021 09:02)  HR: 114 (13 Apr 2021 13:33) (114 - 130)  BP: 133/81 (13 Apr 2021 13:33) (133/81 - 148/89)  RR: 15 (13 Apr 2021 13:33) (15 - 25)  SpO2: 97% (13 Apr 2021 13:33) (96% - 98%)      PHYSICAL EXAM:  GENERAL: NAD, mild distress  HEAD:  Atraumatic, Normocephalic  EYES: EOMI, PERRLA, conjunctiva and sclera clear  NECK: Supple, No JVD  CHEST/LUNG: tachypneic, bibasilar crackles,  No wheeze, NC in place  HEART: Regular rhythm, tachycardic; No murmurs, rubs, or gallops  ABDOMEN: Soft, Nontender, Nondistended; Bowel sounds present  EXTREMITIES:  2+ Peripheral Pulses, No clubbing, cyanosis, or edema  PSYCH: AAOx3  NEUROLOGY: non-focal, CNII- XII   SKIN: No rashes or lesions

## 2021-04-14 LAB
A1C WITH ESTIMATED AVERAGE GLUCOSE RESULT: 7.8 % — HIGH (ref 4–5.6)
ALBUMIN SERPL ELPH-MCNC: 3.4 G/DL — SIGNIFICANT CHANGE UP (ref 3.3–5)
ALP SERPL-CCNC: 66 U/L — SIGNIFICANT CHANGE UP (ref 40–120)
ALT FLD-CCNC: 59 U/L — HIGH (ref 4–33)
ANION GAP SERPL CALC-SCNC: 11 MMOL/L — SIGNIFICANT CHANGE UP (ref 7–14)
AST SERPL-CCNC: 85 U/L — HIGH (ref 4–32)
BILIRUB DIRECT SERPL-MCNC: <0.2 MG/DL — SIGNIFICANT CHANGE UP (ref 0–0.2)
BILIRUB INDIRECT FLD-MCNC: >0.1 MG/DL — SIGNIFICANT CHANGE UP (ref 0–1)
BILIRUB SERPL-MCNC: 0.3 MG/DL — SIGNIFICANT CHANGE UP (ref 0.2–1.2)
BUN SERPL-MCNC: 16 MG/DL — SIGNIFICANT CHANGE UP (ref 7–23)
CALCIUM SERPL-MCNC: 9.3 MG/DL — SIGNIFICANT CHANGE UP (ref 8.4–10.5)
CHLORIDE SERPL-SCNC: 105 MMOL/L — SIGNIFICANT CHANGE UP (ref 98–107)
CHOLEST SERPL-MCNC: 147 MG/DL — SIGNIFICANT CHANGE UP
CO2 SERPL-SCNC: 24 MMOL/L — SIGNIFICANT CHANGE UP (ref 22–31)
COVID-19 SPIKE DOMAIN AB INTERP: NEGATIVE — SIGNIFICANT CHANGE UP
COVID-19 SPIKE DOMAIN ANTIBODY RESULT: 0.4 U/ML — SIGNIFICANT CHANGE UP
CREAT SERPL-MCNC: 0.85 MG/DL — SIGNIFICANT CHANGE UP (ref 0.5–1.3)
CREAT SERPL-MCNC: 0.86 MG/DL — SIGNIFICANT CHANGE UP (ref 0.5–1.3)
ESTIMATED AVERAGE GLUCOSE: 177 MG/DL — HIGH (ref 68–114)
GLUCOSE BLDC GLUCOMTR-MCNC: 167 MG/DL — HIGH (ref 70–99)
GLUCOSE SERPL-MCNC: 167 MG/DL — HIGH (ref 70–99)
HCT VFR BLD CALC: 36.2 % — SIGNIFICANT CHANGE UP (ref 34.5–45)
HCV AB S/CO SERPL IA: 0.11 S/CO — SIGNIFICANT CHANGE UP (ref 0–0.99)
HCV AB SERPL-IMP: SIGNIFICANT CHANGE UP
HDLC SERPL-MCNC: 33 MG/DL — LOW
HGB BLD-MCNC: 11.8 G/DL — SIGNIFICANT CHANGE UP (ref 11.5–15.5)
LIPID PNL WITH DIRECT LDL SERPL: 78 MG/DL — SIGNIFICANT CHANGE UP
MAGNESIUM SERPL-MCNC: 2.7 MG/DL — HIGH (ref 1.6–2.6)
MCHC RBC-ENTMCNC: 28.1 PG — SIGNIFICANT CHANGE UP (ref 27–34)
MCHC RBC-ENTMCNC: 32.6 GM/DL — SIGNIFICANT CHANGE UP (ref 32–36)
MCV RBC AUTO: 86.2 FL — SIGNIFICANT CHANGE UP (ref 80–100)
NON HDL CHOLESTEROL: 114 MG/DL — SIGNIFICANT CHANGE UP
NRBC # BLD: 0 /100 WBCS — SIGNIFICANT CHANGE UP
NRBC # FLD: 0 K/UL — SIGNIFICANT CHANGE UP
PHOSPHATE SERPL-MCNC: 4.1 MG/DL — SIGNIFICANT CHANGE UP (ref 2.5–4.5)
PLATELET # BLD AUTO: 310 K/UL — SIGNIFICANT CHANGE UP (ref 150–400)
POTASSIUM SERPL-MCNC: 4.3 MMOL/L — SIGNIFICANT CHANGE UP (ref 3.5–5.3)
POTASSIUM SERPL-SCNC: 4.3 MMOL/L — SIGNIFICANT CHANGE UP (ref 3.5–5.3)
PROT SERPL-MCNC: 7.7 G/DL — SIGNIFICANT CHANGE UP (ref 6–8.3)
RBC # BLD: 4.2 M/UL — SIGNIFICANT CHANGE UP (ref 3.8–5.2)
RBC # FLD: 15.1 % — HIGH (ref 10.3–14.5)
SARS-COV-2 IGG+IGM SERPL QL IA: 0.4 U/ML — SIGNIFICANT CHANGE UP
SARS-COV-2 IGG+IGM SERPL QL IA: NEGATIVE — SIGNIFICANT CHANGE UP
SODIUM SERPL-SCNC: 140 MMOL/L — SIGNIFICANT CHANGE UP (ref 135–145)
TRIGL SERPL-MCNC: 179 MG/DL — HIGH
WBC # BLD: 7.03 K/UL — SIGNIFICANT CHANGE UP (ref 3.8–10.5)
WBC # FLD AUTO: 7.03 K/UL — SIGNIFICANT CHANGE UP (ref 3.8–10.5)

## 2021-04-14 PROCEDURE — 99233 SBSQ HOSP IP/OBS HIGH 50: CPT

## 2021-04-14 RX ORDER — PANTOPRAZOLE SODIUM 20 MG/1
40 TABLET, DELAYED RELEASE ORAL
Refills: 0 | Status: DISCONTINUED | OUTPATIENT
Start: 2021-04-14 | End: 2021-04-26

## 2021-04-14 RX ORDER — IBUPROFEN 200 MG
400 TABLET ORAL ONCE
Refills: 0 | Status: COMPLETED | OUTPATIENT
Start: 2021-04-14 | End: 2021-04-14

## 2021-04-14 RX ADMIN — Medication 650 MILLIGRAM(S): at 09:20

## 2021-04-14 RX ADMIN — REMDESIVIR 500 MILLIGRAM(S): 5 INJECTION INTRAVENOUS at 22:55

## 2021-04-14 RX ADMIN — ENOXAPARIN SODIUM 40 MILLIGRAM(S): 100 INJECTION SUBCUTANEOUS at 12:12

## 2021-04-14 RX ADMIN — Medication 1 PUFF(S): at 21:31

## 2021-04-14 RX ADMIN — Medication 1 PUFF(S): at 06:50

## 2021-04-14 RX ADMIN — Medication 81 MILLIGRAM(S): at 12:12

## 2021-04-14 RX ADMIN — Medication 650 MILLIGRAM(S): at 02:15

## 2021-04-14 RX ADMIN — Medication 30 MILLILITER(S): at 15:27

## 2021-04-14 RX ADMIN — Medication 1 PUFF(S): at 17:47

## 2021-04-14 RX ADMIN — Medication 1: at 09:33

## 2021-04-14 RX ADMIN — ALBUTEROL 2 PUFF(S): 90 AEROSOL, METERED ORAL at 17:46

## 2021-04-14 RX ADMIN — LOSARTAN POTASSIUM 100 MILLIGRAM(S): 100 TABLET, FILM COATED ORAL at 06:50

## 2021-04-14 RX ADMIN — Medication 650 MILLIGRAM(S): at 10:24

## 2021-04-14 RX ADMIN — Medication 6 MILLIGRAM(S): at 06:50

## 2021-04-14 RX ADMIN — PANTOPRAZOLE SODIUM 40 MILLIGRAM(S): 20 TABLET, DELAYED RELEASE ORAL at 15:28

## 2021-04-14 RX ADMIN — Medication 650 MILLIGRAM(S): at 01:15

## 2021-04-14 RX ADMIN — Medication 2: at 18:23

## 2021-04-14 RX ADMIN — Medication 400 MILLIGRAM(S): at 22:01

## 2021-04-14 RX ADMIN — Medication 650 MILLIGRAM(S): at 17:59

## 2021-04-14 RX ADMIN — ALBUTEROL 2 PUFF(S): 90 AEROSOL, METERED ORAL at 09:13

## 2021-04-14 RX ADMIN — Medication 1 PUFF(S): at 09:12

## 2021-04-14 RX ADMIN — Medication 100 MILLIGRAM(S): at 09:13

## 2021-04-14 RX ADMIN — ALBUTEROL 2 PUFF(S): 90 AEROSOL, METERED ORAL at 06:50

## 2021-04-14 RX ADMIN — Medication 10 MILLIGRAM(S): at 12:12

## 2021-04-14 RX ADMIN — Medication 2: at 13:43

## 2021-04-14 RX ADMIN — Medication 400 MILLIGRAM(S): at 21:31

## 2021-04-14 NOTE — PROGRESS NOTE ADULT - PROBLEM SELECTOR PLAN 3
-A1c 7.8  -Hold home oral hypoglycemic agents ( Metformin)  -Monitor blood sugars qAC and qHS  -Lispro low insulin sliding scale  -DASH and Consistent carbohydrate diet

## 2021-04-14 NOTE — PROGRESS NOTE ADULT - PROBLEM SELECTOR PLAN 2
- COVID PCR detected since Sunday April 11th, but likely exposed earlier from her son  - was febrile, tachycardic/tachypneic, met criteria for sepsis on admission  - CT Angio chest w/ IV contrast-  no pulmonary embolus.  Extensive opacities are noted throughout both lungs. They're consistent with the patient's known history of Covid pneumonia.  - Inflammatory markers are  elevated ( LDH, CPK, CRP, ferritin, d-dimer) consistent w/ COVID 19.   - Resume decadron 6mg IVP x 10 days  - Resume remdesevir x 5 days  - supportive care with PRN cough medications, Tylenol, albuterol   - encourage incentive spirometer use  - monitor oxygen saturations closely, maintain 02 sat >93%  - Will consider Tocilizumab if hypoxia worsens or with escalating O2 requirements

## 2021-04-14 NOTE — PROGRESS NOTE ADULT - PROBLEM SELECTOR PLAN 1
- On admission was saturating 80% on RA, currently titrated from 5L NC to 4L NC  - due to COVID-19 (management as below)  - continuous pulse oximetry, wean off supplemental oxygen as tolerated  - initial BVG lactate 1.6

## 2021-04-14 NOTE — PROGRESS NOTE ADULT - SUBJECTIVE AND OBJECTIVE BOX
Patient is a 60y old  Female who presents with a chief complaint of shortness of breath (13 Apr 2021 16:28)    SUBJECTIVE / OVERNIGHT EVENTS: Pt was seen earlier @ 11AM.   Pt tried to ambulate to bathroom but became very short of breath.  Pt complains of chest tightness and dry cough.  No N/V/D.  States that she tested positive last Sunday.      MEDICATIONS  (STANDING):  ALBUTerol    90 MICROgram(s) HFA Inhaler 1 Puff(s) Inhalation every 4 hours  ALBUTerol    90 MICROgram(s) HFA Inhaler 2 Puff(s) Inhalation every 6 hours  aspirin enteric coated 81 milliGRAM(s) Oral daily  dexAMETHasone  Injectable 6 milliGRAM(s) IV Push daily  dextrose 40% Gel 15 Gram(s) Oral once  dextrose 5%. 1000 milliLiter(s) (50 mL/Hr) IV Continuous <Continuous>  dextrose 5%. 1000 milliLiter(s) (100 mL/Hr) IV Continuous <Continuous>  dextrose 50% Injectable 25 Gram(s) IV Push once  dextrose 50% Injectable 12.5 Gram(s) IV Push once  dextrose 50% Injectable 25 Gram(s) IV Push once  enoxaparin Injectable 40 milliGRAM(s) SubCutaneous daily  glucagon  Injectable 1 milliGRAM(s) IntraMuscular once  insulin lispro (ADMELOG) corrective regimen sliding scale   SubCutaneous three times a day before meals  insulin lispro (ADMELOG) corrective regimen sliding scale   SubCutaneous at bedtime  ipratropium 17 MICROgram(s) HFA Inhaler 1 Puff(s) Inhalation every 6 hours  losartan 100 milliGRAM(s) Oral daily  pantoprazole    Tablet 40 milliGRAM(s) Oral before breakfast  PARoxetine 10 milliGRAM(s) Oral daily  remdesivir  IVPB   IV Intermittent   remdesivir  IVPB 100 milliGRAM(s) IV Intermittent every 24 hours  tiotropium 18 MICROgram(s) Capsule 1 Capsule(s) Inhalation daily    MEDICATIONS  (PRN):  acetaminophen   Tablet .. 650 milliGRAM(s) Oral every 6 hours PRN Temp greater or equal to 38C (100.4F), Mild Pain (1 - 3), Moderate Pain (4 - 6)  aluminum hydroxide/magnesium hydroxide/simethicone Suspension 30 milliLiter(s) Oral every 4 hours PRN Dyspepsia  benzonatate 100 milliGRAM(s) Oral every 8 hours PRN Cough      CAPILLARY BLOOD GLUCOSE  235 (13 Apr 2021 18:00)      POCT Blood Glucose.: 229 mg/dL (14 Apr 2021 13:37)  POCT Blood Glucose.: 167 mg/dL (14 Apr 2021 09:12)  POCT Blood Glucose.: 302 mg/dL (13 Apr 2021 22:43)  POCT Blood Glucose.: 235 mg/dL (13 Apr 2021 17:44)    I&O's Summary    13 Apr 2021 07:01  -  14 Apr 2021 07:00  --------------------------------------------------------  IN: 250 mL / OUT: 300 mL / NET: -50 mL    PHYSICAL EXAM:  Vital Signs Last 24 Hrs  T(C): 36.9 (14 Apr 2021 10:03), Max: 37.3 (13 Apr 2021 19:10)  T(F): 98.5 (14 Apr 2021 10:03), Max: 99.1 (13 Apr 2021 19:10)  HR: 92 (14 Apr 2021 10:03) (90 - 115)  BP: 140/80 (14 Apr 2021 10:03) (138/86 - 148/82)  BP(mean): --  RR: 20 (14 Apr 2021 10:03) (20 - 22)  SpO2: 94% (14 Apr 2021 10:03) (94% - 98%)    CONSTITUTIONAL: Mild distress 2/2 SOB  EYES: PERRLA; conjunctiva and sclera clear  RESPIRATORY: +tachypneic; lungs are clear to auscultation bilaterally  CARDIOVASCULAR: Regular rate and rhythm, normal S1 and S2, no murmur/rub/gallop; No lower extremity edema  ABDOMEN: Nontender to palpation, normoactive bowel sounds, no rebound/guarding; No hepatosplenomegaly  MUSCLOSKELETAL:  Normal gait; no clubbing or cyanosis of digits; no joint swelling or tenderness to palpation  PSYCH: A+O to person, place, and time; affect appropriate  NEUROLOGY: CN 2-12 are intact and symmetric; no gross sensory deficits;   SKIN: No rashes; no palpable lesions    LABS:                        11.8   7.03  )-----------( 310      ( 14 Apr 2021 07:36 )             36.2     04-14    140  |  105  |  16  ----------------------------<  167<H>  4.3   |  24  |  0.86    Ca    9.3      14 Apr 2021 07:36  Phos  4.1     04-14  Mg     2.7     04-14    TPro  7.7  /  Alb  3.4  /  TBili  0.3  /  DBili  <0.2  /  AST  85<H>  /  ALT  59<H>  /  AlkPhos  66  04-14      Culture - Blood (collected 13 Apr 2021 16:47)  Source: .Blood Blood-Peripheral  Preliminary Report (14 Apr 2021 17:01):    No growth to date.    Culture - Blood (collected 13 Apr 2021 16:47)  Source: .Blood Blood-Peripheral  Preliminary Report (14 Apr 2021 17:01):    No growth to date.      Trend Procalcitonin  04-13-21 @ 09:44   -  0.54<H>      C-Reactive Protein, Serum: 185.5 mg/L (04-13-21 @ 09:44)    Ferritin, Serum: 738 ng/mL (04-13-21 @ 09:44)    D-Dimer:  349 ng/mL DDU (04-13-21 @ 09:44)    RADIOLOGY & ADDITIONAL TESTS:  Results Reviewed:   < from: CT Angio Chest w/ IV Cont (04.13.21 @ 12:53) >  IMPRESSION: No pulmonary embolus is noted.    Extensive opacities are noted throughout both lungs. They're consistent with the patient's known history of Covid pneumonia.

## 2021-04-15 LAB
ALBUMIN SERPL ELPH-MCNC: 3.5 G/DL — SIGNIFICANT CHANGE UP (ref 3.3–5)
ALP SERPL-CCNC: 64 U/L — SIGNIFICANT CHANGE UP (ref 40–120)
ALT FLD-CCNC: 57 U/L — HIGH (ref 4–33)
ANION GAP SERPL CALC-SCNC: 12 MMOL/L — SIGNIFICANT CHANGE UP (ref 7–14)
AST SERPL-CCNC: 51 U/L — HIGH (ref 4–32)
BILIRUB DIRECT SERPL-MCNC: <0.2 MG/DL — SIGNIFICANT CHANGE UP (ref 0–0.2)
BILIRUB INDIRECT FLD-MCNC: >0.2 MG/DL — SIGNIFICANT CHANGE UP (ref 0–1)
BILIRUB SERPL-MCNC: 0.4 MG/DL — SIGNIFICANT CHANGE UP (ref 0.2–1.2)
BUN SERPL-MCNC: 23 MG/DL — SIGNIFICANT CHANGE UP (ref 7–23)
CALCIUM SERPL-MCNC: 9.3 MG/DL — SIGNIFICANT CHANGE UP (ref 8.4–10.5)
CHLORIDE SERPL-SCNC: 104 MMOL/L — SIGNIFICANT CHANGE UP (ref 98–107)
CO2 SERPL-SCNC: 25 MMOL/L — SIGNIFICANT CHANGE UP (ref 22–31)
CREAT SERPL-MCNC: 1.02 MG/DL — SIGNIFICANT CHANGE UP (ref 0.5–1.3)
CREAT SERPL-MCNC: 1.02 MG/DL — SIGNIFICANT CHANGE UP (ref 0.5–1.3)
GLUCOSE SERPL-MCNC: 193 MG/DL — HIGH (ref 70–99)
HCT VFR BLD CALC: 37.3 % — SIGNIFICANT CHANGE UP (ref 34.5–45)
HGB BLD-MCNC: 11.9 G/DL — SIGNIFICANT CHANGE UP (ref 11.5–15.5)
MAGNESIUM SERPL-MCNC: 2.7 MG/DL — HIGH (ref 1.6–2.6)
MCHC RBC-ENTMCNC: 27.6 PG — SIGNIFICANT CHANGE UP (ref 27–34)
MCHC RBC-ENTMCNC: 31.9 GM/DL — LOW (ref 32–36)
MCV RBC AUTO: 86.5 FL — SIGNIFICANT CHANGE UP (ref 80–100)
NRBC # BLD: 0 /100 WBCS — SIGNIFICANT CHANGE UP
NRBC # FLD: 0 K/UL — SIGNIFICANT CHANGE UP
PHOSPHATE SERPL-MCNC: 4.2 MG/DL — SIGNIFICANT CHANGE UP (ref 2.5–4.5)
PLATELET # BLD AUTO: 388 K/UL — SIGNIFICANT CHANGE UP (ref 150–400)
POTASSIUM SERPL-MCNC: 4.8 MMOL/L — SIGNIFICANT CHANGE UP (ref 3.5–5.3)
POTASSIUM SERPL-SCNC: 4.8 MMOL/L — SIGNIFICANT CHANGE UP (ref 3.5–5.3)
PROT SERPL-MCNC: 7.3 G/DL — SIGNIFICANT CHANGE UP (ref 6–8.3)
RBC # BLD: 4.31 M/UL — SIGNIFICANT CHANGE UP (ref 3.8–5.2)
RBC # FLD: 15 % — HIGH (ref 10.3–14.5)
SODIUM SERPL-SCNC: 141 MMOL/L — SIGNIFICANT CHANGE UP (ref 135–145)
WBC # BLD: 11.19 K/UL — HIGH (ref 3.8–10.5)
WBC # FLD AUTO: 11.19 K/UL — HIGH (ref 3.8–10.5)

## 2021-04-15 PROCEDURE — 99233 SBSQ HOSP IP/OBS HIGH 50: CPT

## 2021-04-15 RX ORDER — INSULIN LISPRO 100/ML
3 VIAL (ML) SUBCUTANEOUS
Refills: 0 | Status: DISCONTINUED | OUTPATIENT
Start: 2021-04-15 | End: 2021-04-16

## 2021-04-15 RX ORDER — INSULIN GLARGINE 100 [IU]/ML
10 INJECTION, SOLUTION SUBCUTANEOUS AT BEDTIME
Refills: 0 | Status: DISCONTINUED | OUTPATIENT
Start: 2021-04-15 | End: 2021-04-16

## 2021-04-15 RX ORDER — POLYETHYLENE GLYCOL 3350 17 G/17G
17 POWDER, FOR SOLUTION ORAL DAILY
Refills: 0 | Status: DISCONTINUED | OUTPATIENT
Start: 2021-04-15 | End: 2021-05-06

## 2021-04-15 RX ADMIN — POLYETHYLENE GLYCOL 3350 17 GRAM(S): 17 POWDER, FOR SOLUTION ORAL at 13:46

## 2021-04-15 RX ADMIN — ALBUTEROL 2 PUFF(S): 90 AEROSOL, METERED ORAL at 12:09

## 2021-04-15 RX ADMIN — REMDESIVIR 500 MILLIGRAM(S): 5 INJECTION INTRAVENOUS at 21:49

## 2021-04-15 RX ADMIN — Medication 6 MILLIGRAM(S): at 06:22

## 2021-04-15 RX ADMIN — Medication 0.5 MILLIGRAM(S): at 12:08

## 2021-04-15 RX ADMIN — Medication 1: at 21:48

## 2021-04-15 RX ADMIN — Medication 2: at 13:45

## 2021-04-15 RX ADMIN — LOSARTAN POTASSIUM 100 MILLIGRAM(S): 100 TABLET, FILM COATED ORAL at 06:21

## 2021-04-15 RX ADMIN — PANTOPRAZOLE SODIUM 40 MILLIGRAM(S): 20 TABLET, DELAYED RELEASE ORAL at 06:21

## 2021-04-15 RX ADMIN — INSULIN GLARGINE 10 UNIT(S): 100 INJECTION, SOLUTION SUBCUTANEOUS at 21:47

## 2021-04-15 RX ADMIN — Medication 10 MILLIGRAM(S): at 12:09

## 2021-04-15 RX ADMIN — Medication 81 MILLIGRAM(S): at 12:09

## 2021-04-15 RX ADMIN — Medication 1 PUFF(S): at 18:19

## 2021-04-15 RX ADMIN — Medication 1 PUFF(S): at 22:33

## 2021-04-15 RX ADMIN — ALBUTEROL 2 PUFF(S): 90 AEROSOL, METERED ORAL at 18:19

## 2021-04-15 RX ADMIN — Medication 2: at 09:48

## 2021-04-15 RX ADMIN — ALBUTEROL 2 PUFF(S): 90 AEROSOL, METERED ORAL at 22:33

## 2021-04-15 RX ADMIN — ENOXAPARIN SODIUM 40 MILLIGRAM(S): 100 INJECTION SUBCUTANEOUS at 12:09

## 2021-04-15 RX ADMIN — Medication 1 PUFF(S): at 12:10

## 2021-04-15 RX ADMIN — Medication 3: at 18:20

## 2021-04-15 RX ADMIN — Medication 3 UNIT(S): at 18:20

## 2021-04-15 NOTE — PROGRESS NOTE ADULT - PROBLEM SELECTOR PLAN 3
-A1c 7.8  -Hold home oral hypoglycemic agents ( Metformin)  -Monitor blood sugars qAC and qHS  -Lispro low insulin sliding scale  -DASH and Consistent carbohydrate diet  -Now likely with steroid induced hyperglycemia, added basal/bolus regimen

## 2021-04-15 NOTE — PROGRESS NOTE ADULT - SUBJECTIVE AND OBJECTIVE BOX
Patient is a 60y old  Female who presents with a chief complaint of shortness of breath (14 Apr 2021 17:18)    SUBJECTIVE / OVERNIGHT EVENTS: Pt extremely anxious, states that she has palpitations and is afraid of the virus course.  Still very SOB, however we were able to wean down to 3L NC from 4L NC with O2 sat maintaining in 93-96% range.  States that even using the bedpan makes her very SOB.     MEDICATIONS  (STANDING):  ALBUTerol    90 MICROgram(s) HFA Inhaler 1 Puff(s) Inhalation every 4 hours  ALBUTerol    90 MICROgram(s) HFA Inhaler 2 Puff(s) Inhalation every 6 hours  aspirin enteric coated 81 milliGRAM(s) Oral daily  dexAMETHasone  Injectable 6 milliGRAM(s) IV Push daily  dextrose 40% Gel 15 Gram(s) Oral once  dextrose 5%. 1000 milliLiter(s) (50 mL/Hr) IV Continuous <Continuous>  dextrose 5%. 1000 milliLiter(s) (100 mL/Hr) IV Continuous <Continuous>  dextrose 50% Injectable 25 Gram(s) IV Push once  dextrose 50% Injectable 12.5 Gram(s) IV Push once  dextrose 50% Injectable 25 Gram(s) IV Push once  enoxaparin Injectable 40 milliGRAM(s) SubCutaneous daily  glucagon  Injectable 1 milliGRAM(s) IntraMuscular once  insulin glargine Injectable (LANTUS) 10 Unit(s) SubCutaneous at bedtime  insulin lispro (ADMELOG) corrective regimen sliding scale   SubCutaneous three times a day before meals  insulin lispro (ADMELOG) corrective regimen sliding scale   SubCutaneous at bedtime  insulin lispro Injectable (ADMELOG) 3 Unit(s) SubCutaneous three times a day before meals  ipratropium 17 MICROgram(s) HFA Inhaler 1 Puff(s) Inhalation every 6 hours  losartan 100 milliGRAM(s) Oral daily  pantoprazole    Tablet 40 milliGRAM(s) Oral before breakfast  PARoxetine 10 milliGRAM(s) Oral daily  polyethylene glycol 3350 17 Gram(s) Oral daily  remdesivir  IVPB   IV Intermittent   remdesivir  IVPB 100 milliGRAM(s) IV Intermittent every 24 hours  tiotropium 18 MICROgram(s) Capsule 1 Capsule(s) Inhalation daily    MEDICATIONS  (PRN):  acetaminophen   Tablet .. 650 milliGRAM(s) Oral every 6 hours PRN Temp greater or equal to 38C (100.4F), Mild Pain (1 - 3), Moderate Pain (4 - 6)  aluminum hydroxide/magnesium hydroxide/simethicone Suspension 30 milliLiter(s) Oral every 4 hours PRN Dyspepsia  benzonatate 100 milliGRAM(s) Oral every 8 hours PRN Cough  LORazepam   Injectable 0.5 milliGRAM(s) IV Push every 6 hours PRN Anxiety      CAPILLARY BLOOD GLUCOSE      POCT Blood Glucose.: 245 mg/dL (15 Apr 2021 13:09)  POCT Blood Glucose.: 222 mg/dL (15 Apr 2021 09:15)  POCT Blood Glucose.: 183 mg/dL (14 Apr 2021 22:51)  POCT Blood Glucose.: 214 mg/dL (14 Apr 2021 18:19)    I&O's Summary    14 Apr 2021 07:01  -  15 Apr 2021 07:00  --------------------------------------------------------  IN: 0 mL / OUT: 250 mL / NET: -250 mL        PHYSICAL EXAM:  Vital Signs Last 24 Hrs  T(C): 36.9 (15 Apr 2021 10:51), Max: 37.1 (14 Apr 2021 17:38)  T(F): 98.4 (15 Apr 2021 10:51), Max: 98.8 (14 Apr 2021 17:38)  HR: 103 (15 Apr 2021 10:51) (90 - 103)  BP: 138/79 (15 Apr 2021 10:51) (138/79 - 144/82)  BP(mean): --  RR: 20 (15 Apr 2021 10:51) (18 - 20)  SpO2: 94% (15 Apr 2021 10:51) (90% - 94%)    CONSTITUTIONAL: NAD, well-developed, well-groomed  EYES: PERRLA; conjunctiva and sclera clear  RESPIRATORY: Normal respiratory effort; lungs are clear to auscultation bilaterally  CARDIOVASCULAR: Regular rate and rhythm, normal S1 and S2, no murmur/rub/gallop; No lower extremity edema  ABDOMEN: Nontender to palpation, normoactive bowel sounds, no rebound/guarding  MUSCLOSKELETAL:  No clubbing or cyanosis of digits; no joint swelling or tenderness to palpation  PSYCH: A+O to person, place, and time; affect appropriate, anxious   NEUROLOGY: CN 2-12 are intact and symmetric; no gross sensory deficits;   SKIN: No rashes; no palpable lesions    LABS:                        11.9   11.19 )-----------( 388      ( 15 Apr 2021 07:16 )             37.3     04-15    141  |  104  |  23  ----------------------------<  193<H>  4.8   |  25  |  1.02    Ca    9.3      15 Apr 2021 07:16  Phos  4.2     04-15  Mg     2.7     04-15    TPro  7.3  /  Alb  3.5  /  TBili  0.4  /  DBili  <0.2  /  AST  51<H>  /  ALT  57<H>  /  AlkPhos  64  04-15      Culture - Blood (collected 13 Apr 2021 16:47)  Source: .Blood Blood-Peripheral  Preliminary Report (14 Apr 2021 17:01):    No growth to date.    Culture - Blood (collected 13 Apr 2021 16:47)  Source: .Blood Blood-Peripheral  Preliminary Report (14 Apr 2021 17:01):    No growth to date.      Trend Procalcitonin  04-13-21 @ 09:44   -  0.54<H>    C-Reactive Protein, Serum: 185.5 mg/L (04-13-21 @ 09:44)    Ferritin, Serum: 738 ng/mL (04-13-21 @ 09:44)    D-Dimer:  349 ng/mL DDU (04-13-21 @ 09:44)      RADIOLOGY & ADDITIONAL TESTS:  Results Reviewed:   < from: CT Angio Chest w/ IV Cont (04.13.21 @ 12:53) >  No hilar and/or mediastinal adenopathy is noted.    Heart is mildly enlarged in size. No pericardial effusion is noted. Pulmonary arteries are normal in caliber. No filling defects are noted.    No endobronchial lesions are noted. Extensive opacities are noted throughout both lungs, more so in the peripheral aspect and involving both lower lobes. No pleural effusions are noted.    Below the diaphragm, visualized portions of the abdomen are unremarkable.    Visualized osseous structures are within normal limits.    IMPRESSION: No pulmonary embolus is noted.    Extensive opacities are noted throughout both lungs. They're consistent with the patient's known history of Covid pneumonia.

## 2021-04-15 NOTE — PROGRESS NOTE ADULT - PROBLEM SELECTOR PLAN 2
- COVID PCR detected since Sunday April 11th, but likely exposed earlier from her son  - was febrile, tachycardic/tachypneic, met criteria for sepsis on admission  - CT Angio chest w/ IV contrast-  no pulmonary embolus.  Extensive opacities are noted throughout both lungs. They're consistent with the patient's known history of Covid pneumonia.  - Inflammatory markers are  elevated ( LDH, CPK, CRP, ferritin, d-dimer) consistent w/ COVID 19. Will recheck inflammatory markers for AM for trend.  - Resume decadron 6mg IVP x 10 days  - Resume remdesivir x 5 days  - supportive care with PRN cough medications, Tylenol, albuterol   - encourage incentive spirometer use  - monitor oxygen saturations closely, maintain 02 sat >93%  - Will consider Tocilizumab if hypoxia worsens or with escalating O2 requirements

## 2021-04-15 NOTE — PROGRESS NOTE ADULT - PROBLEM SELECTOR PLAN 1
- On admission was saturating 80% on RA, currently titrated from 5L NC to 4L NC, now down to 3L NC 4/15  - due to COVID-19 (management as below)  - continuous pulse oximetry, wean off supplemental oxygen as tolerated  - initial BVG lactate 1.6

## 2021-04-16 LAB
ALBUMIN SERPL ELPH-MCNC: 3.3 G/DL — SIGNIFICANT CHANGE UP (ref 3.3–5)
ALBUMIN SERPL ELPH-MCNC: 3.4 G/DL — SIGNIFICANT CHANGE UP (ref 3.3–5)
ALP SERPL-CCNC: 67 U/L — SIGNIFICANT CHANGE UP (ref 40–120)
ALP SERPL-CCNC: 68 U/L — SIGNIFICANT CHANGE UP (ref 40–120)
ALT FLD-CCNC: 41 U/L — HIGH (ref 4–33)
ALT FLD-CCNC: 41 U/L — HIGH (ref 4–33)
ANION GAP SERPL CALC-SCNC: 13 MMOL/L — SIGNIFICANT CHANGE UP (ref 7–14)
AST SERPL-CCNC: 28 U/L — SIGNIFICANT CHANGE UP (ref 4–32)
AST SERPL-CCNC: 29 U/L — SIGNIFICANT CHANGE UP (ref 4–32)
BASOPHILS # BLD AUTO: 0.03 K/UL — SIGNIFICANT CHANGE UP (ref 0–0.2)
BASOPHILS NFR BLD AUTO: 0.3 % — SIGNIFICANT CHANGE UP (ref 0–2)
BILIRUB DIRECT SERPL-MCNC: <0.2 MG/DL — SIGNIFICANT CHANGE UP (ref 0–0.2)
BILIRUB INDIRECT FLD-MCNC: >0.1 MG/DL — SIGNIFICANT CHANGE UP (ref 0–1)
BILIRUB SERPL-MCNC: 0.3 MG/DL — SIGNIFICANT CHANGE UP (ref 0.2–1.2)
BILIRUB SERPL-MCNC: 0.3 MG/DL — SIGNIFICANT CHANGE UP (ref 0.2–1.2)
BUN SERPL-MCNC: 35 MG/DL — HIGH (ref 7–23)
CALCIUM SERPL-MCNC: 8.9 MG/DL — SIGNIFICANT CHANGE UP (ref 8.4–10.5)
CHLORIDE SERPL-SCNC: 107 MMOL/L — SIGNIFICANT CHANGE UP (ref 98–107)
CO2 SERPL-SCNC: 23 MMOL/L — SIGNIFICANT CHANGE UP (ref 22–31)
CREAT SERPL-MCNC: 0.99 MG/DL — SIGNIFICANT CHANGE UP (ref 0.5–1.3)
CREAT SERPL-MCNC: 1 MG/DL — SIGNIFICANT CHANGE UP (ref 0.5–1.3)
CRP SERPL-MCNC: 53.2 MG/L — HIGH
D DIMER BLD IA.RAPID-MCNC: 510 NG/ML DDU — HIGH
EOSINOPHIL # BLD AUTO: 0 K/UL — SIGNIFICANT CHANGE UP (ref 0–0.5)
EOSINOPHIL NFR BLD AUTO: 0 % — SIGNIFICANT CHANGE UP (ref 0–6)
FERRITIN SERPL-MCNC: 909 NG/ML — HIGH (ref 15–150)
GLUCOSE BLDC GLUCOMTR-MCNC: 263 MG/DL — HIGH (ref 70–99)
GLUCOSE BLDC GLUCOMTR-MCNC: 277 MG/DL — HIGH (ref 70–99)
GLUCOSE BLDC GLUCOMTR-MCNC: 303 MG/DL — HIGH (ref 70–99)
GLUCOSE SERPL-MCNC: 282 MG/DL — HIGH (ref 70–99)
HCT VFR BLD CALC: 37 % — SIGNIFICANT CHANGE UP (ref 34.5–45)
HGB BLD-MCNC: 11.9 G/DL — SIGNIFICANT CHANGE UP (ref 11.5–15.5)
IANC: 8.44 K/UL — SIGNIFICANT CHANGE UP (ref 1.5–8.5)
IMM GRANULOCYTES NFR BLD AUTO: 1.5 % — SIGNIFICANT CHANGE UP (ref 0–1.5)
LDH SERPL L TO P-CCNC: 423 U/L — HIGH (ref 135–225)
LYMPHOCYTES # BLD AUTO: 0.92 K/UL — LOW (ref 1–3.3)
LYMPHOCYTES # BLD AUTO: 8.8 % — LOW (ref 13–44)
MAGNESIUM SERPL-MCNC: 3 MG/DL — HIGH (ref 1.6–2.6)
MCHC RBC-ENTMCNC: 27.6 PG — SIGNIFICANT CHANGE UP (ref 27–34)
MCHC RBC-ENTMCNC: 32.2 GM/DL — SIGNIFICANT CHANGE UP (ref 32–36)
MCV RBC AUTO: 85.8 FL — SIGNIFICANT CHANGE UP (ref 80–100)
MONOCYTES # BLD AUTO: 0.93 K/UL — HIGH (ref 0–0.9)
MONOCYTES NFR BLD AUTO: 8.9 % — SIGNIFICANT CHANGE UP (ref 2–14)
NEUTROPHILS # BLD AUTO: 8.44 K/UL — HIGH (ref 1.8–7.4)
NEUTROPHILS NFR BLD AUTO: 80.5 % — HIGH (ref 43–77)
NRBC # BLD: 0 /100 WBCS — SIGNIFICANT CHANGE UP
NRBC # FLD: 0 K/UL — SIGNIFICANT CHANGE UP
PHOSPHATE SERPL-MCNC: 3.8 MG/DL — SIGNIFICANT CHANGE UP (ref 2.5–4.5)
PLATELET # BLD AUTO: 476 K/UL — HIGH (ref 150–400)
POTASSIUM SERPL-MCNC: 4.7 MMOL/L — SIGNIFICANT CHANGE UP (ref 3.5–5.3)
POTASSIUM SERPL-SCNC: 4.7 MMOL/L — SIGNIFICANT CHANGE UP (ref 3.5–5.3)
PROCALCITONIN SERPL-MCNC: 0.2 NG/ML — HIGH (ref 0.02–0.1)
PROT SERPL-MCNC: 7.3 G/DL — SIGNIFICANT CHANGE UP (ref 6–8.3)
PROT SERPL-MCNC: 7.3 G/DL — SIGNIFICANT CHANGE UP (ref 6–8.3)
RBC # BLD: 4.31 M/UL — SIGNIFICANT CHANGE UP (ref 3.8–5.2)
RBC # FLD: 14.8 % — HIGH (ref 10.3–14.5)
SODIUM SERPL-SCNC: 143 MMOL/L — SIGNIFICANT CHANGE UP (ref 135–145)
WBC # BLD: 10.48 K/UL — SIGNIFICANT CHANGE UP (ref 3.8–10.5)
WBC # FLD AUTO: 10.48 K/UL — SIGNIFICANT CHANGE UP (ref 3.8–10.5)

## 2021-04-16 PROCEDURE — 99233 SBSQ HOSP IP/OBS HIGH 50: CPT

## 2021-04-16 RX ORDER — INSULIN GLARGINE 100 [IU]/ML
15 INJECTION, SOLUTION SUBCUTANEOUS AT BEDTIME
Refills: 0 | Status: DISCONTINUED | OUTPATIENT
Start: 2021-04-16 | End: 2021-04-19

## 2021-04-16 RX ORDER — INSULIN LISPRO 100/ML
5 VIAL (ML) SUBCUTANEOUS
Refills: 0 | Status: DISCONTINUED | OUTPATIENT
Start: 2021-04-16 | End: 2021-04-19

## 2021-04-16 RX ORDER — SENNA PLUS 8.6 MG/1
2 TABLET ORAL AT BEDTIME
Refills: 0 | Status: DISCONTINUED | OUTPATIENT
Start: 2021-04-16 | End: 2021-05-06

## 2021-04-16 RX ADMIN — ALBUTEROL 2 PUFF(S): 90 AEROSOL, METERED ORAL at 18:01

## 2021-04-16 RX ADMIN — ALBUTEROL 2 PUFF(S): 90 AEROSOL, METERED ORAL at 23:06

## 2021-04-16 RX ADMIN — ENOXAPARIN SODIUM 40 MILLIGRAM(S): 100 INJECTION SUBCUTANEOUS at 13:59

## 2021-04-16 RX ADMIN — Medication 4: at 13:57

## 2021-04-16 RX ADMIN — ALBUTEROL 2 PUFF(S): 90 AEROSOL, METERED ORAL at 09:27

## 2021-04-16 RX ADMIN — Medication 1 PUFF(S): at 09:27

## 2021-04-16 RX ADMIN — Medication 1: at 22:32

## 2021-04-16 RX ADMIN — Medication 3: at 18:18

## 2021-04-16 RX ADMIN — Medication 81 MILLIGRAM(S): at 13:58

## 2021-04-16 RX ADMIN — Medication 5 UNIT(S): at 18:19

## 2021-04-16 RX ADMIN — INSULIN GLARGINE 15 UNIT(S): 100 INJECTION, SOLUTION SUBCUTANEOUS at 22:31

## 2021-04-16 RX ADMIN — Medication 3 UNIT(S): at 13:58

## 2021-04-16 RX ADMIN — Medication 10 MILLIGRAM(S): at 13:59

## 2021-04-16 RX ADMIN — Medication 1 PUFF(S): at 18:01

## 2021-04-16 RX ADMIN — Medication 6 MILLIGRAM(S): at 05:46

## 2021-04-16 RX ADMIN — SENNA PLUS 2 TABLET(S): 8.6 TABLET ORAL at 23:40

## 2021-04-16 RX ADMIN — Medication 3 UNIT(S): at 09:26

## 2021-04-16 RX ADMIN — LOSARTAN POTASSIUM 100 MILLIGRAM(S): 100 TABLET, FILM COATED ORAL at 05:47

## 2021-04-16 RX ADMIN — PANTOPRAZOLE SODIUM 40 MILLIGRAM(S): 20 TABLET, DELAYED RELEASE ORAL at 05:50

## 2021-04-16 RX ADMIN — Medication 1 PUFF(S): at 23:06

## 2021-04-16 RX ADMIN — Medication 3: at 09:26

## 2021-04-16 RX ADMIN — Medication 1 PUFF(S): at 04:45

## 2021-04-16 RX ADMIN — ALBUTEROL 2 PUFF(S): 90 AEROSOL, METERED ORAL at 04:47

## 2021-04-16 RX ADMIN — POLYETHYLENE GLYCOL 3350 17 GRAM(S): 17 POWDER, FOR SOLUTION ORAL at 13:59

## 2021-04-16 NOTE — PROGRESS NOTE ADULT - PROBLEM SELECTOR PLAN 2
- COVID PCR detected since Sunday April 11th, but likely exposed earlier from her son  - was febrile, tachycardic/tachypneic, met criteria for sepsis on admission  - CT Angio chest w/ IV contrast-  no pulmonary embolus.  Extensive opacities are noted throughout both lungs. They're consistent with the patient's known history of Covid pneumonia.  - Inflammatory markers are  elevated ( LDH, CPK, CRP, ferritin, d-dimer) consistent w/ COVID 19. Will recheck inflammatory markers for AM for trend.  - C/w decadron and remdesivir   - supportive care with PRN cough medications, Tylenol, albuterol   - encourage incentive spirometer use  - monitor oxygen saturations closely, maintain 02 sat >93%  - Will consider Tocilizumab if hypoxia worsens or with escalating O2 requirements

## 2021-04-16 NOTE — PROGRESS NOTE ADULT - PROBLEM SELECTOR PLAN 1
- On admission was saturating 80% on RA, currently titrated from 5L NC to 4L NC, now down to 3L NC 4/16  - due to COVID-19 (management as below)  - continuous pulse oximetry, wean off supplemental oxygen as tolerated

## 2021-04-16 NOTE — PROGRESS NOTE ADULT - SUBJECTIVE AND OBJECTIVE BOX
Hospitalist Progress Note  Authored by: Mitzy Peters MD pager # [phone]    OVERNIGHT EVENTS: MORTEZA    SUBJECTIVE / INTERVAL HPI: Patient seen and examined at bedside. Reporting that her legs feel crampy and asking if she can stretch them. She says that she is breathing comfortably on the NC. Denies fevers/chills. No BM since admission. Urinating normally. All other ROS negative.     VITAL SIGNS:  Vital Signs Last 24 Hrs  T(C): 36.8 (16 Apr 2021 14:05), Max: 36.8 (15 Apr 2021 21:40)  T(F): 98.3 (16 Apr 2021 14:05), Max: 98.3 (16 Apr 2021 14:05)  HR: 107 (16 Apr 2021 14:05) (98 - 107)  BP: 145/88 (16 Apr 2021 14:05) (135/82 - 145/88)  BP(mean): --  RR: 20 (16 Apr 2021 14:05) (19 - 20)  SpO2: 95% (16 Apr 2021 14:05) (94% - 95%)    PHYSICAL EXAM:    General: WDWN female resting in bed   HEENT: NC/AT; PERRL, anicteric sclera; MMM  Neck: supple  Cardiovascular: +S1/S2; RRR  Respiratory: CTA B/L; no W/R/R  Gastrointestinal: soft, NT/ND; +BSx4  Extremities: WWP; no edema, clubbing or cyanosis  Vascular: 2+ radial, DP/PT pulses B/L  Neurological: AAOx3; no focal deficits    MEDICATIONS:  MEDICATIONS  (STANDING):  ALBUTerol    90 MICROgram(s) HFA Inhaler 1 Puff(s) Inhalation every 4 hours  ALBUTerol    90 MICROgram(s) HFA Inhaler 2 Puff(s) Inhalation every 6 hours  aspirin enteric coated 81 milliGRAM(s) Oral daily  dexAMETHasone  Injectable 6 milliGRAM(s) IV Push daily  dextrose 40% Gel 15 Gram(s) Oral once  dextrose 5%. 1000 milliLiter(s) (50 mL/Hr) IV Continuous <Continuous>  dextrose 5%. 1000 milliLiter(s) (100 mL/Hr) IV Continuous <Continuous>  dextrose 50% Injectable 25 Gram(s) IV Push once  dextrose 50% Injectable 12.5 Gram(s) IV Push once  dextrose 50% Injectable 25 Gram(s) IV Push once  enoxaparin Injectable 40 milliGRAM(s) SubCutaneous daily  glucagon  Injectable 1 milliGRAM(s) IntraMuscular once  insulin glargine Injectable (LANTUS) 10 Unit(s) SubCutaneous at bedtime  insulin lispro (ADMELOG) corrective regimen sliding scale   SubCutaneous three times a day before meals  insulin lispro (ADMELOG) corrective regimen sliding scale   SubCutaneous at bedtime  insulin lispro Injectable (ADMELOG) 3 Unit(s) SubCutaneous three times a day before meals  ipratropium 17 MICROgram(s) HFA Inhaler 1 Puff(s) Inhalation every 6 hours  losartan 100 milliGRAM(s) Oral daily  pantoprazole    Tablet 40 milliGRAM(s) Oral before breakfast  PARoxetine 10 milliGRAM(s) Oral daily  polyethylene glycol 3350 17 Gram(s) Oral daily  remdesivir  IVPB   IV Intermittent   remdesivir  IVPB 100 milliGRAM(s) IV Intermittent every 24 hours  tiotropium 18 MICROgram(s) Capsule 1 Capsule(s) Inhalation daily    MEDICATIONS  (PRN):  acetaminophen   Tablet .. 650 milliGRAM(s) Oral every 6 hours PRN Temp greater or equal to 38C (100.4F), Mild Pain (1 - 3), Moderate Pain (4 - 6)  aluminum hydroxide/magnesium hydroxide/simethicone Suspension 30 milliLiter(s) Oral every 4 hours PRN Dyspepsia  benzonatate 100 milliGRAM(s) Oral every 8 hours PRN Cough  LORazepam   Injectable 0.5 milliGRAM(s) IV Push every 6 hours PRN Anxiety      ALLERGIES:  Allergies    erythromycin topical (Unknown)  Keflex (Unknown)    Intolerances        LABS:                        11.9   10.48 )-----------( 476      ( 16 Apr 2021 06:54 )             37.0     04-16    143  |  107  |  35<H>  ----------------------------<  282<H>  4.7   |  23  |  1.00    Ca    8.9      16 Apr 2021 06:54  Phos  3.8     04-16  Mg     3.0     04-16    TPro  7.3  /  Alb  3.4  /  TBili  0.3  /  DBili  <0.2  /  AST  29  /  ALT  41<H>  /  AlkPhos  68  04-16        CAPILLARY BLOOD GLUCOSE      POCT Blood Glucose.: 303 mg/dL (16 Apr 2021 13:15)      RADIOLOGY & ADDITIONAL TESTS: Reviewed.    ASSESSMENT:    PLAN:

## 2021-04-16 NOTE — PROGRESS NOTE ADULT - PROBLEM SELECTOR PLAN 3
-A1c 7.8  -Hold home oral hypoglycemic agents ( Metformin)  -Monitor blood sugars qAC and qHS  -Lispro low insulin sliding scale  -DASH and Consistent carbohydrate diet  -Now likely with steroid induced hyperglycemia, added basal/bolus regimen - increased from Lantus 10--> 15U and admelog 5TID

## 2021-04-17 LAB
ALBUMIN SERPL ELPH-MCNC: 3.7 G/DL — SIGNIFICANT CHANGE UP (ref 3.3–5)
ALP SERPL-CCNC: 79 U/L — SIGNIFICANT CHANGE UP (ref 40–120)
ALT FLD-CCNC: 55 U/L — HIGH (ref 4–33)
ANION GAP SERPL CALC-SCNC: 12 MMOL/L — SIGNIFICANT CHANGE UP (ref 7–14)
ANISOCYTOSIS BLD QL: SLIGHT — SIGNIFICANT CHANGE UP
AST SERPL-CCNC: 42 U/L — HIGH (ref 4–32)
BASOPHILS # BLD AUTO: 0 K/UL — SIGNIFICANT CHANGE UP (ref 0–0.2)
BASOPHILS NFR BLD AUTO: 0 % — SIGNIFICANT CHANGE UP (ref 0–2)
BILIRUB SERPL-MCNC: 0.4 MG/DL — SIGNIFICANT CHANGE UP (ref 0.2–1.2)
BUN SERPL-MCNC: 37 MG/DL — HIGH (ref 7–23)
BURR CELLS BLD QL SMEAR: PRESENT — SIGNIFICANT CHANGE UP
CALCIUM SERPL-MCNC: 9.4 MG/DL — SIGNIFICANT CHANGE UP (ref 8.4–10.5)
CHLORIDE SERPL-SCNC: 107 MMOL/L — SIGNIFICANT CHANGE UP (ref 98–107)
CO2 SERPL-SCNC: 25 MMOL/L — SIGNIFICANT CHANGE UP (ref 22–31)
CREAT SERPL-MCNC: 1.09 MG/DL — SIGNIFICANT CHANGE UP (ref 0.5–1.3)
EOSINOPHIL # BLD AUTO: 0 K/UL — SIGNIFICANT CHANGE UP (ref 0–0.5)
EOSINOPHIL NFR BLD AUTO: 0 % — SIGNIFICANT CHANGE UP (ref 0–6)
GIANT PLATELETS BLD QL SMEAR: PRESENT — SIGNIFICANT CHANGE UP
GLUCOSE BLDC GLUCOMTR-MCNC: 260 MG/DL — HIGH (ref 70–99)
GLUCOSE BLDC GLUCOMTR-MCNC: 293 MG/DL — HIGH (ref 70–99)
GLUCOSE BLDC GLUCOMTR-MCNC: 297 MG/DL — HIGH (ref 70–99)
GLUCOSE BLDC GLUCOMTR-MCNC: 310 MG/DL — HIGH (ref 70–99)
GLUCOSE SERPL-MCNC: 309 MG/DL — HIGH (ref 70–99)
HCT VFR BLD CALC: 42.3 % — SIGNIFICANT CHANGE UP (ref 34.5–45)
HGB BLD-MCNC: 13.2 G/DL — SIGNIFICANT CHANGE UP (ref 11.5–15.5)
IANC: 9.97 K/UL — HIGH (ref 1.5–8.5)
LYMPHOCYTES # BLD AUTO: 1.07 K/UL — SIGNIFICANT CHANGE UP (ref 1–3.3)
LYMPHOCYTES # BLD AUTO: 8.7 % — LOW (ref 13–44)
MACROCYTES BLD QL: SLIGHT — SIGNIFICANT CHANGE UP
MAGNESIUM SERPL-MCNC: 3.1 MG/DL — HIGH (ref 1.6–2.6)
MANUAL SMEAR VERIFICATION: SIGNIFICANT CHANGE UP
MCHC RBC-ENTMCNC: 27.4 PG — SIGNIFICANT CHANGE UP (ref 27–34)
MCHC RBC-ENTMCNC: 31.2 GM/DL — LOW (ref 32–36)
MCV RBC AUTO: 87.8 FL — SIGNIFICANT CHANGE UP (ref 80–100)
MONOCYTES # BLD AUTO: 0 K/UL — SIGNIFICANT CHANGE UP (ref 0–0.9)
MONOCYTES NFR BLD AUTO: 0 % — LOW (ref 2–14)
MYELOCYTES NFR BLD: 1.7 % — HIGH (ref 0–0)
NEUTROPHILS # BLD AUTO: 10.7 K/UL — HIGH (ref 1.8–7.4)
NEUTROPHILS NFR BLD AUTO: 87 % — HIGH (ref 43–77)
PHOSPHATE SERPL-MCNC: 3.5 MG/DL — SIGNIFICANT CHANGE UP (ref 2.5–4.5)
PLAT MORPH BLD: NORMAL — SIGNIFICANT CHANGE UP
PLATELET # BLD AUTO: 621 K/UL — HIGH (ref 150–400)
PLATELET COUNT - ESTIMATE: ABNORMAL
POTASSIUM SERPL-MCNC: 4 MMOL/L — SIGNIFICANT CHANGE UP (ref 3.5–5.3)
POTASSIUM SERPL-SCNC: 4 MMOL/L — SIGNIFICANT CHANGE UP (ref 3.5–5.3)
PROT SERPL-MCNC: 8 G/DL — SIGNIFICANT CHANGE UP (ref 6–8.3)
RBC # BLD: 4.82 M/UL — SIGNIFICANT CHANGE UP (ref 3.8–5.2)
RBC # FLD: 15.4 % — HIGH (ref 10.3–14.5)
RBC BLD AUTO: NORMAL — SIGNIFICANT CHANGE UP
SODIUM SERPL-SCNC: 144 MMOL/L — SIGNIFICANT CHANGE UP (ref 135–145)
VARIANT LYMPHS # BLD: 2.6 % — SIGNIFICANT CHANGE UP (ref 0–6)
WBC # BLD: 12.3 K/UL — HIGH (ref 3.8–10.5)
WBC # FLD AUTO: 12.3 K/UL — HIGH (ref 3.8–10.5)

## 2021-04-17 PROCEDURE — 99233 SBSQ HOSP IP/OBS HIGH 50: CPT

## 2021-04-17 RX ADMIN — Medication 1 PUFF(S): at 18:25

## 2021-04-17 RX ADMIN — Medication 5 UNIT(S): at 13:20

## 2021-04-17 RX ADMIN — ALBUTEROL 2 PUFF(S): 90 AEROSOL, METERED ORAL at 06:29

## 2021-04-17 RX ADMIN — LOSARTAN POTASSIUM 100 MILLIGRAM(S): 100 TABLET, FILM COATED ORAL at 06:28

## 2021-04-17 RX ADMIN — Medication 3: at 13:20

## 2021-04-17 RX ADMIN — PANTOPRAZOLE SODIUM 40 MILLIGRAM(S): 20 TABLET, DELAYED RELEASE ORAL at 06:28

## 2021-04-17 RX ADMIN — REMDESIVIR 500 MILLIGRAM(S): 5 INJECTION INTRAVENOUS at 01:00

## 2021-04-17 RX ADMIN — INSULIN GLARGINE 15 UNIT(S): 100 INJECTION, SOLUTION SUBCUTANEOUS at 22:05

## 2021-04-17 RX ADMIN — Medication 1 PUFF(S): at 21:00

## 2021-04-17 RX ADMIN — Medication 1: at 22:05

## 2021-04-17 RX ADMIN — Medication 4: at 09:28

## 2021-04-17 RX ADMIN — ALBUTEROL 2 PUFF(S): 90 AEROSOL, METERED ORAL at 18:25

## 2021-04-17 RX ADMIN — Medication 1 PUFF(S): at 09:31

## 2021-04-17 RX ADMIN — Medication 5 UNIT(S): at 09:28

## 2021-04-17 RX ADMIN — ENOXAPARIN SODIUM 40 MILLIGRAM(S): 100 INJECTION SUBCUTANEOUS at 13:13

## 2021-04-17 RX ADMIN — Medication 650 MILLIGRAM(S): at 22:21

## 2021-04-17 RX ADMIN — Medication 10 MILLIGRAM(S): at 13:13

## 2021-04-17 RX ADMIN — Medication 81 MILLIGRAM(S): at 13:13

## 2021-04-17 RX ADMIN — POLYETHYLENE GLYCOL 3350 17 GRAM(S): 17 POWDER, FOR SOLUTION ORAL at 13:13

## 2021-04-17 RX ADMIN — Medication 3: at 18:24

## 2021-04-17 RX ADMIN — ALBUTEROL 2 PUFF(S): 90 AEROSOL, METERED ORAL at 21:00

## 2021-04-17 RX ADMIN — REMDESIVIR 500 MILLIGRAM(S): 5 INJECTION INTRAVENOUS at 22:06

## 2021-04-17 RX ADMIN — Medication 6 MILLIGRAM(S): at 06:29

## 2021-04-17 RX ADMIN — ALBUTEROL 2 PUFF(S): 90 AEROSOL, METERED ORAL at 09:34

## 2021-04-17 RX ADMIN — Medication 1 PUFF(S): at 06:29

## 2021-04-17 RX ADMIN — Medication 5 UNIT(S): at 18:24

## 2021-04-17 NOTE — PROGRESS NOTE ADULT - PROBLEM SELECTOR PLAN 2
- CT Angio chest w/ IV contrast-  no pulmonary embolus.  Extensive opacities are noted throughout both lungs. They're consistent with the patient's known history of Covid pneumonia.  - Inflammatory markers are  elevated ( LDH, CPK, CRP, ferritin, d-dimer) consistent w/ COVID 19. Will recheck inflammatory markers for AM for trend.  - C/w decadron and remdesivir   - supportive care with PRN cough medications, Tylenol, albuterol   - encourage incentive spirometer use  - monitor oxygen saturations closely, maintain 02 sat >93%  - Will consider Tocilizumab if hypoxia worsens or with escalating O2 requirements

## 2021-04-17 NOTE — PROGRESS NOTE ADULT - SUBJECTIVE AND OBJECTIVE BOX
SUBJECTIVE / INTERVAL HPI: MORTEZA over night    VITAL SIGNS:  Vital Signs Last 24 Hrs  T(C): 36.8 (16 Apr 2021 14:05), Max: 36.8 (15 Apr 2021 21:40)  T(F): 98.3 (16 Apr 2021 14:05), Max: 98.3 (16 Apr 2021 14:05)  HR: 107 (16 Apr 2021 14:05) (98 - 107)  BP: 145/88 (16 Apr 2021 14:05) (135/82 - 145/88)  BP(mean): --  RR: 20 (16 Apr 2021 14:05) (19 - 20)  SpO2: 95% (16 Apr 2021 14:05) (94% - 95%)    PHYSICAL EXAM:    General: WDWN female resting in bed   HEENT: NC/AT; PERRL, anicteric sclera; MMM  Neck: supple  Cardiovascular: +S1/S2; RRR  Respiratory: CTA B/L; no W/R/R  Gastrointestinal: soft, NT/ND; +BSx4  Extremities: WWP; no edema, clubbing or cyanosis  Vascular: 2+ radial, DP/PT pulses B/L  Neurological: AAOx3; no focal deficits    MEDICATIONS:  MEDICATIONS  (STANDING):  ALBUTerol    90 MICROgram(s) HFA Inhaler 1 Puff(s) Inhalation every 4 hours  ALBUTerol    90 MICROgram(s) HFA Inhaler 2 Puff(s) Inhalation every 6 hours  aspirin enteric coated 81 milliGRAM(s) Oral daily  dexAMETHasone  Injectable 6 milliGRAM(s) IV Push daily  dextrose 40% Gel 15 Gram(s) Oral once  dextrose 5%. 1000 milliLiter(s) (50 mL/Hr) IV Continuous <Continuous>  dextrose 5%. 1000 milliLiter(s) (100 mL/Hr) IV Continuous <Continuous>  dextrose 50% Injectable 25 Gram(s) IV Push once  dextrose 50% Injectable 12.5 Gram(s) IV Push once  dextrose 50% Injectable 25 Gram(s) IV Push once  enoxaparin Injectable 40 milliGRAM(s) SubCutaneous daily  glucagon  Injectable 1 milliGRAM(s) IntraMuscular once  insulin glargine Injectable (LANTUS) 10 Unit(s) SubCutaneous at bedtime  insulin lispro (ADMELOG) corrective regimen sliding scale   SubCutaneous three times a day before meals  insulin lispro (ADMELOG) corrective regimen sliding scale   SubCutaneous at bedtime  insulin lispro Injectable (ADMELOG) 3 Unit(s) SubCutaneous three times a day before meals  ipratropium 17 MICROgram(s) HFA Inhaler 1 Puff(s) Inhalation every 6 hours  losartan 100 milliGRAM(s) Oral daily  pantoprazole    Tablet 40 milliGRAM(s) Oral before breakfast  PARoxetine 10 milliGRAM(s) Oral daily  polyethylene glycol 3350 17 Gram(s) Oral daily  remdesivir  IVPB   IV Intermittent   remdesivir  IVPB 100 milliGRAM(s) IV Intermittent every 24 hours  tiotropium 18 MICROgram(s) Capsule 1 Capsule(s) Inhalation daily    MEDICATIONS  (PRN):  acetaminophen   Tablet .. 650 milliGRAM(s) Oral every 6 hours PRN Temp greater or equal to 38C (100.4F), Mild Pain (1 - 3), Moderate Pain (4 - 6)  aluminum hydroxide/magnesium hydroxide/simethicone Suspension 30 milliLiter(s) Oral every 4 hours PRN Dyspepsia  benzonatate 100 milliGRAM(s) Oral every 8 hours PRN Cough  LORazepam   Injectable 0.5 milliGRAM(s) IV Push every 6 hours PRN Anxiety      ALLERGIES:  Allergies    erythromycin topical (Unknown)  Keflex (Unknown)    Intolerances        LABS:                        11.9   10.48 )-----------( 476      ( 16 Apr 2021 06:54 )             37.0     04-16    143  |  107  |  35<H>  ----------------------------<  282<H>  4.7   |  23  |  1.00    Ca    8.9      16 Apr 2021 06:54  Phos  3.8     04-16  Mg     3.0     04-16    TPro  7.3  /  Alb  3.4  /  TBili  0.3  /  DBili  <0.2  /  AST  29  /  ALT  41<H>  /  AlkPhos  68  04-16        CAPILLARY BLOOD GLUCOSE      POCT Blood Glucose.: 303 mg/dL (16 Apr 2021 13:15)      RADIOLOGY & ADDITIONAL TESTS: Reviewed.    ASSESSMENT:    PLAN:

## 2021-04-17 NOTE — PROGRESS NOTE ADULT - PROBLEM SELECTOR PLAN 1
- Acute hypoxemic respiratory failure 2/2 COVID 19 PNA now on 3L NC, improving  - continuous pulse oximetry, wean off supplemental oxygen as tolerated

## 2021-04-18 ENCOUNTER — TRANSCRIPTION ENCOUNTER (OUTPATIENT)
Age: 61
End: 2021-04-18

## 2021-04-18 LAB
CULTURE RESULTS: SIGNIFICANT CHANGE UP
CULTURE RESULTS: SIGNIFICANT CHANGE UP
GLUCOSE BLDC GLUCOMTR-MCNC: 214 MG/DL — HIGH (ref 70–99)
GLUCOSE BLDC GLUCOMTR-MCNC: 242 MG/DL — HIGH (ref 70–99)
GLUCOSE BLDC GLUCOMTR-MCNC: 249 MG/DL — HIGH (ref 70–99)
GLUCOSE BLDC GLUCOMTR-MCNC: 289 MG/DL — HIGH (ref 70–99)
SPECIMEN SOURCE: SIGNIFICANT CHANGE UP
SPECIMEN SOURCE: SIGNIFICANT CHANGE UP

## 2021-04-18 PROCEDURE — 99233 SBSQ HOSP IP/OBS HIGH 50: CPT

## 2021-04-18 RX ORDER — INSULIN LISPRO 100/ML
VIAL (ML) SUBCUTANEOUS AT BEDTIME
Refills: 0 | Status: DISCONTINUED | OUTPATIENT
Start: 2021-04-18 | End: 2021-05-06

## 2021-04-18 RX ORDER — INSULIN LISPRO 100/ML
VIAL (ML) SUBCUTANEOUS
Refills: 0 | Status: DISCONTINUED | OUTPATIENT
Start: 2021-04-18 | End: 2021-05-06

## 2021-04-18 RX ADMIN — Medication 6 MILLIGRAM(S): at 05:09

## 2021-04-18 RX ADMIN — LOSARTAN POTASSIUM 100 MILLIGRAM(S): 100 TABLET, FILM COATED ORAL at 05:09

## 2021-04-18 RX ADMIN — Medication 5 UNIT(S): at 08:05

## 2021-04-18 RX ADMIN — Medication 5 UNIT(S): at 13:44

## 2021-04-18 RX ADMIN — PANTOPRAZOLE SODIUM 40 MILLIGRAM(S): 20 TABLET, DELAYED RELEASE ORAL at 06:08

## 2021-04-18 RX ADMIN — Medication 1 PUFF(S): at 22:25

## 2021-04-18 RX ADMIN — SENNA PLUS 2 TABLET(S): 8.6 TABLET ORAL at 22:02

## 2021-04-18 RX ADMIN — Medication 1 PUFF(S): at 09:28

## 2021-04-18 RX ADMIN — Medication 10 MILLIGRAM(S): at 12:15

## 2021-04-18 RX ADMIN — Medication 2: at 09:05

## 2021-04-18 RX ADMIN — Medication 1 PUFF(S): at 03:03

## 2021-04-18 RX ADMIN — ALBUTEROL 2 PUFF(S): 90 AEROSOL, METERED ORAL at 03:03

## 2021-04-18 RX ADMIN — ENOXAPARIN SODIUM 40 MILLIGRAM(S): 100 INJECTION SUBCUTANEOUS at 12:15

## 2021-04-18 RX ADMIN — ALBUTEROL 2 PUFF(S): 90 AEROSOL, METERED ORAL at 09:27

## 2021-04-18 RX ADMIN — Medication 3: at 13:43

## 2021-04-18 RX ADMIN — INSULIN GLARGINE 15 UNIT(S): 100 INJECTION, SOLUTION SUBCUTANEOUS at 22:01

## 2021-04-18 RX ADMIN — ALBUTEROL 2 PUFF(S): 90 AEROSOL, METERED ORAL at 16:22

## 2021-04-18 RX ADMIN — Medication 4: at 18:06

## 2021-04-18 RX ADMIN — Medication 81 MILLIGRAM(S): at 12:15

## 2021-04-18 RX ADMIN — ALBUTEROL 2 PUFF(S): 90 AEROSOL, METERED ORAL at 22:24

## 2021-04-18 RX ADMIN — Medication 5 UNIT(S): at 18:06

## 2021-04-18 RX ADMIN — Medication 1 PUFF(S): at 16:22

## 2021-04-18 NOTE — DISCHARGE NOTE PROVIDER - NSDCMRMEDTOKEN_GEN_ALL_CORE_FT
Aspir 81 oral delayed release tablet: 1 tab(s) orally once a day  losartan 100 mg oral tablet: 1 tab(s) orally once a day  metFORMIN 750 mg oral tablet, extended release: 1 tab(s) orally once a day  PARoxetine 10 mg oral tablet: 1 tab(s) orally once a day  rosuvastatin 5 mg oral tablet: 1 tab(s) orally once a day   Aspir 81 oral delayed release tablet: 1 tab(s) orally once a day  losartan 100 mg oral tablet: 1 tab(s) orally once a day  metFORMIN 750 mg oral tablet, extended release: 1 tab(s) orally once a day  PARoxetine 10 mg oral tablet: 1 tab(s) orally once a day  rosuvastatin 5 mg oral tablet: 1 tab(s) orally once a day  Xarelto 10 mg oral tablet: 1 tab(s) orally once a day   price check [id number]   acetaminophen 325 mg oral tablet: 2 tab(s) orally every 6 hours, As needed, Temp greater or equal to 38C (100.4F), Mild Pain (1 - 3), Moderate Pain (4 - 6)  Aspir 81 oral delayed release tablet: 1 tab(s) orally once a day  losartan 100 mg oral tablet: 1 tab(s) orally once a day  metFORMIN 750 mg oral tablet, extended release: 1 tab(s) orally once a day  PARoxetine 10 mg oral tablet: 1 tab(s) orally once a day  rosuvastatin 5 mg oral tablet: 1 tab(s) orally once a day  sodium chloride 0.65% nasal spray: 1 spray(s) nasal once a day  Xarelto 10 mg oral tablet: 1 tab(s) orally once a day   price check [id number]   Albuterol (Eqv-Proventil HFA) 90 mcg/inh inhalation aerosol: 2 puff(s) inhaled every 6 hours, As Needed   Lopressor 50 mg oral tablet: 1 tab(s) orally 2 times a day  MetFORMIN (Eqv-Fortamet) 1000 mg oral tablet, extended release: 1 tab(s) orally 2 times a day   PARoxetine 10 mg oral tablet: 1 tab(s) orally once a day  rivaroxaban 10 mg oral tablet: 1 tab(s) orally once a day   rosuvastatin 5 mg oral tablet: 1 tab(s) orally once a day  sodium chloride 0.65% nasal spray: 1 spray(s) nasal once a day   Albuterol (Eqv-Proventil HFA) 90 mcg/inh inhalation aerosol: 2 puff(s) inhaled every 6 hours, As Needed   Lopressor 50 mg oral tablet: 1 tab(s) orally 2 times a day  metFORMIN 1000 mg oral tablet: 1 tab(s) orally 2 times a day   PARoxetine 10 mg oral tablet: 1 tab(s) orally once a day  rivaroxaban 10 mg oral tablet: 1 tab(s) orally once a day   rosuvastatin 5 mg oral tablet: 1 tab(s) orally once a day  sodium chloride 0.65% nasal spray: 1 spray(s) nasal once a day

## 2021-04-18 NOTE — DISCHARGE NOTE PROVIDER - CARE PROVIDER_API CALL
YOUR PRIMARY, CARE PROVIDER  Phone: (   )    -  Fax: (   )    -  Follow Up Time: 1 week   Marge Alex)  Critical Care Medicine; Internal Medicine; Pulmonary Disease  [address], [address]  [place] [address]  Phone: [phone]  Fax: [phone]  Follow Up Time:

## 2021-04-18 NOTE — DISCHARGE NOTE PROVIDER - NSDCHHCONTACT_GEN_ALL_CORE_FT
As certified below, I, or a nurse practitioner or physician assistant working with me, had a face-to-face encounter that meets the physician face-to-face encounter requirements.
negative...

## 2021-04-18 NOTE — DISCHARGE NOTE PROVIDER - PROVIDER TOKENS
FREE:[LAST:[YOUR PRIMARY],FIRST:[CARE PROVIDER],PHONE:[(   )    -],FAX:[(   )    -],FOLLOWUP:[1 week]] PROVIDER:[TOKEN:[[id number]]]

## 2021-04-18 NOTE — PROGRESS NOTE ADULT - SUBJECTIVE AND OBJECTIVE BOX
Patient is a 60y old  Female who presents with a chief complaint of shortness of breath (17 Apr 2021 08:16)      SUBJECTIVE / OVERNIGHT EVENTS: MORTEZA over night.     MEDICATIONS  (STANDING):  ALBUTerol    90 MICROgram(s) HFA Inhaler 1 Puff(s) Inhalation every 4 hours  ALBUTerol    90 MICROgram(s) HFA Inhaler 2 Puff(s) Inhalation every 6 hours  aspirin enteric coated 81 milliGRAM(s) Oral daily  dexAMETHasone  Injectable 6 milliGRAM(s) IV Push daily  dextrose 40% Gel 15 Gram(s) Oral once  dextrose 5%. 1000 milliLiter(s) (50 mL/Hr) IV Continuous <Continuous>  dextrose 5%. 1000 milliLiter(s) (100 mL/Hr) IV Continuous <Continuous>  dextrose 50% Injectable 25 Gram(s) IV Push once  dextrose 50% Injectable 12.5 Gram(s) IV Push once  dextrose 50% Injectable 25 Gram(s) IV Push once  enoxaparin Injectable 40 milliGRAM(s) SubCutaneous daily  glucagon  Injectable 1 milliGRAM(s) IntraMuscular once  insulin glargine Injectable (LANTUS) 15 Unit(s) SubCutaneous at bedtime  insulin lispro (ADMELOG) corrective regimen sliding scale   SubCutaneous three times a day before meals  insulin lispro (ADMELOG) corrective regimen sliding scale   SubCutaneous at bedtime  insulin lispro Injectable (ADMELOG) 5 Unit(s) SubCutaneous three times a day before meals  ipratropium 17 MICROgram(s) HFA Inhaler 1 Puff(s) Inhalation every 6 hours  losartan 100 milliGRAM(s) Oral daily  pantoprazole    Tablet 40 milliGRAM(s) Oral before breakfast  PARoxetine 10 milliGRAM(s) Oral daily  polyethylene glycol 3350 17 Gram(s) Oral daily  senna 2 Tablet(s) Oral at bedtime  tiotropium 18 MICROgram(s) Capsule 1 Capsule(s) Inhalation daily    MEDICATIONS  (PRN):  acetaminophen   Tablet .. 650 milliGRAM(s) Oral every 6 hours PRN Temp greater or equal to 38C (100.4F), Mild Pain (1 - 3), Moderate Pain (4 - 6)  aluminum hydroxide/magnesium hydroxide/simethicone Suspension 30 milliLiter(s) Oral every 4 hours PRN Dyspepsia  benzonatate 100 milliGRAM(s) Oral every 8 hours PRN Cough  LORazepam   Injectable 0.5 milliGRAM(s) IV Push every 6 hours PRN Anxiety      CAPILLARY BLOOD GLUCOSE      POCT Blood Glucose.: 249 mg/dL (18 Apr 2021 08:06)  POCT Blood Glucose.: 297 mg/dL (17 Apr 2021 21:21)  POCT Blood Glucose.: 260 mg/dL (17 Apr 2021 17:54)  POCT Blood Glucose.: 293 mg/dL (17 Apr 2021 13:13)  POCT Blood Glucose.: 310 mg/dL (17 Apr 2021 09:26)    I&O's Summary    17 Apr 2021 07:01  -  18 Apr 2021 07:00  --------------------------------------------------------  IN: 250 mL / OUT: 600 mL / NET: -350 mL        T(C): 37.1 (04-18-21 @ 05:00), Max: 37.1 (04-17-21 @ 18:22)  HR: 90 (04-18-21 @ 05:00) (90 - 100)  BP: 156/80 (04-18-21 @ 05:00) (140/82 - 156/80)  RR: 18 (04-18-21 @ 05:00) (18 - 22)  SpO2: 94% (04-18-21 @ 05:00) (79% - 100%)    General: well appearing female resting in bed   HEENT: NC/AT; PERRL, anicteric sclera; MMM  Neck: supple  Cardiovascular: +S1/S2; RRR  Respiratory: CTA B/L; no W/R/R  Gastrointestinal: soft, NT/ND; +BSx4  Extremities: WWP; no edema, clubbing or cyanosis  Vascular: 2+ radial, DP/PT pulses B/L  Neurological: AAOx3; no focal deficits  LABS:                        13.2   12.30 )-----------( 621      ( 17 Apr 2021 06:42 )             42.3     WBC Trend: 12.30<--, 10.48<--, 11.19<--  04-17    144  |  107  |  37<H>  ----------------------------<  309<H>  4.0   |  25  |  1.09    Ca    9.4      17 Apr 2021 06:42  Phos  3.5     04-17  Mg     3.1     04-17    TPro  8.0  /  Alb  3.7  /  TBili  0.4  /  DBili  x   /  AST  42<H>  /  ALT  55<H>  /  AlkPhos  79  04-17    Creatinine Trend: 1.09<--, 1.00<--, 1.02<--, 0.86<--, 0.96<--, 1.03<--              RADIOLOGY & ADDITIONAL TESTS:    Imaging Personally Reviewed:    Consultant(s) Notes Reviewed:      Care Discussed with Consultants/Other Providers:

## 2021-04-18 NOTE — DISCHARGE NOTE PROVIDER - NSDCFUSCHEDAPPT_GEN_ALL_CORE_FT
BISI AGUILAR ; 04/19/2021 ; [facility] [address]  BISI AGUILAR ; 04/19/2021 ; [facility] [address]

## 2021-04-18 NOTE — DISCHARGE NOTE PROVIDER - NSDCCPCAREPLAN_GEN_ALL_CORE_FT
PRINCIPAL DISCHARGE DIAGNOSIS  Diagnosis: COVID-19  Assessment and Plan of Treatment: You have been diagnosed with the COVID-19 virus during your hospital stay. You received antibiotics initially as you had a fever, however your blood cultures were negative. You must self quarantine to complete a 14 day time period.  Monitor for fevers, shortness of breath and cough primarily.  Monitor your temperature daily to not any changes and increases.    It has been determined that you no longer need hospitalization and can recover while remaining in self-quarantine at home. You should follow the prevention steps below until a healthcare provider or local or state health department says you can return to your normal activities.  1. You should restrict activities outside your home, except for getting medical care.  2. Do not go to work, school, or public areas.  3. Avoid using public transportation, ride-sharing, or taxis.  4. Separate yourself from other people and animals in your home.  5. Call ahead before visiting your doctor.  6. Wear a facemask.  7. Cover your coughs and sneezes.  8. Clean your hands often.  9. Avoid sharing personal household items.  10. Clean all “high-touch” surfaces everyday.  11. Monitor your symptoms.  If you have a medical emergency and need to call 911, notify the dispatch personnel that you have COVID-19 If possible, put on a facemask before emergency medical services arrive.  12. Stopping home isolation.  Patients with confirmed COVID-19 should remain under home isolation precautions for 14 days since the positive COVID-19 test and until the risk of secondary transmission to others is thought to be low. The decision to discontinue home isolation precautions should be made on a case-by-case basis, in consultation with healthcare providers and state and local health departments. Your [facility] Department of Health can be reached at [phone] for further information about COVID-19.      SECONDARY DISCHARGE DIAGNOSES  Diagnosis: Type 2 diabetes mellitus without complication, unspecified whether long term insulin use  Assessment and Plan of Treatment: Continue your medication regimen (Metformin 1000mg twice daily by mouth) and a consistent carbohydrate diet (Meaning eating the same amount of carbohydrates at the same time each day). Monitor blood glucose levels throughout the day before meals and at bedtime. Record blood sugars and bring to outpatient providers appointment in order to be reviewed by your doctor for management modifications. If your sugars are more than 400 or less than 70 you should contact your PCP immediately. Monitor for signs/symptoms of low blood glucose, such as, dizziness, altered mental status, or cool/clammy skin. In addition, monitor for signs/symptoms of high blood glucose, such as, feeling hot, dry, fatigued, or with increased thirst/urination. Make regular podiatry appointments in order to have feet checked for wounds and uncontrolled toe nail growth to prevent infections, as well as, appointments with an ophthalmologist to monitor your vision.  If you do not have an endocrinologist, please call   Endocrine Clinic  [address], [address]  [place] [address]  [phone]    Diagnosis: Hypertension, unspecified type  Assessment and Plan of Treatment: Continue blood pressure medication regimen as prescribed. Monitor for any visual changes, headachesz dizziness, chest pain or shortness of breath and return to ER if any such symptoms arise. Monitor blood pressure regularly. Please follow up with your primary care provider in 2 weeks    Diagnosis: Depression, unspecified depression type  Assessment and Plan of Treatment: Continue your medications as directed and follow up with your primary care provider/psychiatrist for further evaluation/management. If you are ever in need of immediate psychiatric assistance you may reach out to the [facility] [phone].   [facility]  [address] [place] [address] [phone]    Diagnosis: DVT prophylaxis  Assessment and Plan of Treatment: During your admission you were noted to be high risk for developing blood clots. You had a CT scan that did not show blood clot in the lungs. Please continue your blood thinners as directed for 30 days. Monitor for signs/symptoms of bleeding such as easy bruising, shortness of breath, blood in stool or vomit. If bleeding occurs please notify your primary care provider immediately or return to hospital ED.  **Follow up with your primary care provider within 1 month for further care and monitoring.     PRINCIPAL DISCHARGE DIAGNOSIS  Diagnosis: COVID-19  Assessment and Plan of Treatment: You have been diagnosed with the COVID-19 virus during your hospital stay. You received antibiotics initially as you had a fever, however your blood cultures were negative. You must self quarantine to complete a 14 day time period.  Monitor for fevers, shortness of breath and cough primarily.  Monitor your temperature daily to not any changes and increases.    It has been determined that you no longer need hospitalization and can recover while remaining in self-quarantine at home. You should follow the prevention steps below until a healthcare provider or local or state health department says you can return to your normal activities.  1. You should restrict activities outside your home, except for getting medical care.  2. Do not go to work, school, or public areas.  3. Avoid using public transportation, ride-sharing, or taxis.  4. Separate yourself from other people and animals in your home.  5. Call ahead before visiting your doctor.  6. Wear a facemask.  7. Cover your coughs and sneezes.  8. Clean your hands often.  9. Avoid sharing personal household items.  10. Clean all “high-touch” surfaces everyday.  11. Monitor your symptoms.  If you have a medical emergency and need to call 911, notify the dispatch personnel that you have COVID-19 If possible, put on a facemask before emergency medical services arrive.  12. Stopping home isolation.  Patients with confirmed COVID-19 should remain under home isolation precautions for 14 days since the positive COVID-19 test and until the risk of secondary transmission to others is thought to be low. The decision to discontinue home isolation precautions should be made on a case-by-case basis, in consultation with healthcare providers and state and local health departments. Your [facility] Department of Health can be reached at [phone] for further information about COVID-19.      SECONDARY DISCHARGE DIAGNOSES  Diagnosis: Type 2 diabetes mellitus without complication, unspecified whether long term insulin use  Assessment and Plan of Treatment: Continue your medication regimen (Metformin 1000mg twice daily by mouth) and a consistent carbohydrate diet (Meaning eating the same amount of carbohydrates at the same time each day). Monitor blood glucose levels throughout the day before meals and at bedtime. Record blood sugars and bring to outpatient providers appointment in order to be reviewed by your doctor for management modifications. If your sugars are more than 400 or less than 70 you should contact your PCP immediately. Monitor for signs/symptoms of low blood glucose, such as, dizziness, altered mental status, or cool/clammy skin. In addition, monitor for signs/symptoms of high blood glucose, such as, feeling hot, dry, fatigued, or with increased thirst/urination. Make regular podiatry appointments in order to have feet checked for wounds and uncontrolled toe nail growth to prevent infections, as well as, appointments with an ophthalmologist to monitor your vision.  If you do not have an endocrinologist, please call   Endocrine Clinic  [address], [address]  [place] [address]  [phone]    Diagnosis: DVT prophylaxis  Assessment and Plan of Treatment: During your admission you were noted to be high risk for developing blood clots. You had a CT scan that did not show blood clot in the lungs. Please continue your blood thinners as directed for 30 days. Monitor for signs/symptoms of bleeding such as easy bruising, shortness of breath, blood in stool or vomit. If bleeding occurs please notify your primary care provider immediately or return to hospital ED.  **Follow up with your primary care provider within 1 month for further care and monitoring.    Diagnosis: Hypertension, unspecified type  Assessment and Plan of Treatment: Continue blood pressure medication regimen as prescribed. You were previously on losartan which was discontinued due to medication side effect. You will be discharged on lopressor 50mg twice a day. Please continue to take medications as prescribed. Monitor for any visual changes, headaches, dizziness, chest pain or shortness of breath and return to ER if any such symptoms arise. Monitor blood pressure regularly. Please follow up with your primary care provider in 2 weeks    Diagnosis: Depression, unspecified depression type  Assessment and Plan of Treatment: Continue your medications as directed and follow up with your primary care provider/psychiatrist for further evaluation/management. If you are ever in need of immediate psychiatric assistance you may reach out to the [facility] [phone].   [facility]  [address] [facility] [address] [phone]

## 2021-04-18 NOTE — DISCHARGE NOTE PROVIDER - NSDCFUADDAPPT_GEN_ALL_CORE_FT
Please follow up with your primary care provider in 1 to 2 weeks for further care. If you don't have a primary care provider please follow up at our Medicine Clinic at  [facility] [place] [address] [phone] or [phone]  (please call to make appointment)

## 2021-04-18 NOTE — DISCHARGE NOTE PROVIDER - HOSPITAL COURSE
60 year old female with T2DM A1c 7.8 (on Metformin), HTN and depression admitted to medicine for acute respiratory failure with hypoxia and sepsis 2/2 COVID 19.     R/O Fever.    appreciate ID f/u, no evidence of secondary bacterial infection, off abx (received Zosyn x 3 days)   all blood cultures thus far negative, blood cultures repeated 5/1 - NGTD.     Acute respiratory failure with hypoxia.   O2sat 94% on RA, pending ambulatory oxygen --  pt tachycardic d/t deconditioning  continue supportive care  appreciate Pulm eval - the pneumomediastinum is small and can be followed up outpatient.     COVID-19.    Repeat CT Angio chest w/ IV contrast-  no pulmonary embolus, on going multifocal pneumonia due to covid  completed remdesivir and dexamethasone  increasing d-dimer, will prophylactically start therapeutic Lovenox BID, can dc repeat VA duplex given stability in O2  per PT reevaluation recc home w/ PT  pending ambulatory o2 sat to confirm pt can be dc'ed with 2L.  will dc on xarelto for 30 days.    Type 2 diabetes mellitus without complication, unspecified whether long term insulin use.    A1c 7.8  Hold home oral hypoglycemic agents ( Metformin)  Monitor blood sugars qAC and qHS  Lispro low insulin sliding scale  DASH and Consistent carbohydrate diet  c/w Lantus 12 QHS/Admelog 4 TIDAC  can dc on metformin 1000 mg BID.    Hypertension, unspecified type.  Plan: d/c losartan as pt thinks its contributes to her palpitations/tachycardia  will start metoprolol BID, uptitrated to 50 mg po BID 5/1   Echo showed preserved EF with nl LV and RV fxn  troponin neg  TSH wnl.    Hyperlipidemia, unspecified hyperlipidemia type. Plan: - C/w rosuvastatin.    Depression, unspecified depression type.   denies SI  c/w Paxil.     On ___ this case was reviewed with  ____, the patient is medically stable and optimized for discharge. All medications were reviewed and prescriptions were sent to mutually agreed upon pharmacy. 60 year old female with T2DM A1c 7.8 (on Metformin), HTN and depression admitted to medicine for acute respiratory failure with hypoxia and sepsis 2/2 COVID 19.     R/O Fever.    appreciate ID f/u, no evidence of secondary bacterial infection, off abx (received Zosyn x 3 days)   all blood cultures thus far negative, blood cultures repeated 5/1 - NGTD.     Acute respiratory failure with hypoxia.   O2sat 94% on RA, pending ambulatory oxygen --  pt tachycardic d/t deconditioning  continue supportive care  appreciate Pulm eval - the pneumomediastinum is small and can be followed up outpatient.     COVID-19.    Repeat CT Angio chest w/ IV contrast-  no pulmonary embolus, on going multifocal pneumonia due to covid  completed remdesivir and dexamethasone  increasing d-dimer, will prophylactically start therapeutic Lovenox BID, can dc repeat VA duplex given stability in O2  per PT reevaluation recc home w/ PT.  will dc on xarelto for 30 days.    Type 2 diabetes mellitus without complication, unspecified whether long term insulin use.    A1c 7.8  Hold home oral hypoglycemic agents ( Metformin)  Monitor blood sugars qAC and qHS  Lispro low insulin sliding scale  DASH and Consistent carbohydrate diet  c/w Lantus 12 QHS/Admelog 4 TIDAC  will dc on metformin 1000 mg BID.    Hypertension, unspecified type.  Plan: d/c losartan as pt thinks its contributes to her palpitations/tachycardia  will start metoprolol BID, uptitrated to 50 mg po BID 5/1   Echo showed preserved EF with nl LV and RV fxn  troponin neg  TSH wnl.      The  patient is medically stable and optimized for discharge. All medications were reviewed and prescriptions were sent to mutually agreed upon pharmacy.

## 2021-04-18 NOTE — CHART NOTE - NSCHARTNOTEFT_GEN_A_CORE
patient satting 95% at rest on 3L NC  Patient satting 79% with ambulation on 3L NC    Patient will require home o2 at time of discharge.    Alysia Daley PA-C patient satting 95% at rest on 3L NC , Patient satting 79% with ambulation on 3L NC , patient will need continued o2 . Reassess in keya Daley PA-C

## 2021-04-19 ENCOUNTER — APPOINTMENT (OUTPATIENT)
Dept: CARDIOLOGY | Facility: CLINIC | Age: 61
End: 2021-04-19

## 2021-04-19 ENCOUNTER — APPOINTMENT (OUTPATIENT)
Dept: ENDOCRINOLOGY | Facility: CLINIC | Age: 61
End: 2021-04-19

## 2021-04-19 LAB
ALBUMIN SERPL ELPH-MCNC: 3.5 G/DL — SIGNIFICANT CHANGE UP (ref 3.3–5)
ALP SERPL-CCNC: 81 U/L — SIGNIFICANT CHANGE UP (ref 40–120)
ALT FLD-CCNC: 29 U/L — SIGNIFICANT CHANGE UP (ref 4–33)
ANION GAP SERPL CALC-SCNC: 14 MMOL/L — SIGNIFICANT CHANGE UP (ref 7–14)
AST SERPL-CCNC: 20 U/L — SIGNIFICANT CHANGE UP (ref 4–32)
BASOPHILS # BLD AUTO: 0.04 K/UL — SIGNIFICANT CHANGE UP (ref 0–0.2)
BASOPHILS NFR BLD AUTO: 0.3 % — SIGNIFICANT CHANGE UP (ref 0–2)
BILIRUB DIRECT SERPL-MCNC: <0.2 MG/DL — SIGNIFICANT CHANGE UP (ref 0–0.2)
BILIRUB INDIRECT FLD-MCNC: >0.4 MG/DL — SIGNIFICANT CHANGE UP (ref 0–1)
BILIRUB SERPL-MCNC: 0.6 MG/DL — SIGNIFICANT CHANGE UP (ref 0.2–1.2)
BUN SERPL-MCNC: 31 MG/DL — HIGH (ref 7–23)
CALCIUM SERPL-MCNC: 9.4 MG/DL — SIGNIFICANT CHANGE UP (ref 8.4–10.5)
CHLORIDE SERPL-SCNC: 109 MMOL/L — HIGH (ref 98–107)
CO2 SERPL-SCNC: 24 MMOL/L — SIGNIFICANT CHANGE UP (ref 22–31)
CREAT SERPL-MCNC: 0.92 MG/DL — SIGNIFICANT CHANGE UP (ref 0.5–1.3)
CREAT SERPL-MCNC: 0.92 MG/DL — SIGNIFICANT CHANGE UP (ref 0.5–1.3)
CRP SERPL-MCNC: 13 MG/L — HIGH
D DIMER BLD IA.RAPID-MCNC: 3251 NG/ML DDU — HIGH
EOSINOPHIL # BLD AUTO: 0 K/UL — SIGNIFICANT CHANGE UP (ref 0–0.5)
EOSINOPHIL NFR BLD AUTO: 0 % — SIGNIFICANT CHANGE UP (ref 0–6)
FERRITIN SERPL-MCNC: 703 NG/ML — HIGH (ref 15–150)
GLUCOSE BLDC GLUCOMTR-MCNC: 393 MG/DL — HIGH (ref 70–99)
GLUCOSE SERPL-MCNC: 216 MG/DL — HIGH (ref 70–99)
HCT VFR BLD CALC: 40.6 % — SIGNIFICANT CHANGE UP (ref 34.5–45)
HGB BLD-MCNC: 13.1 G/DL — SIGNIFICANT CHANGE UP (ref 11.5–15.5)
IANC: 13.55 K/UL — HIGH (ref 1.5–8.5)
IMM GRANULOCYTES NFR BLD AUTO: 2.9 % — HIGH (ref 0–1.5)
LDH SERPL L TO P-CCNC: 418 U/L — HIGH (ref 135–225)
LYMPHOCYTES # BLD AUTO: 0.74 K/UL — LOW (ref 1–3.3)
LYMPHOCYTES # BLD AUTO: 4.7 % — LOW (ref 13–44)
MAGNESIUM SERPL-MCNC: 2.9 MG/DL — HIGH (ref 1.6–2.6)
MCHC RBC-ENTMCNC: 27.8 PG — SIGNIFICANT CHANGE UP (ref 27–34)
MCHC RBC-ENTMCNC: 32.3 GM/DL — SIGNIFICANT CHANGE UP (ref 32–36)
MCV RBC AUTO: 86 FL — SIGNIFICANT CHANGE UP (ref 80–100)
MONOCYTES # BLD AUTO: 0.84 K/UL — SIGNIFICANT CHANGE UP (ref 0–0.9)
MONOCYTES NFR BLD AUTO: 5.4 % — SIGNIFICANT CHANGE UP (ref 2–14)
NEUTROPHILS # BLD AUTO: 13.55 K/UL — HIGH (ref 1.8–7.4)
NEUTROPHILS NFR BLD AUTO: 86.7 % — HIGH (ref 43–77)
NRBC # BLD: 0 /100 WBCS — SIGNIFICANT CHANGE UP
NRBC # FLD: 0.02 K/UL — HIGH
PHOSPHATE SERPL-MCNC: 3 MG/DL — SIGNIFICANT CHANGE UP (ref 2.5–4.5)
PLATELET # BLD AUTO: 650 K/UL — HIGH (ref 150–400)
POTASSIUM SERPL-MCNC: 4.2 MMOL/L — SIGNIFICANT CHANGE UP (ref 3.5–5.3)
POTASSIUM SERPL-SCNC: 4.2 MMOL/L — SIGNIFICANT CHANGE UP (ref 3.5–5.3)
PROCALCITONIN SERPL-MCNC: 0.06 NG/ML — SIGNIFICANT CHANGE UP (ref 0.02–0.1)
PROT SERPL-MCNC: 7.5 G/DL — SIGNIFICANT CHANGE UP (ref 6–8.3)
RBC # BLD: 4.72 M/UL — SIGNIFICANT CHANGE UP (ref 3.8–5.2)
RBC # FLD: 15.5 % — HIGH (ref 10.3–14.5)
SODIUM SERPL-SCNC: 147 MMOL/L — HIGH (ref 135–145)
WBC # BLD: 15.62 K/UL — HIGH (ref 3.8–10.5)
WBC # FLD AUTO: 15.62 K/UL — HIGH (ref 3.8–10.5)

## 2021-04-19 PROCEDURE — 93970 EXTREMITY STUDY: CPT | Mod: 26

## 2021-04-19 PROCEDURE — 99233 SBSQ HOSP IP/OBS HIGH 50: CPT

## 2021-04-19 RX ORDER — INSULIN GLARGINE 100 [IU]/ML
18 INJECTION, SOLUTION SUBCUTANEOUS AT BEDTIME
Refills: 0 | Status: DISCONTINUED | OUTPATIENT
Start: 2021-04-19 | End: 2021-04-21

## 2021-04-19 RX ORDER — INSULIN LISPRO 100/ML
6 VIAL (ML) SUBCUTANEOUS
Refills: 0 | Status: DISCONTINUED | OUTPATIENT
Start: 2021-04-19 | End: 2021-04-21

## 2021-04-19 RX ADMIN — Medication 1 PUFF(S): at 22:15

## 2021-04-19 RX ADMIN — Medication 81 MILLIGRAM(S): at 12:42

## 2021-04-19 RX ADMIN — Medication 10 MILLIGRAM(S): at 12:42

## 2021-04-19 RX ADMIN — Medication 6 MILLIGRAM(S): at 05:21

## 2021-04-19 RX ADMIN — ALBUTEROL 2 PUFF(S): 90 AEROSOL, METERED ORAL at 09:00

## 2021-04-19 RX ADMIN — PANTOPRAZOLE SODIUM 40 MILLIGRAM(S): 20 TABLET, DELAYED RELEASE ORAL at 08:57

## 2021-04-19 RX ADMIN — Medication 5 UNIT(S): at 13:52

## 2021-04-19 RX ADMIN — LOSARTAN POTASSIUM 100 MILLIGRAM(S): 100 TABLET, FILM COATED ORAL at 05:21

## 2021-04-19 RX ADMIN — Medication 6: at 13:51

## 2021-04-19 RX ADMIN — ALBUTEROL 2 PUFF(S): 90 AEROSOL, METERED ORAL at 05:22

## 2021-04-19 RX ADMIN — Medication 1 PUFF(S): at 05:25

## 2021-04-19 RX ADMIN — Medication 6 UNIT(S): at 17:40

## 2021-04-19 RX ADMIN — ALBUTEROL 2 PUFF(S): 90 AEROSOL, METERED ORAL at 22:15

## 2021-04-19 RX ADMIN — ALBUTEROL 2 PUFF(S): 90 AEROSOL, METERED ORAL at 17:37

## 2021-04-19 RX ADMIN — INSULIN GLARGINE 18 UNIT(S): 100 INJECTION, SOLUTION SUBCUTANEOUS at 22:15

## 2021-04-19 RX ADMIN — Medication 10: at 09:59

## 2021-04-19 RX ADMIN — Medication 1 PUFF(S): at 17:36

## 2021-04-19 RX ADMIN — Medication 5 UNIT(S): at 09:59

## 2021-04-19 RX ADMIN — ENOXAPARIN SODIUM 40 MILLIGRAM(S): 100 INJECTION SUBCUTANEOUS at 12:42

## 2021-04-19 RX ADMIN — Medication 1 PUFF(S): at 09:00

## 2021-04-19 NOTE — PROGRESS NOTE ADULT - PROBLEM SELECTOR PLAN 2
- CT Angio chest w/ IV contrast-  no pulmonary embolus.  Extensive opacities are noted throughout both lungs. They're consistent with the patient's known history of Covid pneumonia.  - Monitor IM -> D-dimer significantly elevated today, check b/l LE dopplers to r/o DVT  - C/w decadron, remdesivir completed  - supportive care with PRN cough medications, Tylenol, albuterol   - encourage incentive spirometer use

## 2021-04-19 NOTE — PROGRESS NOTE ADULT - SUBJECTIVE AND OBJECTIVE BOX
Patient is a 60y old  Female who presents with a chief complaint of shortness of breath (18 Apr 2021 11:57)        SUBJECTIVE / OVERNIGHT EVENTS:    no acute events o/n  pt states sob improved, wants to go home tomorrow if possible  no cp  no abd pain      MEDICATIONS  (STANDING):  ALBUTerol    90 MICROgram(s) HFA Inhaler 1 Puff(s) Inhalation every 4 hours  ALBUTerol    90 MICROgram(s) HFA Inhaler 2 Puff(s) Inhalation every 6 hours  aspirin enteric coated 81 milliGRAM(s) Oral daily  dexAMETHasone  Injectable 6 milliGRAM(s) IV Push daily  dextrose 40% Gel 15 Gram(s) Oral once  dextrose 5%. 1000 milliLiter(s) (50 mL/Hr) IV Continuous <Continuous>  dextrose 5%. 1000 milliLiter(s) (100 mL/Hr) IV Continuous <Continuous>  dextrose 50% Injectable 25 Gram(s) IV Push once  dextrose 50% Injectable 12.5 Gram(s) IV Push once  dextrose 50% Injectable 25 Gram(s) IV Push once  enoxaparin Injectable 40 milliGRAM(s) SubCutaneous daily  glucagon  Injectable 1 milliGRAM(s) IntraMuscular once  insulin glargine Injectable (LANTUS) 15 Unit(s) SubCutaneous at bedtime  insulin lispro (ADMELOG) corrective regimen sliding scale   SubCutaneous three times a day before meals  insulin lispro (ADMELOG) corrective regimen sliding scale   SubCutaneous at bedtime  insulin lispro Injectable (ADMELOG) 5 Unit(s) SubCutaneous three times a day before meals  ipratropium 17 MICROgram(s) HFA Inhaler 1 Puff(s) Inhalation every 6 hours  losartan 100 milliGRAM(s) Oral daily  pantoprazole    Tablet 40 milliGRAM(s) Oral before breakfast  PARoxetine 10 milliGRAM(s) Oral daily  polyethylene glycol 3350 17 Gram(s) Oral daily  senna 2 Tablet(s) Oral at bedtime  tiotropium 18 MICROgram(s) Capsule 1 Capsule(s) Inhalation daily    MEDICATIONS  (PRN):  acetaminophen   Tablet .. 650 milliGRAM(s) Oral every 6 hours PRN Temp greater or equal to 38C (100.4F), Mild Pain (1 - 3), Moderate Pain (4 - 6)  aluminum hydroxide/magnesium hydroxide/simethicone Suspension 30 milliLiter(s) Oral every 4 hours PRN Dyspepsia  benzonatate 100 milliGRAM(s) Oral every 8 hours PRN Cough  LORazepam   Injectable 0.5 milliGRAM(s) IV Push every 6 hours PRN Anxiety      Vital Signs Last 24 Hrs  T(C): 37.2 (19 Apr 2021 13:52), Max: 37.2 (19 Apr 2021 13:52)  T(F): 98.9 (19 Apr 2021 13:52), Max: 98.9 (19 Apr 2021 13:52)  HR: 99 (19 Apr 2021 13:52) (96 - 99)  BP: 128/86 (19 Apr 2021 13:52) (128/86 - 156/82)  BP(mean): --  RR: 19 (19 Apr 2021 13:52) (18 - 19)  SpO2: 92% (19 Apr 2021 13:52) (84% - 93%)  CAPILLARY BLOOD GLUCOSE      POCT Blood Glucose.: 265 mg/dL (19 Apr 2021 13:49)  POCT Blood Glucose.: 393 mg/dL (19 Apr 2021 09:49)  POCT Blood Glucose.: 214 mg/dL (18 Apr 2021 21:17)  POCT Blood Glucose.: 242 mg/dL (18 Apr 2021 17:41)    I&O's Summary        PHYSICAL EXAM  GENERAL: NAD, well-developed  HEAD:  Atraumatic, Normocephalic  EYES: EOMI, PERRLA, conjunctiva and sclera clear  NECK: Supple, No JVD  CHEST/LUNG: Clear to auscultation bilaterally; No wheeze  HEART: Regular rate and rhythm; No murmurs, rubs, or gallops  ABDOMEN: Soft, Nontender, Nondistended; Bowel sounds present  EXTREMITIES:  2+ Peripheral Pulses, No clubbing, cyanosis, or edema    LABS:                        13.1   15.62 )-----------( 650      ( 19 Apr 2021 08:51 )             40.6     04-19    147<H>  |  109<H>  |  31<H>  ----------------------------<  216<H>  4.2   |  24  |  0.92    Ca    9.4      19 Apr 2021 08:50  Phos  3.0     04-19  Mg     2.9     04-19    TPro  x   /  Alb  x   /  TBili  x   /  DBili  <0.2  /  AST  x   /  ALT  x   /  AlkPhos  x   04-19              RADIOLOGY & ADDITIONAL TESTS:    Imaging Personally Reviewed:  Consultant(s) Notes Reviewed:    Care Discussed with Consultants/Other Providers:

## 2021-04-19 NOTE — PROGRESS NOTE ADULT - PROBLEM SELECTOR PLAN 1
- Acute hypoxemic respiratory failure 2/2 COVID 19 PNA   - continuous pulse oximetry, wean off supplemental oxygen as tolerated

## 2021-04-20 LAB
ALBUMIN SERPL ELPH-MCNC: 3.4 G/DL — SIGNIFICANT CHANGE UP (ref 3.3–5)
ALP SERPL-CCNC: 90 U/L — SIGNIFICANT CHANGE UP (ref 40–120)
ALT FLD-CCNC: 26 U/L — SIGNIFICANT CHANGE UP (ref 4–33)
AST SERPL-CCNC: 25 U/L — SIGNIFICANT CHANGE UP (ref 4–32)
BASOPHILS # BLD AUTO: 0.06 K/UL — SIGNIFICANT CHANGE UP (ref 0–0.2)
BASOPHILS NFR BLD AUTO: 0.4 % — SIGNIFICANT CHANGE UP (ref 0–2)
BILIRUB DIRECT SERPL-MCNC: <0.2 MG/DL — SIGNIFICANT CHANGE UP (ref 0–0.2)
BILIRUB INDIRECT FLD-MCNC: >0.3 MG/DL — SIGNIFICANT CHANGE UP (ref 0–1)
BILIRUB SERPL-MCNC: 0.5 MG/DL — SIGNIFICANT CHANGE UP (ref 0.2–1.2)
CREAT SERPL-MCNC: 1.05 MG/DL — SIGNIFICANT CHANGE UP (ref 0.5–1.3)
EOSINOPHIL # BLD AUTO: 0.01 K/UL — SIGNIFICANT CHANGE UP (ref 0–0.5)
EOSINOPHIL NFR BLD AUTO: 0.1 % — SIGNIFICANT CHANGE UP (ref 0–6)
HCT VFR BLD CALC: 42.7 % — SIGNIFICANT CHANGE UP (ref 34.5–45)
HGB BLD-MCNC: 13.3 G/DL — SIGNIFICANT CHANGE UP (ref 11.5–15.5)
IANC: 14.39 K/UL — HIGH (ref 1.5–8.5)
IMM GRANULOCYTES NFR BLD AUTO: 3.1 % — HIGH (ref 0–1.5)
LYMPHOCYTES # BLD AUTO: 0.82 K/UL — LOW (ref 1–3.3)
LYMPHOCYTES # BLD AUTO: 4.9 % — LOW (ref 13–44)
MCHC RBC-ENTMCNC: 27.4 PG — SIGNIFICANT CHANGE UP (ref 27–34)
MCHC RBC-ENTMCNC: 31.1 GM/DL — LOW (ref 32–36)
MCV RBC AUTO: 88 FL — SIGNIFICANT CHANGE UP (ref 80–100)
MONOCYTES # BLD AUTO: 0.84 K/UL — SIGNIFICANT CHANGE UP (ref 0–0.9)
MONOCYTES NFR BLD AUTO: 5.1 % — SIGNIFICANT CHANGE UP (ref 2–14)
NEUTROPHILS # BLD AUTO: 14.39 K/UL — HIGH (ref 1.8–7.4)
NEUTROPHILS NFR BLD AUTO: 86.4 % — HIGH (ref 43–77)
NRBC # BLD: 0 /100 WBCS — SIGNIFICANT CHANGE UP
NRBC # FLD: 0.05 K/UL — HIGH
PLATELET # BLD AUTO: 635 K/UL — HIGH (ref 150–400)
PROT SERPL-MCNC: 7.4 G/DL — SIGNIFICANT CHANGE UP (ref 6–8.3)
RBC # BLD: 4.85 M/UL — SIGNIFICANT CHANGE UP (ref 3.8–5.2)
RBC # FLD: 15.7 % — HIGH (ref 10.3–14.5)
WBC # BLD: 16.63 K/UL — HIGH (ref 3.8–10.5)
WBC # FLD AUTO: 16.63 K/UL — HIGH (ref 3.8–10.5)

## 2021-04-20 PROCEDURE — 99233 SBSQ HOSP IP/OBS HIGH 50: CPT

## 2021-04-20 RX ADMIN — ALBUTEROL 2 PUFF(S): 90 AEROSOL, METERED ORAL at 19:03

## 2021-04-20 RX ADMIN — LOSARTAN POTASSIUM 100 MILLIGRAM(S): 100 TABLET, FILM COATED ORAL at 06:04

## 2021-04-20 RX ADMIN — ENOXAPARIN SODIUM 40 MILLIGRAM(S): 100 INJECTION SUBCUTANEOUS at 13:59

## 2021-04-20 RX ADMIN — Medication 1 PUFF(S): at 19:03

## 2021-04-20 RX ADMIN — Medication 6 UNIT(S): at 14:09

## 2021-04-20 RX ADMIN — Medication 4: at 18:09

## 2021-04-20 RX ADMIN — INSULIN GLARGINE 18 UNIT(S): 100 INJECTION, SOLUTION SUBCUTANEOUS at 21:52

## 2021-04-20 RX ADMIN — Medication 10 MILLIGRAM(S): at 14:00

## 2021-04-20 RX ADMIN — Medication 4: at 13:59

## 2021-04-20 RX ADMIN — Medication 4: at 09:55

## 2021-04-20 RX ADMIN — PANTOPRAZOLE SODIUM 40 MILLIGRAM(S): 20 TABLET, DELAYED RELEASE ORAL at 06:04

## 2021-04-20 RX ADMIN — Medication 2: at 21:38

## 2021-04-20 RX ADMIN — Medication 81 MILLIGRAM(S): at 13:59

## 2021-04-20 RX ADMIN — SENNA PLUS 2 TABLET(S): 8.6 TABLET ORAL at 21:40

## 2021-04-20 RX ADMIN — ALBUTEROL 2 PUFF(S): 90 AEROSOL, METERED ORAL at 10:05

## 2021-04-20 RX ADMIN — ALBUTEROL 2 PUFF(S): 90 AEROSOL, METERED ORAL at 03:35

## 2021-04-20 RX ADMIN — Medication 6 MILLIGRAM(S): at 06:04

## 2021-04-20 RX ADMIN — Medication 1 PUFF(S): at 03:35

## 2021-04-20 RX ADMIN — Medication 1 PUFF(S): at 10:05

## 2021-04-20 NOTE — PROGRESS NOTE ADULT - PROBLEM SELECTOR PLAN 2
- CT Angio chest w/ IV contrast-  no pulmonary embolus.  Extensive opacities are noted throughout both lungs. They're consistent with the patient's known history of Covid pneumonia.  - Monitor IM -> D-dimer significantly elevated today, check b/l LE dopplers to r/o DVT - NEG  - C/w decadron, remdesivir completed  - supportive care with PRN cough medications, Tylenol, albuterol   - encourage incentive spirometer use

## 2021-04-20 NOTE — CHART NOTE - NSCHARTNOTEFT_GEN_A_CORE
Patient admitted with acute hypoxemic respiratory failure 2/2 COVID 19 infection. Patient's SpO2 at rest is 85% on room air. Patient will require supplemental home oxygen Patient admitted with acute hypoxemic respiratory failure 2/2 COVID 19 infection. Patient's SpO2 at rest is 85% on room air. Patient will require supplemental home oxygen. Patient should be discharged on 3L oxygen.

## 2021-04-20 NOTE — PROGRESS NOTE ADULT - PROBLEM SELECTOR PLAN 3
-A1c 7.8  -Hold home oral hypoglycemic agents ( Metformin)  -Monitor blood sugars qAC and qHS  -Lispro low insulin sliding scale  -DASH and Consistent carbohydrate diet  -Now likely with steroid induced hyperglycemia, added basal/bolus regimen, uptitrate PRN

## 2021-04-20 NOTE — PROGRESS NOTE ADULT - SUBJECTIVE AND OBJECTIVE BOX
Patient is a 60y old  Female who presents with a chief complaint of shortness of breath (19 Apr 2021 15:20)        SUBJECTIVE / OVERNIGHT EVENTS:  no acute events o/n  pt states SOB improved  denies CP  no abd pain    MEDICATIONS  (STANDING):  ALBUTerol    90 MICROgram(s) HFA Inhaler 1 Puff(s) Inhalation every 4 hours  ALBUTerol    90 MICROgram(s) HFA Inhaler 2 Puff(s) Inhalation every 6 hours  aspirin enteric coated 81 milliGRAM(s) Oral daily  dexAMETHasone  Injectable 6 milliGRAM(s) IV Push daily  dextrose 40% Gel 15 Gram(s) Oral once  dextrose 5%. 1000 milliLiter(s) (50 mL/Hr) IV Continuous <Continuous>  dextrose 5%. 1000 milliLiter(s) (100 mL/Hr) IV Continuous <Continuous>  dextrose 50% Injectable 25 Gram(s) IV Push once  dextrose 50% Injectable 12.5 Gram(s) IV Push once  dextrose 50% Injectable 25 Gram(s) IV Push once  enoxaparin Injectable 40 milliGRAM(s) SubCutaneous daily  glucagon  Injectable 1 milliGRAM(s) IntraMuscular once  insulin glargine Injectable (LANTUS) 18 Unit(s) SubCutaneous at bedtime  insulin lispro (ADMELOG) corrective regimen sliding scale   SubCutaneous three times a day before meals  insulin lispro (ADMELOG) corrective regimen sliding scale   SubCutaneous at bedtime  insulin lispro Injectable (ADMELOG) 6 Unit(s) SubCutaneous three times a day before meals  ipratropium 17 MICROgram(s) HFA Inhaler 1 Puff(s) Inhalation every 6 hours  losartan 100 milliGRAM(s) Oral daily  pantoprazole    Tablet 40 milliGRAM(s) Oral before breakfast  PARoxetine 10 milliGRAM(s) Oral daily  polyethylene glycol 3350 17 Gram(s) Oral daily  senna 2 Tablet(s) Oral at bedtime  tiotropium 18 MICROgram(s) Capsule 1 Capsule(s) Inhalation daily    MEDICATIONS  (PRN):  acetaminophen   Tablet .. 650 milliGRAM(s) Oral every 6 hours PRN Temp greater or equal to 38C (100.4F), Mild Pain (1 - 3), Moderate Pain (4 - 6)  aluminum hydroxide/magnesium hydroxide/simethicone Suspension 30 milliLiter(s) Oral every 4 hours PRN Dyspepsia  benzonatate 100 milliGRAM(s) Oral every 8 hours PRN Cough  LORazepam   Injectable 0.5 milliGRAM(s) IV Push every 6 hours PRN Anxiety      Vital Signs Last 24 Hrs  T(C): 36.8 (20 Apr 2021 11:46), Max: 37 (20 Apr 2021 05:54)  T(F): 98.3 (20 Apr 2021 11:46), Max: 98.6 (20 Apr 2021 05:54)  HR: 117 (20 Apr 2021 11:46) (102 - 117)  BP: 150/86 (20 Apr 2021 11:46) (127/85 - 150/86)  BP(mean): --  RR: 18 (20 Apr 2021 11:48) (16 - 18)  SpO2: 92% (20 Apr 2021 11:59) (85% - 94%)  CAPILLARY BLOOD GLUCOSE      POCT Blood Glucose.: 229 mg/dL (20 Apr 2021 13:45)  POCT Blood Glucose.: 201 mg/dL (20 Apr 2021 09:11)  POCT Blood Glucose.: 135 mg/dL (19 Apr 2021 21:18)  POCT Blood Glucose.: 140 mg/dL (19 Apr 2021 17:38)    I&O's Summary    19 Apr 2021 07:01  -  20 Apr 2021 07:00  --------------------------------------------------------  IN: 1440 mL / OUT: 760 mL / NET: 680 mL        PHYSICAL EXAM  GENERAL: NAD, well-developed  HEAD:  Atraumatic, Normocephalic  EYES: EOMI, PERRLA, conjunctiva and sclera clear  NECK: Supple, No JVD  CHEST/LUNG: Clear to auscultation bilaterally; No wheeze  HEART: Regular rate and rhythm; No murmurs, rubs, or gallops  ABDOMEN: Soft, Nontender, Nondistended; Bowel sounds present  EXTREMITIES:  2+ Peripheral Pulses, No clubbing, cyanosis, or edema      LABS:                        13.3   16.63 )-----------( 635      ( 20 Apr 2021 07:41 )             42.7     04-20    x   |  x   |  x   ----------------------------<  x   x    |  x   |  1.05    Ca    9.4      19 Apr 2021 08:50  Phos  3.0     04-19  Mg     2.9     04-19    TPro  7.4  /  Alb  3.4  /  TBili  0.5  /  DBili  <0.2  /  AST  25  /  ALT  26  /  AlkPhos  90  04-20              RADIOLOGY & ADDITIONAL TESTS:    Imaging Personally Reviewed:  Consultant(s) Notes Reviewed:    Care Discussed with Consultants/Other Providers:

## 2021-04-21 LAB
ALBUMIN SERPL ELPH-MCNC: 3.1 G/DL — LOW (ref 3.3–5)
ALBUMIN SERPL ELPH-MCNC: 3.1 G/DL — LOW (ref 3.3–5)
ALP SERPL-CCNC: 81 U/L — SIGNIFICANT CHANGE UP (ref 40–120)
ALP SERPL-CCNC: 83 U/L — SIGNIFICANT CHANGE UP (ref 40–120)
ALT FLD-CCNC: 19 U/L — SIGNIFICANT CHANGE UP (ref 4–33)
ALT FLD-CCNC: 20 U/L — SIGNIFICANT CHANGE UP (ref 4–33)
ANION GAP SERPL CALC-SCNC: 8 MMOL/L — SIGNIFICANT CHANGE UP (ref 7–14)
AST SERPL-CCNC: 17 U/L — SIGNIFICANT CHANGE UP (ref 4–32)
AST SERPL-CCNC: 18 U/L — SIGNIFICANT CHANGE UP (ref 4–32)
BASOPHILS # BLD AUTO: 0.05 K/UL — SIGNIFICANT CHANGE UP (ref 0–0.2)
BASOPHILS NFR BLD AUTO: 0.3 % — SIGNIFICANT CHANGE UP (ref 0–2)
BILIRUB DIRECT SERPL-MCNC: <0.2 MG/DL — SIGNIFICANT CHANGE UP (ref 0–0.2)
BILIRUB INDIRECT FLD-MCNC: >0.3 MG/DL — SIGNIFICANT CHANGE UP (ref 0–1)
BILIRUB SERPL-MCNC: 0.5 MG/DL — SIGNIFICANT CHANGE UP (ref 0.2–1.2)
BILIRUB SERPL-MCNC: 0.5 MG/DL — SIGNIFICANT CHANGE UP (ref 0.2–1.2)
BUN SERPL-MCNC: 26 MG/DL — HIGH (ref 7–23)
CALCIUM SERPL-MCNC: 8.7 MG/DL — SIGNIFICANT CHANGE UP (ref 8.4–10.5)
CHLORIDE SERPL-SCNC: 108 MMOL/L — HIGH (ref 98–107)
CO2 SERPL-SCNC: 24 MMOL/L — SIGNIFICANT CHANGE UP (ref 22–31)
CREAT SERPL-MCNC: 0.87 MG/DL — SIGNIFICANT CHANGE UP (ref 0.5–1.3)
CREAT SERPL-MCNC: 0.87 MG/DL — SIGNIFICANT CHANGE UP (ref 0.5–1.3)
EOSINOPHIL # BLD AUTO: 0.06 K/UL — SIGNIFICANT CHANGE UP (ref 0–0.5)
EOSINOPHIL NFR BLD AUTO: 0.4 % — SIGNIFICANT CHANGE UP (ref 0–6)
GLUCOSE SERPL-MCNC: 225 MG/DL — HIGH (ref 70–99)
HCT VFR BLD CALC: 38.8 % — SIGNIFICANT CHANGE UP (ref 34.5–45)
HGB BLD-MCNC: 12.7 G/DL — SIGNIFICANT CHANGE UP (ref 11.5–15.5)
IANC: 13.91 K/UL — HIGH (ref 1.5–8.5)
IMM GRANULOCYTES NFR BLD AUTO: 2.9 % — HIGH (ref 0–1.5)
LYMPHOCYTES # BLD AUTO: 0.69 K/UL — LOW (ref 1–3.3)
LYMPHOCYTES # BLD AUTO: 4.3 % — LOW (ref 13–44)
MCHC RBC-ENTMCNC: 28 PG — SIGNIFICANT CHANGE UP (ref 27–34)
MCHC RBC-ENTMCNC: 32.7 GM/DL — SIGNIFICANT CHANGE UP (ref 32–36)
MCV RBC AUTO: 85.5 FL — SIGNIFICANT CHANGE UP (ref 80–100)
MONOCYTES # BLD AUTO: 0.75 K/UL — SIGNIFICANT CHANGE UP (ref 0–0.9)
MONOCYTES NFR BLD AUTO: 4.7 % — SIGNIFICANT CHANGE UP (ref 2–14)
NEUTROPHILS # BLD AUTO: 13.91 K/UL — HIGH (ref 1.8–7.4)
NEUTROPHILS NFR BLD AUTO: 87.4 % — HIGH (ref 43–77)
NRBC # BLD: 0 /100 WBCS — SIGNIFICANT CHANGE UP
NRBC # FLD: 0.04 K/UL — HIGH
PLATELET # BLD AUTO: 662 K/UL — HIGH (ref 150–400)
POTASSIUM SERPL-MCNC: 3.9 MMOL/L — SIGNIFICANT CHANGE UP (ref 3.5–5.3)
POTASSIUM SERPL-SCNC: 3.9 MMOL/L — SIGNIFICANT CHANGE UP (ref 3.5–5.3)
PROT SERPL-MCNC: 6.7 G/DL — SIGNIFICANT CHANGE UP (ref 6–8.3)
PROT SERPL-MCNC: 6.7 G/DL — SIGNIFICANT CHANGE UP (ref 6–8.3)
RBC # BLD: 4.54 M/UL — SIGNIFICANT CHANGE UP (ref 3.8–5.2)
RBC # FLD: 15.3 % — HIGH (ref 10.3–14.5)
SODIUM SERPL-SCNC: 140 MMOL/L — SIGNIFICANT CHANGE UP (ref 135–145)
WBC # BLD: 15.92 K/UL — HIGH (ref 3.8–10.5)
WBC # FLD AUTO: 15.92 K/UL — HIGH (ref 3.8–10.5)

## 2021-04-21 PROCEDURE — 93010 ELECTROCARDIOGRAM REPORT: CPT

## 2021-04-21 PROCEDURE — 99232 SBSQ HOSP IP/OBS MODERATE 35: CPT

## 2021-04-21 RX ORDER — INSULIN LISPRO 100/ML
7 VIAL (ML) SUBCUTANEOUS
Refills: 0 | Status: DISCONTINUED | OUTPATIENT
Start: 2021-04-21 | End: 2021-04-25

## 2021-04-21 RX ORDER — INSULIN GLARGINE 100 [IU]/ML
20 INJECTION, SOLUTION SUBCUTANEOUS AT BEDTIME
Refills: 0 | Status: DISCONTINUED | OUTPATIENT
Start: 2021-04-21 | End: 2021-04-25

## 2021-04-21 RX ADMIN — Medication 2: at 21:24

## 2021-04-21 RX ADMIN — Medication 1 PUFF(S): at 12:08

## 2021-04-21 RX ADMIN — ALBUTEROL 2 PUFF(S): 90 AEROSOL, METERED ORAL at 17:49

## 2021-04-21 RX ADMIN — Medication 4: at 09:55

## 2021-04-21 RX ADMIN — PANTOPRAZOLE SODIUM 40 MILLIGRAM(S): 20 TABLET, DELAYED RELEASE ORAL at 05:16

## 2021-04-21 RX ADMIN — Medication 1 PUFF(S): at 17:49

## 2021-04-21 RX ADMIN — LOSARTAN POTASSIUM 100 MILLIGRAM(S): 100 TABLET, FILM COATED ORAL at 05:15

## 2021-04-21 RX ADMIN — Medication 4: at 13:39

## 2021-04-21 RX ADMIN — Medication 100 MILLIGRAM(S): at 12:07

## 2021-04-21 RX ADMIN — Medication 7 UNIT(S): at 18:04

## 2021-04-21 RX ADMIN — Medication 81 MILLIGRAM(S): at 12:07

## 2021-04-21 RX ADMIN — INSULIN GLARGINE 20 UNIT(S): 100 INJECTION, SOLUTION SUBCUTANEOUS at 21:23

## 2021-04-21 RX ADMIN — Medication 6 MILLIGRAM(S): at 05:14

## 2021-04-21 RX ADMIN — ENOXAPARIN SODIUM 40 MILLIGRAM(S): 100 INJECTION SUBCUTANEOUS at 12:06

## 2021-04-21 RX ADMIN — Medication 4: at 18:05

## 2021-04-21 RX ADMIN — ALBUTEROL 2 PUFF(S): 90 AEROSOL, METERED ORAL at 05:13

## 2021-04-21 RX ADMIN — Medication 0.5 MILLIGRAM(S): at 12:07

## 2021-04-21 RX ADMIN — Medication 1 PUFF(S): at 05:14

## 2021-04-21 RX ADMIN — ALBUTEROL 2 PUFF(S): 90 AEROSOL, METERED ORAL at 09:56

## 2021-04-21 RX ADMIN — Medication 1 PUFF(S): at 21:21

## 2021-04-21 RX ADMIN — Medication 6 UNIT(S): at 13:39

## 2021-04-21 RX ADMIN — Medication 6 UNIT(S): at 09:55

## 2021-04-21 RX ADMIN — Medication 10 MILLIGRAM(S): at 12:07

## 2021-04-21 RX ADMIN — ALBUTEROL 2 PUFF(S): 90 AEROSOL, METERED ORAL at 21:21

## 2021-04-21 RX ADMIN — POLYETHYLENE GLYCOL 3350 17 GRAM(S): 17 POWDER, FOR SOLUTION ORAL at 12:06

## 2021-04-21 RX ADMIN — SENNA PLUS 2 TABLET(S): 8.6 TABLET ORAL at 21:22

## 2021-04-21 NOTE — PROGRESS NOTE ADULT - SUBJECTIVE AND OBJECTIVE BOX
Patient is a 60y old  Female who presents with a chief complaint of shortness of breath (20 Apr 2021 14:19)        SUBJECTIVE / OVERNIGHT EVENTS:  no acute events o/n  pt feels well  wants to go home soon      MEDICATIONS  (STANDING):  ALBUTerol    90 MICROgram(s) HFA Inhaler 1 Puff(s) Inhalation every 4 hours  ALBUTerol    90 MICROgram(s) HFA Inhaler 2 Puff(s) Inhalation every 6 hours  aspirin enteric coated 81 milliGRAM(s) Oral daily  dexAMETHasone  Injectable 6 milliGRAM(s) IV Push daily  dextrose 40% Gel 15 Gram(s) Oral once  dextrose 5%. 1000 milliLiter(s) (50 mL/Hr) IV Continuous <Continuous>  dextrose 5%. 1000 milliLiter(s) (100 mL/Hr) IV Continuous <Continuous>  dextrose 50% Injectable 25 Gram(s) IV Push once  dextrose 50% Injectable 12.5 Gram(s) IV Push once  dextrose 50% Injectable 25 Gram(s) IV Push once  enoxaparin Injectable 40 milliGRAM(s) SubCutaneous daily  glucagon  Injectable 1 milliGRAM(s) IntraMuscular once  insulin glargine Injectable (LANTUS) 18 Unit(s) SubCutaneous at bedtime  insulin lispro (ADMELOG) corrective regimen sliding scale   SubCutaneous three times a day before meals  insulin lispro (ADMELOG) corrective regimen sliding scale   SubCutaneous at bedtime  insulin lispro Injectable (ADMELOG) 6 Unit(s) SubCutaneous three times a day before meals  ipratropium 17 MICROgram(s) HFA Inhaler 1 Puff(s) Inhalation every 6 hours  losartan 100 milliGRAM(s) Oral daily  pantoprazole    Tablet 40 milliGRAM(s) Oral before breakfast  PARoxetine 10 milliGRAM(s) Oral daily  polyethylene glycol 3350 17 Gram(s) Oral daily  senna 2 Tablet(s) Oral at bedtime  tiotropium 18 MICROgram(s) Capsule 1 Capsule(s) Inhalation daily    MEDICATIONS  (PRN):  acetaminophen   Tablet .. 650 milliGRAM(s) Oral every 6 hours PRN Temp greater or equal to 38C (100.4F), Mild Pain (1 - 3), Moderate Pain (4 - 6)  aluminum hydroxide/magnesium hydroxide/simethicone Suspension 30 milliLiter(s) Oral every 4 hours PRN Dyspepsia  benzonatate 100 milliGRAM(s) Oral every 8 hours PRN Cough  LORazepam   Injectable 0.5 milliGRAM(s) IV Push every 6 hours PRN Anxiety      Vital Signs Last 24 Hrs  T(C): 36.7 (21 Apr 2021 11:30), Max: 37.1 (21 Apr 2021 05:07)  T(F): 98.1 (21 Apr 2021 11:30), Max: 98.7 (21 Apr 2021 05:07)  HR: 125 (21 Apr 2021 11:30) (107 - 125)  BP: 122/74 (21 Apr 2021 11:30) (122/74 - 147/92)  BP(mean): --  RR: 19 (21 Apr 2021 11:30) (18 - 19)  SpO2: 96% (21 Apr 2021 11:30) (95% - 97%)  CAPILLARY BLOOD GLUCOSE      POCT Blood Glucose.: 244 mg/dL (21 Apr 2021 13:36)  POCT Blood Glucose.: 211 mg/dL (21 Apr 2021 09:26)  POCT Blood Glucose.: 286 mg/dL (20 Apr 2021 21:15)  POCT Blood Glucose.: 246 mg/dL (20 Apr 2021 17:33)    I&O's Summary    20 Apr 2021 07:01  -  21 Apr 2021 07:00  --------------------------------------------------------  IN: 160 mL / OUT: 600 mL / NET: -440 mL              PHYSICAL EXAM  GENERAL: NAD, well-developed  HEAD:  Atraumatic, Normocephalic  EYES: EOMI, PERRLA, conjunctiva and sclera clear  NECK: Supple, No JVD  CHEST/LUNG: Clear to auscultation bilaterally; No wheeze  HEART: Regular rate and rhythm; No murmurs, rubs, or gallops  ABDOMEN: Soft, Nontender, Nondistended; Bowel sounds present  EXTREMITIES:  2+ Peripheral Pulses, No clubbing, cyanosis, or edema    LABS:                        12.7   15.92 )-----------( 662      ( 21 Apr 2021 06:59 )             38.8     04-21    140  |  108<H>  |  26<H>  ----------------------------<  225<H>  3.9   |  24  |  0.87    Ca    8.7      21 Apr 2021 06:59    TPro  6.7  /  Alb  3.1<L>  /  TBili  0.5  /  DBili  <0.2  /  AST  17  /  ALT  19  /  AlkPhos  83  04-21              RADIOLOGY & ADDITIONAL TESTS:    Imaging Personally Reviewed:  Consultant(s) Notes Reviewed:    Care Discussed with Consultants/Other Providers:

## 2021-04-21 NOTE — CHART NOTE - NSCHARTNOTEFT_GEN_A_CORE
Patient c/o palpitations this morning. Described as "rapid heartbeat". Heart rate on bedside telemetry monitor low 120s. BP normotensive at the time. EKG at the time revealed Sinus tachycardia rate 117 bpm. TWI leads 3 and AVF. +0.5mm ST Depressions leads V3-V6. EKG similar to one on admissions besides T wave change and ST segment changes. Case d/w Cardiology, have viewed EKG. In the setting of COVID 19 AHRF on supplemental oxygen, and given no chest pain, agreeable with deferring checking troponin level as suspicion for acute cardiac ischemia is low. Patient c/o palpitations this morning. Described as "rapid heartbeat". Denies chest pain. Heart rate on bedside telemetry monitor low 120s. BP normotensive at the time. EKG at the time revealed Sinus tachycardia rate 117 bpm. TWI leads 3 and AVF. +0.5mm ST Depressions leads V3-V6. EKG similar to one on admissions besides T wave change and ST segment changes. Case d/w Cardiology, have viewed EKG. In the setting of COVID 19 AHRF on supplemental oxygen, and given no chest pain, agreeable with deferring checking troponin level as suspicion for acute cardiac ischemia is low.

## 2021-04-21 NOTE — PROGRESS NOTE ADULT - PROBLEM SELECTOR PLAN 1
- Acute hypoxemic respiratory failure 2/2 COVID 19 PNA   - continuous pulse oximetry, wean off supplemental oxygen as tolerated  - pt c/o palpitations earlier today, EKG shows subtle ST depressions in precordial leads, new from admission EKG   - will c/s Cards for possible ischemic w/u

## 2021-04-21 NOTE — PROGRESS NOTE ADULT - PROBLEM SELECTOR PLAN 2
- CT Angio chest w/ IV contrast-  no pulmonary embolus.  Extensive opacities are noted throughout both lungs. They're consistent with the patient's known history of Covid pneumonia.  - Monitor IM -> D-dimer significantly elevated, check b/l LE dopplers to r/o DVT - NEG  - C/w decadron, remdesivir completed  - supportive care with PRN cough medications, Tylenol, albuterol   - encourage incentive spirometer use

## 2021-04-22 LAB
ALBUMIN SERPL ELPH-MCNC: 2.9 G/DL — LOW (ref 3.3–5)
ALBUMIN SERPL ELPH-MCNC: 3.1 G/DL — LOW (ref 3.3–5)
ALP SERPL-CCNC: 82 U/L — SIGNIFICANT CHANGE UP (ref 40–120)
ALP SERPL-CCNC: 83 U/L — SIGNIFICANT CHANGE UP (ref 40–120)
ALT FLD-CCNC: 16 U/L — SIGNIFICANT CHANGE UP (ref 4–33)
ALT FLD-CCNC: 16 U/L — SIGNIFICANT CHANGE UP (ref 4–33)
ANION GAP SERPL CALC-SCNC: 10 MMOL/L — SIGNIFICANT CHANGE UP (ref 7–14)
AST SERPL-CCNC: 17 U/L — SIGNIFICANT CHANGE UP (ref 4–32)
AST SERPL-CCNC: 19 U/L — SIGNIFICANT CHANGE UP (ref 4–32)
BASOPHILS # BLD AUTO: 0.04 K/UL — SIGNIFICANT CHANGE UP (ref 0–0.2)
BASOPHILS NFR BLD AUTO: 0.3 % — SIGNIFICANT CHANGE UP (ref 0–2)
BILIRUB DIRECT SERPL-MCNC: <0.2 MG/DL — SIGNIFICANT CHANGE UP (ref 0–0.2)
BILIRUB INDIRECT FLD-MCNC: >0.2 MG/DL — SIGNIFICANT CHANGE UP (ref 0–1)
BILIRUB SERPL-MCNC: 0.4 MG/DL — SIGNIFICANT CHANGE UP (ref 0.2–1.2)
BILIRUB SERPL-MCNC: 0.4 MG/DL — SIGNIFICANT CHANGE UP (ref 0.2–1.2)
BUN SERPL-MCNC: 22 MG/DL — SIGNIFICANT CHANGE UP (ref 7–23)
CALCIUM SERPL-MCNC: 8.7 MG/DL — SIGNIFICANT CHANGE UP (ref 8.4–10.5)
CHLORIDE SERPL-SCNC: 104 MMOL/L — SIGNIFICANT CHANGE UP (ref 98–107)
CO2 SERPL-SCNC: 23 MMOL/L — SIGNIFICANT CHANGE UP (ref 22–31)
CREAT SERPL-MCNC: 0.88 MG/DL — SIGNIFICANT CHANGE UP (ref 0.5–1.3)
CREAT SERPL-MCNC: 0.92 MG/DL — SIGNIFICANT CHANGE UP (ref 0.5–1.3)
CRP SERPL-MCNC: 9.1 MG/L — HIGH
D DIMER BLD IA.RAPID-MCNC: 2371 NG/ML DDU — HIGH
EOSINOPHIL # BLD AUTO: 0.09 K/UL — SIGNIFICANT CHANGE UP (ref 0–0.5)
EOSINOPHIL NFR BLD AUTO: 0.6 % — SIGNIFICANT CHANGE UP (ref 0–6)
FERRITIN SERPL-MCNC: 493 NG/ML — HIGH (ref 15–150)
GLUCOSE SERPL-MCNC: 193 MG/DL — HIGH (ref 70–99)
HCT VFR BLD CALC: 39.2 % — SIGNIFICANT CHANGE UP (ref 34.5–45)
HGB BLD-MCNC: 13 G/DL — SIGNIFICANT CHANGE UP (ref 11.5–15.5)
IANC: 12.82 K/UL — HIGH (ref 1.5–8.5)
IMM GRANULOCYTES NFR BLD AUTO: 2.8 % — HIGH (ref 0–1.5)
LDH SERPL L TO P-CCNC: 574 U/L — HIGH (ref 135–225)
LYMPHOCYTES # BLD AUTO: 0.69 K/UL — LOW (ref 1–3.3)
LYMPHOCYTES # BLD AUTO: 4.7 % — LOW (ref 13–44)
MAGNESIUM SERPL-MCNC: 2.2 MG/DL — SIGNIFICANT CHANGE UP (ref 1.6–2.6)
MCHC RBC-ENTMCNC: 28 PG — SIGNIFICANT CHANGE UP (ref 27–34)
MCHC RBC-ENTMCNC: 33.2 GM/DL — SIGNIFICANT CHANGE UP (ref 32–36)
MCV RBC AUTO: 84.5 FL — SIGNIFICANT CHANGE UP (ref 80–100)
MONOCYTES # BLD AUTO: 0.75 K/UL — SIGNIFICANT CHANGE UP (ref 0–0.9)
MONOCYTES NFR BLD AUTO: 5.1 % — SIGNIFICANT CHANGE UP (ref 2–14)
NEUTROPHILS # BLD AUTO: 12.82 K/UL — HIGH (ref 1.8–7.4)
NEUTROPHILS NFR BLD AUTO: 86.5 % — HIGH (ref 43–77)
NRBC # BLD: 0 /100 WBCS — SIGNIFICANT CHANGE UP
NRBC # FLD: 0.02 K/UL — HIGH
PHOSPHATE SERPL-MCNC: 2.2 MG/DL — LOW (ref 2.5–4.5)
PLATELET # BLD AUTO: 589 K/UL — HIGH (ref 150–400)
POTASSIUM SERPL-MCNC: 4.2 MMOL/L — SIGNIFICANT CHANGE UP (ref 3.5–5.3)
POTASSIUM SERPL-SCNC: 4.2 MMOL/L — SIGNIFICANT CHANGE UP (ref 3.5–5.3)
PROCALCITONIN SERPL-MCNC: 0.05 NG/ML — SIGNIFICANT CHANGE UP (ref 0.02–0.1)
PROT SERPL-MCNC: 6.3 G/DL — SIGNIFICANT CHANGE UP (ref 6–8.3)
PROT SERPL-MCNC: 6.4 G/DL — SIGNIFICANT CHANGE UP (ref 6–8.3)
RBC # BLD: 4.64 M/UL — SIGNIFICANT CHANGE UP (ref 3.8–5.2)
RBC # FLD: 15.8 % — HIGH (ref 10.3–14.5)
SODIUM SERPL-SCNC: 137 MMOL/L — SIGNIFICANT CHANGE UP (ref 135–145)
WBC # BLD: 14.8 K/UL — HIGH (ref 3.8–10.5)
WBC # FLD AUTO: 14.8 K/UL — HIGH (ref 3.8–10.5)

## 2021-04-22 PROCEDURE — 99232 SBSQ HOSP IP/OBS MODERATE 35: CPT

## 2021-04-22 RX ORDER — SODIUM CHLORIDE 0.65 %
1 AEROSOL, SPRAY (ML) NASAL
Refills: 0 | Status: DISCONTINUED | OUTPATIENT
Start: 2021-04-22 | End: 2021-05-06

## 2021-04-22 RX ADMIN — Medication 6: at 13:29

## 2021-04-22 RX ADMIN — Medication 1 PUFF(S): at 09:46

## 2021-04-22 RX ADMIN — Medication 81 MILLIGRAM(S): at 12:57

## 2021-04-22 RX ADMIN — Medication 0.5 MILLIGRAM(S): at 16:06

## 2021-04-22 RX ADMIN — ALBUTEROL 2 PUFF(S): 90 AEROSOL, METERED ORAL at 09:46

## 2021-04-22 RX ADMIN — Medication 4: at 21:49

## 2021-04-22 RX ADMIN — Medication 7 UNIT(S): at 09:47

## 2021-04-22 RX ADMIN — Medication 6: at 18:25

## 2021-04-22 RX ADMIN — ALBUTEROL 2 PUFF(S): 90 AEROSOL, METERED ORAL at 17:58

## 2021-04-22 RX ADMIN — Medication 4: at 09:46

## 2021-04-22 RX ADMIN — Medication 100 MILLIGRAM(S): at 13:02

## 2021-04-22 RX ADMIN — SENNA PLUS 2 TABLET(S): 8.6 TABLET ORAL at 21:48

## 2021-04-22 RX ADMIN — ALBUTEROL 2 PUFF(S): 90 AEROSOL, METERED ORAL at 03:48

## 2021-04-22 RX ADMIN — ALBUTEROL 2 PUFF(S): 90 AEROSOL, METERED ORAL at 21:46

## 2021-04-22 RX ADMIN — Medication 1 PUFF(S): at 17:59

## 2021-04-22 RX ADMIN — PANTOPRAZOLE SODIUM 40 MILLIGRAM(S): 20 TABLET, DELAYED RELEASE ORAL at 06:03

## 2021-04-22 RX ADMIN — Medication 7 UNIT(S): at 13:30

## 2021-04-22 RX ADMIN — Medication 1 PUFF(S): at 21:47

## 2021-04-22 RX ADMIN — LOSARTAN POTASSIUM 100 MILLIGRAM(S): 100 TABLET, FILM COATED ORAL at 06:03

## 2021-04-22 RX ADMIN — Medication 10 MILLIGRAM(S): at 12:57

## 2021-04-22 RX ADMIN — Medication 6 MILLIGRAM(S): at 06:02

## 2021-04-22 RX ADMIN — Medication 7 UNIT(S): at 18:25

## 2021-04-22 RX ADMIN — INSULIN GLARGINE 20 UNIT(S): 100 INJECTION, SOLUTION SUBCUTANEOUS at 21:48

## 2021-04-22 RX ADMIN — ENOXAPARIN SODIUM 40 MILLIGRAM(S): 100 INJECTION SUBCUTANEOUS at 12:57

## 2021-04-22 RX ADMIN — Medication 1 PUFF(S): at 03:46

## 2021-04-22 NOTE — PROGRESS NOTE ADULT - SUBJECTIVE AND OBJECTIVE BOX
Patient is a 60y old  Female who presents with a chief complaint of shortness of breath (21 Apr 2021 14:45)        SUBJECTIVE / OVERNIGHT EVENTS:  no acute events o/n  pt denies complaints  no tachypnea  sating low 90s on 4L at rest      MEDICATIONS  (STANDING):  ALBUTerol    90 MICROgram(s) HFA Inhaler 1 Puff(s) Inhalation every 4 hours  ALBUTerol    90 MICROgram(s) HFA Inhaler 2 Puff(s) Inhalation every 6 hours  aspirin enteric coated 81 milliGRAM(s) Oral daily  dextrose 40% Gel 15 Gram(s) Oral once  dextrose 5%. 1000 milliLiter(s) (50 mL/Hr) IV Continuous <Continuous>  dextrose 5%. 1000 milliLiter(s) (100 mL/Hr) IV Continuous <Continuous>  dextrose 50% Injectable 25 Gram(s) IV Push once  dextrose 50% Injectable 12.5 Gram(s) IV Push once  dextrose 50% Injectable 25 Gram(s) IV Push once  enoxaparin Injectable 40 milliGRAM(s) SubCutaneous daily  glucagon  Injectable 1 milliGRAM(s) IntraMuscular once  insulin glargine Injectable (LANTUS) 20 Unit(s) SubCutaneous at bedtime  insulin lispro (ADMELOG) corrective regimen sliding scale   SubCutaneous three times a day before meals  insulin lispro (ADMELOG) corrective regimen sliding scale   SubCutaneous at bedtime  insulin lispro Injectable (ADMELOG) 7 Unit(s) SubCutaneous three times a day before meals  ipratropium 17 MICROgram(s) HFA Inhaler 1 Puff(s) Inhalation every 6 hours  losartan 100 milliGRAM(s) Oral daily  pantoprazole    Tablet 40 milliGRAM(s) Oral before breakfast  PARoxetine 10 milliGRAM(s) Oral daily  polyethylene glycol 3350 17 Gram(s) Oral daily  senna 2 Tablet(s) Oral at bedtime  tiotropium 18 MICROgram(s) Capsule 1 Capsule(s) Inhalation daily    MEDICATIONS  (PRN):  acetaminophen   Tablet .. 650 milliGRAM(s) Oral every 6 hours PRN Temp greater or equal to 38C (100.4F), Mild Pain (1 - 3), Moderate Pain (4 - 6)  aluminum hydroxide/magnesium hydroxide/simethicone Suspension 30 milliLiter(s) Oral every 4 hours PRN Dyspepsia  benzonatate 100 milliGRAM(s) Oral every 8 hours PRN Cough  LORazepam   Injectable 0.5 milliGRAM(s) IV Push every 6 hours PRN Anxiety      Vital Signs Last 24 Hrs  T(C): 37.3 (22 Apr 2021 09:59), Max: 37.3 (22 Apr 2021 09:59)  T(F): 99.2 (22 Apr 2021 09:59), Max: 99.2 (22 Apr 2021 09:59)  HR: 109 (22 Apr 2021 09:59) (101 - 125)  BP: 137/80 (22 Apr 2021 09:59) (128/70 - 137/80)  BP(mean): --  RR: 19 (22 Apr 2021 09:59) (19 - 21)  SpO2: 94% (22 Apr 2021 09:59) (88% - 98%)  CAPILLARY BLOOD GLUCOSE      POCT Blood Glucose.: 266 mg/dL (22 Apr 2021 13:23)  POCT Blood Glucose.: 212 mg/dL (22 Apr 2021 09:24)  POCT Blood Glucose.: 286 mg/dL (21 Apr 2021 21:04)  POCT Blood Glucose.: 209 mg/dL (21 Apr 2021 18:02)    I&O's Summary    21 Apr 2021 07:01  -  22 Apr 2021 07:00  --------------------------------------------------------  IN: 400 mL / OUT: 250 mL / NET: 150 mL          PHYSICAL EXAM  GENERAL: NAD, well-developed  HEAD:  Atraumatic, Normocephalic  EYES: EOMI, PERRLA, conjunctiva and sclera clear  NECK: Supple, No JVD  CHEST/LUNG: coarse bs b/l   HEART: Regular rate and rhythm; No murmurs, rubs, or gallops  ABDOMEN: Soft, Nontender, Nondistended; Bowel sounds present  EXTREMITIES:  2+ Peripheral Pulses, No clubbing, cyanosis, or edema      LABS:                        13.0   14.80 )-----------( 589      ( 22 Apr 2021 06:50 )             39.2     04-22    137  |  104  |  22  ----------------------------<  193<H>  4.2   |  23  |  0.88    Ca    8.7      22 Apr 2021 06:50  Phos  2.2     04-22  Mg     2.2     04-22    TPro  6.3  /  Alb  2.9<L>  /  TBili  0.4  /  DBili  <0.2  /  AST  19  /  ALT  16  /  AlkPhos  82  04-22              RADIOLOGY & ADDITIONAL TESTS:    Imaging Personally Reviewed:  Consultant(s) Notes Reviewed:    Care Discussed with Consultants/Other Providers:

## 2021-04-22 NOTE — PROGRESS NOTE ADULT - PROBLEM SELECTOR PLAN 1
- Acute hypoxemic respiratory failure 2/2 COVID 19 PNA   - continuous pulse oximetry, wean off supplemental oxygen as tolerated  - still requiring 4L NC currently

## 2021-04-22 NOTE — CHART NOTE - NSCHARTNOTEFT_GEN_A_CORE
Contacted by RN, advising patient feels anxious and has palpitations, "similar to the previous episodes of palpitations' EKG ordered. Nurse advising patient refusing EKG and additional work up. will monitor. Jose L Marinelli PA-C

## 2021-04-23 LAB
ALBUMIN SERPL ELPH-MCNC: 3 G/DL — LOW (ref 3.3–5)
ALP SERPL-CCNC: 85 U/L — SIGNIFICANT CHANGE UP (ref 40–120)
ALT FLD-CCNC: 18 U/L — SIGNIFICANT CHANGE UP (ref 4–33)
ANION GAP SERPL CALC-SCNC: 13 MMOL/L — SIGNIFICANT CHANGE UP (ref 7–14)
AST SERPL-CCNC: 16 U/L — SIGNIFICANT CHANGE UP (ref 4–32)
BILIRUB SERPL-MCNC: 0.4 MG/DL — SIGNIFICANT CHANGE UP (ref 0.2–1.2)
BUN SERPL-MCNC: 18 MG/DL — SIGNIFICANT CHANGE UP (ref 7–23)
CALCIUM SERPL-MCNC: 8.8 MG/DL — SIGNIFICANT CHANGE UP (ref 8.4–10.5)
CHLORIDE SERPL-SCNC: 103 MMOL/L — SIGNIFICANT CHANGE UP (ref 98–107)
CO2 SERPL-SCNC: 22 MMOL/L — SIGNIFICANT CHANGE UP (ref 22–31)
CREAT SERPL-MCNC: 0.77 MG/DL — SIGNIFICANT CHANGE UP (ref 0.5–1.3)
GLUCOSE SERPL-MCNC: 190 MG/DL — HIGH (ref 70–99)
HCT VFR BLD CALC: 38.1 % — SIGNIFICANT CHANGE UP (ref 34.5–45)
HGB BLD-MCNC: 12.3 G/DL — SIGNIFICANT CHANGE UP (ref 11.5–15.5)
MAGNESIUM SERPL-MCNC: 2 MG/DL — SIGNIFICANT CHANGE UP (ref 1.6–2.6)
MCHC RBC-ENTMCNC: 27.6 PG — SIGNIFICANT CHANGE UP (ref 27–34)
MCHC RBC-ENTMCNC: 32.3 GM/DL — SIGNIFICANT CHANGE UP (ref 32–36)
MCV RBC AUTO: 85.6 FL — SIGNIFICANT CHANGE UP (ref 80–100)
NRBC # BLD: 0 /100 WBCS — SIGNIFICANT CHANGE UP
NRBC # FLD: 0 K/UL — SIGNIFICANT CHANGE UP
PHOSPHATE SERPL-MCNC: 1.8 MG/DL — LOW (ref 2.5–4.5)
PLATELET # BLD AUTO: 640 K/UL — HIGH (ref 150–400)
POTASSIUM SERPL-MCNC: 4 MMOL/L — SIGNIFICANT CHANGE UP (ref 3.5–5.3)
POTASSIUM SERPL-SCNC: 4 MMOL/L — SIGNIFICANT CHANGE UP (ref 3.5–5.3)
PROT SERPL-MCNC: 6.4 G/DL — SIGNIFICANT CHANGE UP (ref 6–8.3)
RBC # BLD: 4.45 M/UL — SIGNIFICANT CHANGE UP (ref 3.8–5.2)
RBC # FLD: 15.8 % — HIGH (ref 10.3–14.5)
SODIUM SERPL-SCNC: 138 MMOL/L — SIGNIFICANT CHANGE UP (ref 135–145)
WBC # BLD: 19.32 K/UL — HIGH (ref 3.8–10.5)
WBC # FLD AUTO: 19.32 K/UL — HIGH (ref 3.8–10.5)

## 2021-04-23 PROCEDURE — 99233 SBSQ HOSP IP/OBS HIGH 50: CPT

## 2021-04-23 RX ORDER — POTASSIUM PHOSPHATE, MONOBASIC POTASSIUM PHOSPHATE, DIBASIC 236; 224 MG/ML; MG/ML
15 INJECTION, SOLUTION INTRAVENOUS ONCE
Refills: 0 | Status: COMPLETED | OUTPATIENT
Start: 2021-04-23 | End: 2021-04-23

## 2021-04-23 RX ORDER — SODIUM CHLORIDE 9 MG/ML
1000 INJECTION INTRAMUSCULAR; INTRAVENOUS; SUBCUTANEOUS ONCE
Refills: 0 | Status: COMPLETED | OUTPATIENT
Start: 2021-04-23 | End: 2021-04-23

## 2021-04-23 RX ADMIN — Medication 2: at 18:00

## 2021-04-23 RX ADMIN — Medication 650 MILLIGRAM(S): at 19:55

## 2021-04-23 RX ADMIN — Medication 1 PUFF(S): at 10:01

## 2021-04-23 RX ADMIN — SODIUM CHLORIDE 500 MILLILITER(S): 9 INJECTION INTRAMUSCULAR; INTRAVENOUS; SUBCUTANEOUS at 14:12

## 2021-04-23 RX ADMIN — Medication 2: at 09:58

## 2021-04-23 RX ADMIN — Medication 10 MILLIGRAM(S): at 11:54

## 2021-04-23 RX ADMIN — LOSARTAN POTASSIUM 100 MILLIGRAM(S): 100 TABLET, FILM COATED ORAL at 05:38

## 2021-04-23 RX ADMIN — ALBUTEROL 2 PUFF(S): 90 AEROSOL, METERED ORAL at 15:46

## 2021-04-23 RX ADMIN — Medication 81 MILLIGRAM(S): at 11:54

## 2021-04-23 RX ADMIN — Medication 7 UNIT(S): at 13:40

## 2021-04-23 RX ADMIN — ENOXAPARIN SODIUM 40 MILLIGRAM(S): 100 INJECTION SUBCUTANEOUS at 11:54

## 2021-04-23 RX ADMIN — INSULIN GLARGINE 20 UNIT(S): 100 INJECTION, SOLUTION SUBCUTANEOUS at 21:50

## 2021-04-23 RX ADMIN — Medication 1 PUFF(S): at 03:38

## 2021-04-23 RX ADMIN — ALBUTEROL 2 PUFF(S): 90 AEROSOL, METERED ORAL at 10:01

## 2021-04-23 RX ADMIN — Medication 7 UNIT(S): at 18:00

## 2021-04-23 RX ADMIN — Medication 650 MILLIGRAM(S): at 12:30

## 2021-04-23 RX ADMIN — Medication 650 MILLIGRAM(S): at 21:16

## 2021-04-23 RX ADMIN — Medication 30 MILLILITER(S): at 17:43

## 2021-04-23 RX ADMIN — Medication 0.5 MILLIGRAM(S): at 21:32

## 2021-04-23 RX ADMIN — ALBUTEROL 2 PUFF(S): 90 AEROSOL, METERED ORAL at 03:35

## 2021-04-23 RX ADMIN — Medication 650 MILLIGRAM(S): at 12:00

## 2021-04-23 RX ADMIN — PANTOPRAZOLE SODIUM 40 MILLIGRAM(S): 20 TABLET, DELAYED RELEASE ORAL at 05:38

## 2021-04-23 RX ADMIN — POTASSIUM PHOSPHATE, MONOBASIC POTASSIUM PHOSPHATE, DIBASIC 62.5 MILLIMOLE(S): 236; 224 INJECTION, SOLUTION INTRAVENOUS at 11:43

## 2021-04-23 RX ADMIN — Medication 1 PUFF(S): at 15:46

## 2021-04-23 RX ADMIN — Medication 7 UNIT(S): at 09:59

## 2021-04-23 RX ADMIN — Medication 2: at 13:39

## 2021-04-23 NOTE — DIETITIAN INITIAL EVALUATION ADULT. - PERTINENT LABORATORY DATA
(4/23) Na 138, BUN 18, Cr 0.77, <H>, K+ 4.0, Phos 1.8<L>, Mg 2.0, Alk Phos 85, ALT/SGPT 18, AST/SGOT 16. (4/14) HbA1c 7.8%<H>. POCT: (4/23) 152, (4/22) 212-325.

## 2021-04-23 NOTE — PROGRESS NOTE ADULT - PROBLEM SELECTOR PLAN 1
- Acute hypoxemic respiratory failure 2/2 COVID 19 PNA   - continuous pulse oximetry, wean off supplemental oxygen as tolerated  - still requiring 4L NC currently, Increased HR with palpitations  -home o2 delivered

## 2021-04-23 NOTE — PROGRESS NOTE ADULT - SUBJECTIVE AND OBJECTIVE BOX
Medicine Progress Note    Patient is a 60y old  Female who presents with a chief complaint of shortness of breath (22 Apr 2021 14:27)      SUBJECTIVE / OVERNIGHT EVENTS: patient still remains tachycardic and reports palpitations    ADDITIONAL REVIEW OF SYSTEMS:    MEDICATIONS  (STANDING):  ALBUTerol    90 MICROgram(s) HFA Inhaler 1 Puff(s) Inhalation every 4 hours  ALBUTerol    90 MICROgram(s) HFA Inhaler 2 Puff(s) Inhalation every 6 hours  aspirin enteric coated 81 milliGRAM(s) Oral daily  dextrose 40% Gel 15 Gram(s) Oral once  dextrose 5%. 1000 milliLiter(s) (50 mL/Hr) IV Continuous <Continuous>  dextrose 5%. 1000 milliLiter(s) (100 mL/Hr) IV Continuous <Continuous>  dextrose 50% Injectable 25 Gram(s) IV Push once  dextrose 50% Injectable 12.5 Gram(s) IV Push once  dextrose 50% Injectable 25 Gram(s) IV Push once  enoxaparin Injectable 40 milliGRAM(s) SubCutaneous daily  glucagon  Injectable 1 milliGRAM(s) IntraMuscular once  insulin glargine Injectable (LANTUS) 20 Unit(s) SubCutaneous at bedtime  insulin lispro (ADMELOG) corrective regimen sliding scale   SubCutaneous three times a day before meals  insulin lispro (ADMELOG) corrective regimen sliding scale   SubCutaneous at bedtime  insulin lispro Injectable (ADMELOG) 7 Unit(s) SubCutaneous three times a day before meals  ipratropium 17 MICROgram(s) HFA Inhaler 1 Puff(s) Inhalation every 6 hours  losartan 100 milliGRAM(s) Oral daily  pantoprazole    Tablet 40 milliGRAM(s) Oral before breakfast  PARoxetine 10 milliGRAM(s) Oral daily  polyethylene glycol 3350 17 Gram(s) Oral daily  senna 2 Tablet(s) Oral at bedtime  tiotropium 18 MICROgram(s) Capsule 1 Capsule(s) Inhalation daily    MEDICATIONS  (PRN):  acetaminophen   Tablet .. 650 milliGRAM(s) Oral every 6 hours PRN Temp greater or equal to 38C (100.4F), Mild Pain (1 - 3), Moderate Pain (4 - 6)  aluminum hydroxide/magnesium hydroxide/simethicone Suspension 30 milliLiter(s) Oral every 4 hours PRN Dyspepsia  benzonatate 100 milliGRAM(s) Oral every 8 hours PRN Cough  LORazepam   Injectable 0.5 milliGRAM(s) IV Push every 6 hours PRN Anxiety  sodium chloride 0.65% Nasal 1 Spray(s) Both Nostrils two times a day PRN Congestion    CAPILLARY BLOOD GLUCOSE      POCT Blood Glucose.: 152 mg/dL (23 Apr 2021 09:38)  POCT Blood Glucose.: 325 mg/dL (22 Apr 2021 21:12)  POCT Blood Glucose.: 287 mg/dL (22 Apr 2021 18:11)  POCT Blood Glucose.: 266 mg/dL (22 Apr 2021 13:23)    I&O's Summary    22 Apr 2021 07:01  -  23 Apr 2021 07:00  --------------------------------------------------------  IN: 250 mL / OUT: 715 mL / NET: -465 mL    23 Apr 2021 07:01  -  23 Apr 2021 11:55  --------------------------------------------------------  IN: 0 mL / OUT: 0 mL / NET: 0 mL        PHYSICAL EXAM:  Vital Signs Last 24 Hrs  T(C): 37 (23 Apr 2021 08:34), Max: 37.4 (22 Apr 2021 16:08)  T(F): 98.6 (23 Apr 2021 08:34), Max: 99.4 (22 Apr 2021 16:08)  HR: 108 (23 Apr 2021 08:34) (106 - 125)  BP: 122/86 (23 Apr 2021 08:34) (122/86 - 128/84)  BP(mean): --  RR: 19 (23 Apr 2021 08:34) (18 - 20)  SpO2: 97% (23 Apr 2021 08:34) (94% - 97%)  CONSTITUTIONAL: NAD, well-developed, well-groomed  ENMT: Moist oral mucosa, no pharyngeal injection or exudates; normal dentition  RESPIRATORY: Normal respiratory effort; lungs are clear to auscultation bilaterally  CARDIOVASCULAR: tachycardic, +s1s2, no ANSELMO  ABDOMEN: Nontender to palpation, normoactive bowel sounds, no rebound/guarding; No hepatosplenomegaly  PSYCH: A+O to person, place, and time; affect appropriate  NEUROLOGY: CN 2-12 are intact and symmetric; no gross sensory deficits   SKIN: No rashes; no palpable lesions    LABS:                        12.3   19.32 )-----------( 640      ( 23 Apr 2021 07:06 )             38.1     04-23    138  |  103  |  18  ----------------------------<  190<H>  4.0   |  22  |  0.77    Ca    8.8      23 Apr 2021 07:06  Phos  1.8     04-23  Mg     2.0     04-23    TPro  6.4  /  Alb  3.0<L>  /  TBili  0.4  /  DBili  x   /  AST  16  /  ALT  18  /  AlkPhos  85  04-23              COVID-19 PCR: Detected (13 Apr 2021 10:15)      RADIOLOGY & ADDITIONAL TESTS:  Imaging from Last 24 Hours:    Electrocardiogram/QTc Interval:    COORDINATION OF CARE:  Care Discussed with Consultants/Other Providers:

## 2021-04-23 NOTE — DIETITIAN INITIAL EVALUATION ADULT. - OTHER INFO
BGs currently managed with Lantus 20U/Admelog 7U TID/ISS and therapeutic diet.   Nutrition education deferred in setting of acute illness, anxiety; RDN remains available to provide as appropriate.  On NC, s/p remdesivir/dexamethasone.   No noted GI distress, last BM 4/21 per flowsheets, +bowel regimen (senna, polyethylene glycol).  Dosing weight (4/13) 145 lbs. Weight history, per chart: (12/7/20) 153 lbs, (6/30/20) 150 lbs. BGs currently managed with Lantus 20U/Admelog 7U TID/ISS and therapeutic diet.   Nutrition education deferred in setting of acute illness, anxiety; RDN remains available to provide as appropriate.  On RA, s/p remdesivir/dexamethasone.   No noted GI distress, last BM 4/21 per flowsheets, +bowel regimen (senna, polyethylene glycol).  Dosing weight (4/13) 145 lbs. Weight history, per chart: (12/7/20) 153 lbs, (6/30/20) 150 lbs.

## 2021-04-23 NOTE — DIETITIAN INITIAL EVALUATION ADULT. - DIET TYPE
1) Recommend liberalized diet- Consistent Carbohydrate with evening snack- to maximize menu item availability; may d/c DASH/TLC restriction.

## 2021-04-23 NOTE — PROGRESS NOTE ADULT - PROBLEM SELECTOR PLAN 2
- CT Angio chest w/ IV contrast-  no pulmonary embolus.  Extensive opacities are noted throughout both lungs. They're consistent with the patient's known history of Covid pneumonia.  - Monitor IM -> D-dimer significantly elevated, check b/l LE dopplers to r/o DVT - NEG  - decadron, remdesivir completed  - supportive care with PRN cough medications, Tylenol, albuterol   - encourage incentive spirometer use  -will need o/p CROWN support

## 2021-04-23 NOTE — DIETITIAN INITIAL EVALUATION ADULT. - ORAL NUTRITION SUPPLEMENTS
2) Consider addition of Glucerna 1 PO daily (provides 220 kcal, 10 gm protein per 8oz serving) to assist pt in meeting estimated nutrition needs.

## 2021-04-23 NOTE — DIETITIAN INITIAL EVALUATION ADULT. - PERTINENT MEDS FT
LANTUS 20 Unit(s) at bedtime, ADMELOG corrective regimen sliding scale, ADMELOG 7 Unit(s) TID, pantoprazole Tablet, polyethylene glycol 3350, senna, aluminum hydroxide/magnesium hydroxide/simethicone Suspension PRN

## 2021-04-23 NOTE — DIETITIAN INITIAL EVALUATION ADULT. - ADD RECOMMEND
3) Monitor PO intake, weight trends, nutrition related lab values, BMs/GI distress, hydration status, skin integrity.

## 2021-04-23 NOTE — DIETITIAN INITIAL EVALUATION ADULT. - REASON FOR ADMISSION
Per chart, pt is 60 year old female wPMHx DM, HTN, depression admitted for acute respiratory failure with hypoxia and sepsis secondary to COVID-19.

## 2021-04-24 LAB
ANION GAP SERPL CALC-SCNC: 12 MMOL/L — SIGNIFICANT CHANGE UP (ref 7–14)
BUN SERPL-MCNC: 5 MG/DL — LOW (ref 7–23)
CALCIUM SERPL-MCNC: 8.6 MG/DL — SIGNIFICANT CHANGE UP (ref 8.4–10.5)
CHLORIDE SERPL-SCNC: 106 MMOL/L — SIGNIFICANT CHANGE UP (ref 98–107)
CO2 SERPL-SCNC: 21 MMOL/L — LOW (ref 22–31)
CREAT SERPL-MCNC: 0.69 MG/DL — SIGNIFICANT CHANGE UP (ref 0.5–1.3)
D DIMER BLD IA.RAPID-MCNC: 1636 NG/ML DDU — HIGH
GLUCOSE SERPL-MCNC: 276 MG/DL — HIGH (ref 70–99)
HCT VFR BLD CALC: 37.5 % — SIGNIFICANT CHANGE UP (ref 34.5–45)
HGB BLD-MCNC: 12.2 G/DL — SIGNIFICANT CHANGE UP (ref 11.5–15.5)
MAGNESIUM SERPL-MCNC: 2.1 MG/DL — SIGNIFICANT CHANGE UP (ref 1.6–2.6)
MCHC RBC-ENTMCNC: 27.9 PG — SIGNIFICANT CHANGE UP (ref 27–34)
MCHC RBC-ENTMCNC: 32.5 GM/DL — SIGNIFICANT CHANGE UP (ref 32–36)
MCV RBC AUTO: 85.8 FL — SIGNIFICANT CHANGE UP (ref 80–100)
NRBC # BLD: 0 /100 WBCS — SIGNIFICANT CHANGE UP
NRBC # FLD: 0 K/UL — SIGNIFICANT CHANGE UP
PHOSPHATE SERPL-MCNC: 1.5 MG/DL — LOW (ref 2.5–4.5)
PLATELET # BLD AUTO: 505 K/UL — HIGH (ref 150–400)
POTASSIUM SERPL-MCNC: 3.9 MMOL/L — SIGNIFICANT CHANGE UP (ref 3.5–5.3)
POTASSIUM SERPL-SCNC: 3.9 MMOL/L — SIGNIFICANT CHANGE UP (ref 3.5–5.3)
PROCALCITONIN SERPL-MCNC: 0.12 NG/ML — HIGH (ref 0.02–0.1)
RBC # BLD: 4.37 M/UL — SIGNIFICANT CHANGE UP (ref 3.8–5.2)
RBC # FLD: 15.8 % — HIGH (ref 10.3–14.5)
SODIUM SERPL-SCNC: 139 MMOL/L — SIGNIFICANT CHANGE UP (ref 135–145)
WBC # BLD: 15.83 K/UL — HIGH (ref 3.8–10.5)
WBC # FLD AUTO: 15.83 K/UL — HIGH (ref 3.8–10.5)

## 2021-04-24 PROCEDURE — 99233 SBSQ HOSP IP/OBS HIGH 50: CPT

## 2021-04-24 RX ORDER — SODIUM CHLORIDE 9 MG/ML
1000 INJECTION INTRAMUSCULAR; INTRAVENOUS; SUBCUTANEOUS
Refills: 0 | Status: DISCONTINUED | OUTPATIENT
Start: 2021-04-24 | End: 2021-04-27

## 2021-04-24 RX ORDER — POTASSIUM PHOSPHATE, MONOBASIC POTASSIUM PHOSPHATE, DIBASIC 236; 224 MG/ML; MG/ML
15 INJECTION, SOLUTION INTRAVENOUS ONCE
Refills: 0 | Status: COMPLETED | OUTPATIENT
Start: 2021-04-24 | End: 2021-04-24

## 2021-04-24 RX ADMIN — PANTOPRAZOLE SODIUM 40 MILLIGRAM(S): 20 TABLET, DELAYED RELEASE ORAL at 07:22

## 2021-04-24 RX ADMIN — Medication 6: at 09:58

## 2021-04-24 RX ADMIN — Medication 10 MILLIGRAM(S): at 11:37

## 2021-04-24 RX ADMIN — Medication 650 MILLIGRAM(S): at 22:31

## 2021-04-24 RX ADMIN — LOSARTAN POTASSIUM 100 MILLIGRAM(S): 100 TABLET, FILM COATED ORAL at 05:55

## 2021-04-24 RX ADMIN — Medication 7 UNIT(S): at 13:34

## 2021-04-24 RX ADMIN — Medication 81 MILLIGRAM(S): at 11:37

## 2021-04-24 RX ADMIN — SODIUM CHLORIDE 75 MILLILITER(S): 9 INJECTION INTRAMUSCULAR; INTRAVENOUS; SUBCUTANEOUS at 19:55

## 2021-04-24 RX ADMIN — Medication 0.5 MILLIGRAM(S): at 19:55

## 2021-04-24 RX ADMIN — ALBUTEROL 2 PUFF(S): 90 AEROSOL, METERED ORAL at 11:47

## 2021-04-24 RX ADMIN — Medication 1 PUFF(S): at 22:28

## 2021-04-24 RX ADMIN — POTASSIUM PHOSPHATE, MONOBASIC POTASSIUM PHOSPHATE, DIBASIC 62.5 MILLIMOLE(S): 236; 224 INJECTION, SOLUTION INTRAVENOUS at 10:16

## 2021-04-24 RX ADMIN — INSULIN GLARGINE 20 UNIT(S): 100 INJECTION, SOLUTION SUBCUTANEOUS at 22:03

## 2021-04-24 RX ADMIN — ENOXAPARIN SODIUM 40 MILLIGRAM(S): 100 INJECTION SUBCUTANEOUS at 11:37

## 2021-04-24 RX ADMIN — Medication 7 UNIT(S): at 09:58

## 2021-04-24 RX ADMIN — Medication 2: at 22:03

## 2021-04-24 NOTE — PROGRESS NOTE ADULT - PROBLEM SELECTOR PLAN 1
- Acute hypoxemic respiratory failure 2/2 COVID 19 PNA   - continuous pulse oximetry, wean off supplemental oxygen as tolerated  - still requiring 4L NC currently, Increased HR with palpitations  -home o2 delivered  -check ambulatory pox and HR

## 2021-04-24 NOTE — PROGRESS NOTE ADULT - SUBJECTIVE AND OBJECTIVE BOX
Medicine Progress Note    Patient is a 60y old  Female who presents with a chief complaint of Per chart, pt is 60 year old female wPMHx DM, HTN, depression admitted for acute respiratory failure with hypoxia and sepsis secondary to COVID-19. (23 Apr 2021 16:42)      SUBJECTIVE / OVERNIGHT EVENTS: pt still tachycardic at rest and requiring oxygen    ADDITIONAL REVIEW OF SYSTEMS:    MEDICATIONS  (STANDING):  ALBUTerol    90 MICROgram(s) HFA Inhaler 1 Puff(s) Inhalation every 4 hours  ALBUTerol    90 MICROgram(s) HFA Inhaler 2 Puff(s) Inhalation every 6 hours  aspirin enteric coated 81 milliGRAM(s) Oral daily  dextrose 40% Gel 15 Gram(s) Oral once  dextrose 5%. 1000 milliLiter(s) (50 mL/Hr) IV Continuous <Continuous>  dextrose 5%. 1000 milliLiter(s) (100 mL/Hr) IV Continuous <Continuous>  dextrose 50% Injectable 25 Gram(s) IV Push once  dextrose 50% Injectable 12.5 Gram(s) IV Push once  dextrose 50% Injectable 25 Gram(s) IV Push once  enoxaparin Injectable 40 milliGRAM(s) SubCutaneous daily  glucagon  Injectable 1 milliGRAM(s) IntraMuscular once  insulin glargine Injectable (LANTUS) 20 Unit(s) SubCutaneous at bedtime  insulin lispro (ADMELOG) corrective regimen sliding scale   SubCutaneous three times a day before meals  insulin lispro (ADMELOG) corrective regimen sliding scale   SubCutaneous at bedtime  insulin lispro Injectable (ADMELOG) 7 Unit(s) SubCutaneous three times a day before meals  ipratropium 17 MICROgram(s) HFA Inhaler 1 Puff(s) Inhalation every 6 hours  losartan 100 milliGRAM(s) Oral daily  pantoprazole    Tablet 40 milliGRAM(s) Oral before breakfast  PARoxetine 10 milliGRAM(s) Oral daily  polyethylene glycol 3350 17 Gram(s) Oral daily  senna 2 Tablet(s) Oral at bedtime  tiotropium 18 MICROgram(s) Capsule 1 Capsule(s) Inhalation daily    MEDICATIONS  (PRN):  acetaminophen   Tablet .. 650 milliGRAM(s) Oral every 6 hours PRN Temp greater or equal to 38C (100.4F), Mild Pain (1 - 3), Moderate Pain (4 - 6)  aluminum hydroxide/magnesium hydroxide/simethicone Suspension 30 milliLiter(s) Oral every 4 hours PRN Dyspepsia  benzonatate 100 milliGRAM(s) Oral every 8 hours PRN Cough  LORazepam   Injectable 0.5 milliGRAM(s) IV Push every 6 hours PRN Anxiety  sodium chloride 0.65% Nasal 1 Spray(s) Both Nostrils two times a day PRN Congestion    CAPILLARY BLOOD GLUCOSE      POCT Blood Glucose.: 257 mg/dL (24 Apr 2021 09:38)  POCT Blood Glucose.: 199 mg/dL (23 Apr 2021 21:41)  POCT Blood Glucose.: 151 mg/dL (23 Apr 2021 17:56)  POCT Blood Glucose.: 152 mg/dL (23 Apr 2021 13:19)    I&O's Summary    23 Apr 2021 07:01  -  24 Apr 2021 07:00  --------------------------------------------------------  IN: 600 mL / OUT: 1450 mL / NET: -850 mL        PHYSICAL EXAM:  Vital Signs Last 24 Hrs  T(C): 36.8 (24 Apr 2021 05:52), Max: 38.3 (23 Apr 2021 19:54)  T(F): 98.3 (24 Apr 2021 05:52), Max: 100.9 (23 Apr 2021 19:54)  HR: 129 (24 Apr 2021 05:52) (122 - 144)  BP: 134/87 (24 Apr 2021 05:52) (121/73 - 143/77)  BP(mean): --  RR: 18 (24 Apr 2021 05:52) (18 - 20)  SpO2: 93% (24 Apr 2021 05:52) (87% - 99%)  CONSTITUTIONAL: NAD, well-developed, well-groomed  ENMT: Moist oral mucosa, no pharyngeal injection or exudates; normal dentition  RESPIRATORY: Normal respiratory effort; lungs are clear to auscultation bilaterally  CARDIOVASCULAR: tachycardic +s1s2  ABDOMEN: Nontender to palpation, normoactive bowel sounds, no rebound/guarding; No hepatosplenomegaly  PSYCH: A+O to person, place, and time; affect appropriate  NEUROLOGY: CN 2-12 are intact and symmetric; no gross sensory deficits   SKIN: No rashes; no palpable lesions    LABS:                        12.2   15.83 )-----------( 505      ( 24 Apr 2021 07:35 )             37.5     04-24    139  |  106  |  5<L>  ----------------------------<  276<H>  3.9   |  21<L>  |  0.69    Ca    8.6      24 Apr 2021 07:35  Phos  1.5     04-24  Mg     2.1     04-24    TPro  6.4  /  Alb  3.0<L>  /  TBili  0.4  /  DBili  x   /  AST  16  /  ALT  18  /  AlkPhos  85  04-23              COVID-19 PCR: Detected (13 Apr 2021 10:15)      RADIOLOGY & ADDITIONAL TESTS:  Imaging from Last 24 Hours:    Electrocardiogram/QTc Interval:    COORDINATION OF CARE:  Care Discussed with Consultants/Other Providers:

## 2021-04-25 LAB
ALBUMIN SERPL ELPH-MCNC: 2.6 G/DL — LOW (ref 3.3–5)
ALP SERPL-CCNC: 73 U/L — SIGNIFICANT CHANGE UP (ref 40–120)
ALT FLD-CCNC: 18 U/L — SIGNIFICANT CHANGE UP (ref 4–33)
ANION GAP SERPL CALC-SCNC: 8 MMOL/L — SIGNIFICANT CHANGE UP (ref 7–14)
AST SERPL-CCNC: 19 U/L — SIGNIFICANT CHANGE UP (ref 4–32)
BILIRUB DIRECT SERPL-MCNC: <0.2 MG/DL — SIGNIFICANT CHANGE UP (ref 0–0.2)
BILIRUB INDIRECT FLD-MCNC: >0.2 MG/DL — SIGNIFICANT CHANGE UP (ref 0–1)
BILIRUB SERPL-MCNC: 0.4 MG/DL — SIGNIFICANT CHANGE UP (ref 0.2–1.2)
BUN SERPL-MCNC: 11 MG/DL — SIGNIFICANT CHANGE UP (ref 7–23)
CALCIUM SERPL-MCNC: 8.5 MG/DL — SIGNIFICANT CHANGE UP (ref 8.4–10.5)
CHLORIDE SERPL-SCNC: 106 MMOL/L — SIGNIFICANT CHANGE UP (ref 98–107)
CO2 SERPL-SCNC: 24 MMOL/L — SIGNIFICANT CHANGE UP (ref 22–31)
CREAT SERPL-MCNC: 0.68 MG/DL — SIGNIFICANT CHANGE UP (ref 0.5–1.3)
CRP SERPL-MCNC: 106.9 MG/L — HIGH
D DIMER BLD IA.RAPID-MCNC: 1040 NG/ML DDU — HIGH
FERRITIN SERPL-MCNC: 450 NG/ML — HIGH (ref 15–150)
GLUCOSE SERPL-MCNC: 130 MG/DL — HIGH (ref 70–99)
HCT VFR BLD CALC: 34.6 % — SIGNIFICANT CHANGE UP (ref 34.5–45)
HGB BLD-MCNC: 11.3 G/DL — LOW (ref 11.5–15.5)
LDH SERPL L TO P-CCNC: 302 U/L — HIGH (ref 135–225)
MAGNESIUM SERPL-MCNC: 2 MG/DL — SIGNIFICANT CHANGE UP (ref 1.6–2.6)
MCHC RBC-ENTMCNC: 27.7 PG — SIGNIFICANT CHANGE UP (ref 27–34)
MCHC RBC-ENTMCNC: 32.7 GM/DL — SIGNIFICANT CHANGE UP (ref 32–36)
MCV RBC AUTO: 84.8 FL — SIGNIFICANT CHANGE UP (ref 80–100)
NRBC # BLD: 0 /100 WBCS — SIGNIFICANT CHANGE UP
NRBC # FLD: 0 K/UL — SIGNIFICANT CHANGE UP
PHOSPHATE SERPL-MCNC: 2.3 MG/DL — LOW (ref 2.5–4.5)
PLATELET # BLD AUTO: 454 K/UL — HIGH (ref 150–400)
POTASSIUM SERPL-MCNC: 3.9 MMOL/L — SIGNIFICANT CHANGE UP (ref 3.5–5.3)
POTASSIUM SERPL-SCNC: 3.9 MMOL/L — SIGNIFICANT CHANGE UP (ref 3.5–5.3)
PROT SERPL-MCNC: 6.3 G/DL — SIGNIFICANT CHANGE UP (ref 6–8.3)
RBC # BLD: 4.08 M/UL — SIGNIFICANT CHANGE UP (ref 3.8–5.2)
RBC # FLD: 15.5 % — HIGH (ref 10.3–14.5)
SODIUM SERPL-SCNC: 138 MMOL/L — SIGNIFICANT CHANGE UP (ref 135–145)
WBC # BLD: 14.63 K/UL — HIGH (ref 3.8–10.5)
WBC # FLD AUTO: 14.63 K/UL — HIGH (ref 3.8–10.5)

## 2021-04-25 PROCEDURE — 99233 SBSQ HOSP IP/OBS HIGH 50: CPT

## 2021-04-25 RX ORDER — INSULIN LISPRO 100/ML
5 VIAL (ML) SUBCUTANEOUS
Refills: 0 | Status: DISCONTINUED | OUTPATIENT
Start: 2021-04-25 | End: 2021-04-28

## 2021-04-25 RX ORDER — INSULIN GLARGINE 100 [IU]/ML
15 INJECTION, SOLUTION SUBCUTANEOUS AT BEDTIME
Refills: 0 | Status: DISCONTINUED | OUTPATIENT
Start: 2021-04-25 | End: 2021-04-28

## 2021-04-25 RX ADMIN — Medication 1 PUFF(S): at 09:41

## 2021-04-25 RX ADMIN — LOSARTAN POTASSIUM 100 MILLIGRAM(S): 100 TABLET, FILM COATED ORAL at 07:08

## 2021-04-25 RX ADMIN — Medication 1 PUFF(S): at 05:00

## 2021-04-25 RX ADMIN — ENOXAPARIN SODIUM 40 MILLIGRAM(S): 100 INJECTION SUBCUTANEOUS at 13:03

## 2021-04-25 RX ADMIN — Medication 7 UNIT(S): at 09:40

## 2021-04-25 RX ADMIN — Medication 1 PUFF(S): at 22:53

## 2021-04-25 RX ADMIN — Medication 650 MILLIGRAM(S): at 17:00

## 2021-04-25 RX ADMIN — Medication 650 MILLIGRAM(S): at 15:59

## 2021-04-25 RX ADMIN — Medication 1 PUFF(S): at 17:59

## 2021-04-25 RX ADMIN — ALBUTEROL 2 PUFF(S): 90 AEROSOL, METERED ORAL at 09:40

## 2021-04-25 RX ADMIN — Medication 650 MILLIGRAM(S): at 01:21

## 2021-04-25 RX ADMIN — PANTOPRAZOLE SODIUM 40 MILLIGRAM(S): 20 TABLET, DELAYED RELEASE ORAL at 07:08

## 2021-04-25 RX ADMIN — Medication 81 MILLIGRAM(S): at 13:03

## 2021-04-25 RX ADMIN — ALBUTEROL 2 PUFF(S): 90 AEROSOL, METERED ORAL at 22:53

## 2021-04-25 RX ADMIN — ALBUTEROL 2 PUFF(S): 90 AEROSOL, METERED ORAL at 17:59

## 2021-04-25 RX ADMIN — INSULIN GLARGINE 15 UNIT(S): 100 INJECTION, SOLUTION SUBCUTANEOUS at 22:52

## 2021-04-25 RX ADMIN — Medication 10 MILLIGRAM(S): at 13:03

## 2021-04-25 RX ADMIN — SODIUM CHLORIDE 75 MILLILITER(S): 9 INJECTION INTRAMUSCULAR; INTRAVENOUS; SUBCUTANEOUS at 09:33

## 2021-04-25 NOTE — PROVIDER CONTACT NOTE (HYPOGLYCEMIA EVENT) - NS PROVIDER CONTACT BACKGROUND-HYPO
Age: 60y    Gender: Female    POCT Blood Glucose:  54 mg/dL (04-25-21 @ 12:19)  109 mg/dL (04-25-21 @ 09:23)  287 mg/dL (04-24-21 @ 21:29)  76 mg/dL (04-24-21 @ 18:30)  140 mg/dL (04-24-21 @ 13:28)      eMAR:  insulin glargine Injectable (LANTUS)   20 Unit(s) SubCutaneous (04-24-21 @ 22:03)    insulin lispro (ADMELOG) corrective regimen sliding scale   2  SubCutaneous (04-24-21 @ 22:03)    insulin lispro Injectable (ADMELOG)   7 Unit(s) SubCutaneous (04-25-21 @ 09:40)   7 Unit(s) SubCutaneous (04-24-21 @ 13:34)

## 2021-04-25 NOTE — PROVIDER CONTACT NOTE (HYPOGLYCEMIA EVENT) - NS PROVIDER CONTACT SITUATION-HYPO
Pt hypoglycemic after 7 units standing order.   Hx of DM 2.  Pt awake and alert, asymptomatic BGS 55. c/o headache   x2 4 oz concentrated apple juice given

## 2021-04-25 NOTE — CHART NOTE - NSCHARTNOTEFT_GEN_A_CORE
Notified by RN that pt fell while using the commode. Pt was seen at bedside. Pt states she lost balance and landed on her buttocks when she trying to go on the commode. Denies LOC, hitting her head, headache, dizziness, chest pain, SOB, changes in vision. Pt is currently resting and denies any pain. VSS.    T(C): 36.9 (04-25-21 @ 04:20), Max: 38.3 (04-24-21 @ 22:19)  HR: 119 (04-25-21 @ 04:20) (119 - 138)  BP: 137/83 (04-25-21 @ 04:20) (125/63 - 137/83)  RR: 19 (04-25-21 @ 04:20) (18 - 19)  SpO2: 96% (04-25-21 @ 04:20) (93% - 96%)    Constitutional: NAD, well-developed, well-nourished  Ears, Nose, Mouth, and Throat: normal external ears and nose, normal hearing, moist oral mucosa  Respiratory: Clear to auscultation bilaterally. No wheezes, rales or rhonchi. Normal respiratory effort  Cardiovascular: regular rate and rhythm, S1 and S2, no murmurs, rubs or gallops, no edema, 2+ peripheral pulses  Skin: warm, dry, no rash  Musculoskeletal: no clubbing, no cyanosis, no joint swelling    Plan: Pt informed to use call bell when getting up to use the commode.

## 2021-04-25 NOTE — PROVIDER CONTACT NOTE (FALL NOTIFICATION) - ASSESSMENT
Pt. fell while attempting to go on the commode.  Pt. found on her buttocks. Pt. denies hitting her head. Pt. denies any pain to anywhere on body.  Pt. vitals are stable

## 2021-04-25 NOTE — PROGRESS NOTE ADULT - SUBJECTIVE AND OBJECTIVE BOX
Medicine Progress Note    Patient is a 60y old  Female who presents with a chief complaint of shortness of breath (24 Apr 2021 12:27)      SUBJECTIVE / OVERNIGHT EVENTS: pt appears more lethargic, remains tachycardic    ADDITIONAL REVIEW OF SYSTEMS:    MEDICATIONS  (STANDING):  ALBUTerol    90 MICROgram(s) HFA Inhaler 1 Puff(s) Inhalation every 4 hours  ALBUTerol    90 MICROgram(s) HFA Inhaler 2 Puff(s) Inhalation every 6 hours  aspirin enteric coated 81 milliGRAM(s) Oral daily  dextrose 40% Gel 15 Gram(s) Oral once  dextrose 5%. 1000 milliLiter(s) (50 mL/Hr) IV Continuous <Continuous>  dextrose 5%. 1000 milliLiter(s) (100 mL/Hr) IV Continuous <Continuous>  dextrose 50% Injectable 25 Gram(s) IV Push once  dextrose 50% Injectable 12.5 Gram(s) IV Push once  dextrose 50% Injectable 25 Gram(s) IV Push once  enoxaparin Injectable 40 milliGRAM(s) SubCutaneous daily  glucagon  Injectable 1 milliGRAM(s) IntraMuscular once  insulin glargine Injectable (LANTUS) 15 Unit(s) SubCutaneous at bedtime  insulin lispro (ADMELOG) corrective regimen sliding scale   SubCutaneous three times a day before meals  insulin lispro (ADMELOG) corrective regimen sliding scale   SubCutaneous at bedtime  insulin lispro Injectable (ADMELOG) 5 Unit(s) SubCutaneous three times a day before meals  ipratropium 17 MICROgram(s) HFA Inhaler 1 Puff(s) Inhalation every 6 hours  losartan 100 milliGRAM(s) Oral daily  pantoprazole    Tablet 40 milliGRAM(s) Oral before breakfast  PARoxetine 10 milliGRAM(s) Oral daily  polyethylene glycol 3350 17 Gram(s) Oral daily  senna 2 Tablet(s) Oral at bedtime  sodium chloride 0.9%. 1000 milliLiter(s) (75 mL/Hr) IV Continuous <Continuous>  tiotropium 18 MICROgram(s) Capsule 1 Capsule(s) Inhalation daily    MEDICATIONS  (PRN):  acetaminophen   Tablet .. 650 milliGRAM(s) Oral every 6 hours PRN Temp greater or equal to 38C (100.4F), Mild Pain (1 - 3), Moderate Pain (4 - 6)  aluminum hydroxide/magnesium hydroxide/simethicone Suspension 30 milliLiter(s) Oral every 4 hours PRN Dyspepsia  benzonatate 100 milliGRAM(s) Oral every 8 hours PRN Cough  LORazepam   Injectable 0.5 milliGRAM(s) IV Push every 6 hours PRN Anxiety  sodium chloride 0.65% Nasal 1 Spray(s) Both Nostrils two times a day PRN Congestion    CAPILLARY BLOOD GLUCOSE      POCT Blood Glucose.: 210 mg/dL (25 Apr 2021 22:14)  POCT Blood Glucose.: 117 mg/dL (25 Apr 2021 18:16)  POCT Blood Glucose.: 203 mg/dL (25 Apr 2021 13:41)  POCT Blood Glucose.: 119 mg/dL (25 Apr 2021 13:06)  POCT Blood Glucose.: 54 mg/dL (25 Apr 2021 12:19)  POCT Blood Glucose.: 109 mg/dL (25 Apr 2021 09:23)    I&O's Summary      PHYSICAL EXAM:  Vital Signs Last 24 Hrs  T(C): 36.9 (26 Apr 2021 06:50), Max: 38.1 (26 Apr 2021 05:56)  T(F): 98.4 (26 Apr 2021 06:50), Max: 100.6 (26 Apr 2021 05:56)  HR: 120 (26 Apr 2021 06:02) (120 - 139)  BP: 139/84 (26 Apr 2021 06:02) (133/79 - 139/84)  BP(mean): --  RR: 20 (26 Apr 2021 09:03) (20 - 20)  SpO2: 95% (26 Apr 2021 09:03) (86% - 97%)  CONSTITUTIONAL: NAD, well-developed, well-groomed  ENMT: Moist oral mucosa, no pharyngeal injection or exudates; normal dentition  RESPIRATORY: Normal respiratory effort; lungs are clear to auscultation bilaterally  CARDIOVASCULAR: tachycardic, regular rate and rhythm, normal S1 and S2, no murmur/rub/gallop; No lower extremity edema; Peripheral pulses are 2+ bilaterally  ABDOMEN: Nontender to palpation, normoactive bowel sounds, no rebound/guarding; No hepatosplenomegaly  PSYCH: A+O to person, place, and time; affect appropriate  NEUROLOGY: CN 2-12 are intact and symmetric; no gross sensory deficits   SKIN: No rashes; no palpable lesions    LABS:                        11.3   14.63 )-----------( 454      ( 25 Apr 2021 06:54 )             34.6     04-25    138  |  106  |  11  ----------------------------<  130<H>  3.9   |  24  |  0.68    Ca    8.5      25 Apr 2021 06:54  Phos  2.3     04-25  Mg     2.0     04-25    TPro  6.3  /  Alb  2.6<L>  /  TBili  0.4  /  DBili  <0.2  /  AST  19  /  ALT  18  /  AlkPhos  73  04-25              Culture - Blood (collected 24 Apr 2021 01:56)  Source: .Blood Blood-Venous  Preliminary Report (25 Apr 2021 02:01):    No growth to date.    Culture - Blood (collected 24 Apr 2021 01:56)  Source: .Blood Blood-Peripheral  Preliminary Report (25 Apr 2021 02:01):    No growth to date.      COVID-19 PCR: Detected (13 Apr 2021 10:15)      RADIOLOGY & ADDITIONAL TESTS:  Imaging from Last 24 Hours:    Electrocardiogram/QTc Interval:    COORDINATION OF CARE:  Care Discussed with Consultants/Other Providers:

## 2021-04-25 NOTE — PROGRESS NOTE ADULT - PROBLEM SELECTOR PLAN 2
- CT Angio chest w/ IV contrast-  no pulmonary embolus.  Extensive opacities are noted throughout both lungs. They're consistent with the patient's known history of Covid pneumonia.  - Monitor IM -> D-dimer significantly elevated, check b/l LE dopplers to r/o DVT - NEG  - decadron, remdesivir completed  - supportive care with PRN cough medications, Tylenol, albuterol   - encourage incentive spirometer use  -will need o/p CROWN support  -PT to eval, remains deconditioned and weak

## 2021-04-25 NOTE — PROGRESS NOTE ADULT - PROBLEM SELECTOR PLAN 1
- Acute hypoxemic respiratory failure 2/2 COVID 19 PNA   - continuous pulse oximetry, wean off supplemental oxygen as tolerated  - still requiring 4L NC currently, Increased HR with palpitations  -home o2 delivered  -remains tachycardic and febrile

## 2021-04-26 LAB
APPEARANCE UR: CLEAR — SIGNIFICANT CHANGE UP
BILIRUB UR-MCNC: NEGATIVE — SIGNIFICANT CHANGE UP
COLOR SPEC: SIGNIFICANT CHANGE UP
DIFF PNL FLD: NEGATIVE — SIGNIFICANT CHANGE UP
GLUCOSE UR QL: NEGATIVE — SIGNIFICANT CHANGE UP
KETONES UR-MCNC: NEGATIVE — SIGNIFICANT CHANGE UP
LEUKOCYTE ESTERASE UR-ACNC: NEGATIVE — SIGNIFICANT CHANGE UP
NITRITE UR-MCNC: NEGATIVE — SIGNIFICANT CHANGE UP
PH UR: 6.5 — SIGNIFICANT CHANGE UP (ref 5–8)
PROT UR-MCNC: ABNORMAL
SP GR SPEC: 1.01 — SIGNIFICANT CHANGE UP (ref 1.01–1.02)
UROBILINOGEN FLD QL: SIGNIFICANT CHANGE UP

## 2021-04-26 PROCEDURE — 71045 X-RAY EXAM CHEST 1 VIEW: CPT | Mod: 26

## 2021-04-26 PROCEDURE — 99233 SBSQ HOSP IP/OBS HIGH 50: CPT

## 2021-04-26 RX ADMIN — SENNA PLUS 2 TABLET(S): 8.6 TABLET ORAL at 21:23

## 2021-04-26 RX ADMIN — PANTOPRAZOLE SODIUM 40 MILLIGRAM(S): 20 TABLET, DELAYED RELEASE ORAL at 06:03

## 2021-04-26 RX ADMIN — ENOXAPARIN SODIUM 40 MILLIGRAM(S): 100 INJECTION SUBCUTANEOUS at 12:42

## 2021-04-26 RX ADMIN — LOSARTAN POTASSIUM 100 MILLIGRAM(S): 100 TABLET, FILM COATED ORAL at 06:03

## 2021-04-26 RX ADMIN — Medication 650 MILLIGRAM(S): at 12:50

## 2021-04-26 RX ADMIN — Medication 1 PUFF(S): at 12:42

## 2021-04-26 RX ADMIN — Medication 1 PUFF(S): at 21:30

## 2021-04-26 RX ADMIN — Medication 650 MILLIGRAM(S): at 06:50

## 2021-04-26 RX ADMIN — Medication 5 UNIT(S): at 10:09

## 2021-04-26 RX ADMIN — ALBUTEROL 2 PUFF(S): 90 AEROSOL, METERED ORAL at 21:30

## 2021-04-26 RX ADMIN — Medication 81 MILLIGRAM(S): at 12:42

## 2021-04-26 RX ADMIN — SODIUM CHLORIDE 75 MILLILITER(S): 9 INJECTION INTRAMUSCULAR; INTRAVENOUS; SUBCUTANEOUS at 00:29

## 2021-04-26 RX ADMIN — Medication 650 MILLIGRAM(S): at 21:23

## 2021-04-26 RX ADMIN — INSULIN GLARGINE 15 UNIT(S): 100 INJECTION, SOLUTION SUBCUTANEOUS at 21:22

## 2021-04-26 RX ADMIN — ALBUTEROL 2 PUFF(S): 90 AEROSOL, METERED ORAL at 06:06

## 2021-04-26 RX ADMIN — SODIUM CHLORIDE 75 MILLILITER(S): 9 INJECTION INTRAMUSCULAR; INTRAVENOUS; SUBCUTANEOUS at 08:05

## 2021-04-26 RX ADMIN — Medication 1 PUFF(S): at 06:06

## 2021-04-26 RX ADMIN — Medication 5 UNIT(S): at 18:19

## 2021-04-26 RX ADMIN — Medication 650 MILLIGRAM(S): at 05:57

## 2021-04-26 RX ADMIN — Medication 1 PUFF(S): at 17:41

## 2021-04-26 RX ADMIN — Medication 10 MILLIGRAM(S): at 12:42

## 2021-04-26 RX ADMIN — ALBUTEROL 2 PUFF(S): 90 AEROSOL, METERED ORAL at 12:42

## 2021-04-26 RX ADMIN — POLYETHYLENE GLYCOL 3350 17 GRAM(S): 17 POWDER, FOR SOLUTION ORAL at 12:42

## 2021-04-26 RX ADMIN — Medication 5 UNIT(S): at 13:35

## 2021-04-26 RX ADMIN — Medication 2: at 10:09

## 2021-04-26 RX ADMIN — ALBUTEROL 2 PUFF(S): 90 AEROSOL, METERED ORAL at 17:41

## 2021-04-26 NOTE — PROGRESS NOTE ADULT - PROBLEM SELECTOR PLAN 3
-A1c 7.8  -Hold home oral hypoglycemic agents ( Metformin)  -Monitor blood sugars qAC and qHS  -Lispro low insulin sliding scale  -DASH and Consistent carbohydrate diet  -decreased insulin as was hypoglycemic yesterday

## 2021-04-26 NOTE — PROGRESS NOTE ADULT - SUBJECTIVE AND OBJECTIVE BOX
Medicine Progress Note    Patient is a 60y old  Female who presents with a chief complaint of shortness of breath (25 Apr 2021 09:10)      SUBJECTIVE / OVERNIGHT EVENTS: remains weak, febrile overnight    ADDITIONAL REVIEW OF SYSTEMS:    MEDICATIONS  (STANDING):  ALBUTerol    90 MICROgram(s) HFA Inhaler 1 Puff(s) Inhalation every 4 hours  ALBUTerol    90 MICROgram(s) HFA Inhaler 2 Puff(s) Inhalation every 6 hours  aspirin enteric coated 81 milliGRAM(s) Oral daily  dextrose 40% Gel 15 Gram(s) Oral once  dextrose 5%. 1000 milliLiter(s) (50 mL/Hr) IV Continuous <Continuous>  dextrose 5%. 1000 milliLiter(s) (100 mL/Hr) IV Continuous <Continuous>  dextrose 50% Injectable 25 Gram(s) IV Push once  dextrose 50% Injectable 12.5 Gram(s) IV Push once  dextrose 50% Injectable 25 Gram(s) IV Push once  enoxaparin Injectable 40 milliGRAM(s) SubCutaneous daily  glucagon  Injectable 1 milliGRAM(s) IntraMuscular once  insulin glargine Injectable (LANTUS) 15 Unit(s) SubCutaneous at bedtime  insulin lispro (ADMELOG) corrective regimen sliding scale   SubCutaneous three times a day before meals  insulin lispro (ADMELOG) corrective regimen sliding scale   SubCutaneous at bedtime  insulin lispro Injectable (ADMELOG) 5 Unit(s) SubCutaneous three times a day before meals  ipratropium 17 MICROgram(s) HFA Inhaler 1 Puff(s) Inhalation every 6 hours  losartan 100 milliGRAM(s) Oral daily  PARoxetine 10 milliGRAM(s) Oral daily  polyethylene glycol 3350 17 Gram(s) Oral daily  senna 2 Tablet(s) Oral at bedtime  sodium chloride 0.9%. 1000 milliLiter(s) (75 mL/Hr) IV Continuous <Continuous>  tiotropium 18 MICROgram(s) Capsule 1 Capsule(s) Inhalation daily    MEDICATIONS  (PRN):  acetaminophen   Tablet .. 650 milliGRAM(s) Oral every 6 hours PRN Temp greater or equal to 38C (100.4F), Mild Pain (1 - 3), Moderate Pain (4 - 6)  aluminum hydroxide/magnesium hydroxide/simethicone Suspension 30 milliLiter(s) Oral every 4 hours PRN Dyspepsia  benzonatate 100 milliGRAM(s) Oral every 8 hours PRN Cough  LORazepam   Injectable 0.5 milliGRAM(s) IV Push every 6 hours PRN Anxiety  sodium chloride 0.65% Nasal 1 Spray(s) Both Nostrils two times a day PRN Congestion    CAPILLARY BLOOD GLUCOSE      POCT Blood Glucose.: 154 mg/dL (26 Apr 2021 09:31)  POCT Blood Glucose.: 210 mg/dL (25 Apr 2021 22:14)  POCT Blood Glucose.: 117 mg/dL (25 Apr 2021 18:16)  POCT Blood Glucose.: 203 mg/dL (25 Apr 2021 13:41)  POCT Blood Glucose.: 119 mg/dL (25 Apr 2021 13:06)    I&O's Summary      PHYSICAL EXAM:  Vital Signs Last 24 Hrs  T(C): 36.8 (26 Apr 2021 11:51), Max: 38.1 (26 Apr 2021 05:56)  T(F): 98.3 (26 Apr 2021 11:51), Max: 100.6 (26 Apr 2021 05:56)  HR: 125 (26 Apr 2021 11:51) (120 - 139)  BP: 142/84 (26 Apr 2021 11:51) (133/79 - 142/84)  BP(mean): --  RR: 20 (26 Apr 2021 11:51) (20 - 20)  SpO2: 95% (26 Apr 2021 11:51) (86% - 97%)  CONSTITUTIONAL: NAD, well-developed, well-groomed  ENMT: Moist oral mucosa, no pharyngeal injection or exudates; normal dentition  RESPIRATORY: Normal respiratory effort; lungs are clear to auscultation bilaterally  CARDIOVASCULAR: tachycardic, regular rhythm  ABDOMEN: Nontender to palpation, normoactive bowel sounds, no rebound/guarding; No hepatosplenomegaly  PSYCH: A+O to person, place, and time; affect appropriate  NEUROLOGY: CN 2-12 are intact and symmetric; no gross sensory deficits   SKIN: No rashes; no palpable lesions    LABS:                        11.3   14.63 )-----------( 454      ( 25 Apr 2021 06:54 )             34.6     04-25    138  |  106  |  11  ----------------------------<  130<H>  3.9   |  24  |  0.68    Ca    8.5      25 Apr 2021 06:54  Phos  2.3     04-25  Mg     2.0     04-25    TPro  6.3  /  Alb  2.6<L>  /  TBili  0.4  /  DBili  <0.2  /  AST  19  /  ALT  18  /  AlkPhos  73  04-25              Culture - Blood (collected 24 Apr 2021 01:56)  Source: .Blood Blood-Venous  Preliminary Report (25 Apr 2021 02:01):    No growth to date.    Culture - Blood (collected 24 Apr 2021 01:56)  Source: .Blood Blood-Peripheral  Preliminary Report (25 Apr 2021 02:01):    No growth to date.      COVID-19 PCR: Detected (13 Apr 2021 10:15)      RADIOLOGY & ADDITIONAL TESTS:  Imaging from Last 24 Hours:    Electrocardiogram/QTc Interval:    COORDINATION OF CARE:  Care Discussed with Consultants/Other Providers:

## 2021-04-26 NOTE — PROGRESS NOTE ADULT - PROBLEM SELECTOR PLAN 1
- Acute hypoxemic respiratory failure 2/2 COVID 19 PNA   - continuous pulse oximetry, wean off supplemental oxygen as tolerated  - still requiring 3L NC currently, Increased HR with palpitations  -home o2 delivered  -remains tachycardic and febrile, will ask ID to re-assess

## 2021-04-26 NOTE — PROGRESS NOTE ADULT - PROBLEM SELECTOR PLAN 2
- CT Angio chest w/ IV contrast-  no pulmonary embolus.  Extensive opacities are noted throughout both lungs. They're consistent with the patient's known history of Covid pneumonia.  - Monitor IM -> D-dimer significantly elevated, check b/l LE dopplers to r/o DVT - NEG  - decadron, remdesivir completed  - supportive care with PRN cough medications, Tylenol, albuterol   - encourage incentive spirometer use  -will need o/p CROWN support  -PT to eval, remains deconditioned and weak, PT recommends home PT

## 2021-04-27 LAB
ANION GAP SERPL CALC-SCNC: 12 MMOL/L — SIGNIFICANT CHANGE UP (ref 7–14)
BUN SERPL-MCNC: 9 MG/DL — SIGNIFICANT CHANGE UP (ref 7–23)
CALCIUM SERPL-MCNC: 9 MG/DL — SIGNIFICANT CHANGE UP (ref 8.4–10.5)
CHLORIDE SERPL-SCNC: 104 MMOL/L — SIGNIFICANT CHANGE UP (ref 98–107)
CO2 SERPL-SCNC: 25 MMOL/L — SIGNIFICANT CHANGE UP (ref 22–31)
CREAT SERPL-MCNC: 0.68 MG/DL — SIGNIFICANT CHANGE UP (ref 0.5–1.3)
GLUCOSE SERPL-MCNC: 152 MG/DL — HIGH (ref 70–99)
HCT VFR BLD CALC: 35.2 % — SIGNIFICANT CHANGE UP (ref 34.5–45)
HGB BLD-MCNC: 11.6 G/DL — SIGNIFICANT CHANGE UP (ref 11.5–15.5)
MCHC RBC-ENTMCNC: 27.5 PG — SIGNIFICANT CHANGE UP (ref 27–34)
MCHC RBC-ENTMCNC: 33 GM/DL — SIGNIFICANT CHANGE UP (ref 32–36)
MCV RBC AUTO: 83.4 FL — SIGNIFICANT CHANGE UP (ref 80–100)
NRBC # BLD: 0 /100 WBCS — SIGNIFICANT CHANGE UP
NRBC # FLD: 0 K/UL — SIGNIFICANT CHANGE UP
PLATELET # BLD AUTO: 465 K/UL — HIGH (ref 150–400)
POTASSIUM SERPL-MCNC: 3.7 MMOL/L — SIGNIFICANT CHANGE UP (ref 3.5–5.3)
POTASSIUM SERPL-SCNC: 3.7 MMOL/L — SIGNIFICANT CHANGE UP (ref 3.5–5.3)
RBC # BLD: 4.22 M/UL — SIGNIFICANT CHANGE UP (ref 3.8–5.2)
RBC # FLD: 15.6 % — HIGH (ref 10.3–14.5)
SODIUM SERPL-SCNC: 141 MMOL/L — SIGNIFICANT CHANGE UP (ref 135–145)
WBC # BLD: 11.29 K/UL — HIGH (ref 3.8–10.5)
WBC # FLD AUTO: 11.29 K/UL — HIGH (ref 3.8–10.5)

## 2021-04-27 PROCEDURE — 99233 SBSQ HOSP IP/OBS HIGH 50: CPT

## 2021-04-27 PROCEDURE — 71275 CT ANGIOGRAPHY CHEST: CPT | Mod: 26

## 2021-04-27 PROCEDURE — 99254 IP/OBS CNSLTJ NEW/EST MOD 60: CPT | Mod: GC

## 2021-04-27 RX ORDER — PIPERACILLIN AND TAZOBACTAM 4; .5 G/20ML; G/20ML
3.38 INJECTION, POWDER, LYOPHILIZED, FOR SOLUTION INTRAVENOUS EVERY 8 HOURS
Refills: 0 | Status: DISCONTINUED | OUTPATIENT
Start: 2021-04-27 | End: 2021-04-29

## 2021-04-27 RX ORDER — PIPERACILLIN AND TAZOBACTAM 4; .5 G/20ML; G/20ML
3.38 INJECTION, POWDER, LYOPHILIZED, FOR SOLUTION INTRAVENOUS ONCE
Refills: 0 | Status: COMPLETED | OUTPATIENT
Start: 2021-04-27 | End: 2021-04-27

## 2021-04-27 RX ADMIN — LOSARTAN POTASSIUM 100 MILLIGRAM(S): 100 TABLET, FILM COATED ORAL at 05:33

## 2021-04-27 RX ADMIN — Medication 81 MILLIGRAM(S): at 13:51

## 2021-04-27 RX ADMIN — Medication 10 MILLIGRAM(S): at 13:51

## 2021-04-27 RX ADMIN — Medication 1 PUFF(S): at 05:33

## 2021-04-27 RX ADMIN — INSULIN GLARGINE 15 UNIT(S): 100 INJECTION, SOLUTION SUBCUTANEOUS at 22:02

## 2021-04-27 RX ADMIN — ENOXAPARIN SODIUM 40 MILLIGRAM(S): 100 INJECTION SUBCUTANEOUS at 13:51

## 2021-04-27 RX ADMIN — Medication 5 UNIT(S): at 16:18

## 2021-04-27 RX ADMIN — Medication 650 MILLIGRAM(S): at 07:18

## 2021-04-27 RX ADMIN — Medication 650 MILLIGRAM(S): at 16:18

## 2021-04-27 RX ADMIN — ALBUTEROL 2 PUFF(S): 90 AEROSOL, METERED ORAL at 05:33

## 2021-04-27 RX ADMIN — Medication 5 UNIT(S): at 09:31

## 2021-04-27 RX ADMIN — POLYETHYLENE GLYCOL 3350 17 GRAM(S): 17 POWDER, FOR SOLUTION ORAL at 13:52

## 2021-04-27 RX ADMIN — Medication 2: at 18:13

## 2021-04-27 RX ADMIN — Medication 650 MILLIGRAM(S): at 02:07

## 2021-04-27 RX ADMIN — Medication 650 MILLIGRAM(S): at 16:44

## 2021-04-27 RX ADMIN — PIPERACILLIN AND TAZOBACTAM 25 GRAM(S): 4; .5 INJECTION, POWDER, LYOPHILIZED, FOR SOLUTION INTRAVENOUS at 22:02

## 2021-04-27 RX ADMIN — Medication 650 MILLIGRAM(S): at 03:43

## 2021-04-27 RX ADMIN — PIPERACILLIN AND TAZOBACTAM 200 GRAM(S): 4; .5 INJECTION, POWDER, LYOPHILIZED, FOR SOLUTION INTRAVENOUS at 13:51

## 2021-04-27 NOTE — CONSULT NOTE ADULT - ASSESSMENT
60 year old female with PMhx of T2DM (on Metformin), HTN and depression presents with worsening shortness of breath and fatigue.  Admitted on 4/13 with COVID, known tested positive 4 days before admission.   Currently on NC 2L, did not change much since admission.  s/p remdesivir 4/13-17  s/p Dexamethasone 4/13-22  Spiking fever since 4/23-26.  ID consulted for fever.    D-dimer trending down.  CRP trending up from 9.1 to 106.9    #Fever:  -  60 year old female with PMhx of T2DM (on Metformin), HTN and depression presents with worsening shortness of breath and fatigue.  Admitted on 4/13 with COVID, known tested positive 4 days before admission.   Currently on NC 2L, did not change much since admission.  s/p remdesivir 4/13-17  s/p Dexamethasone 4/13-22  Spiking fever since 4/23-26.  ID consulted for fever.    D-dimer trending down.  CRP trending up from 9.1 to 106.9    #Fever:  - No focal symptoms, she is not coughing a lot either  - Given the tachycardia, reasonable to obtain CT with PE protocol    #Keflex allergy:  - Pt reported to have IV infiltration from Keflex, however keflex is always a PO abx, and IV infiltration should not list as allergy  - ok to give cephalosporine if needed 60 year old female with PMhx of T2DM (on Metformin), HTN and depression presents with worsening shortness of breath and fatigue.  Admitted on 4/13 with COVID, known tested positive 4 days before admission.   Currently on NC 2L, did not change much since admission.  s/p remdesivir 4/13-17  s/p Dexamethasone 4/13-22  Spiking fever since 4/23-26.  ID consulted for fever.    D-dimer trending down.  CRP trending up from 9.1 to 106.9  UA negative    #Fever:  - No focal symptoms, she is not coughing a lot either  - Given the tachycardia, reasonable to obtain CT with PE protocol  - Follow up BCx  - Start Zosyn 3.375g q8h    #Keflex allergy:  - Pt reported to have IV infiltration from Keflex, however keflex is always a PO abx, and IV infiltration should not list as allergy  - She tolerated amoxicillin  - ok to give cephalosporine if needed    Yasmani Ray MD, PGY4   ID fellow  Pager: [phone]  After 5pm/weekends call [phone]    d/w Dr. Roxanna Thapa conveyed to primary team [id number] 60 year old female with PMhx of T2DM (on Metformin), HTN and depression presents with worsening shortness of breath and fatigue.  Admitted on 4/13 with COVID, known tested positive 4 days before admission.   Currently on NC 2L, did not change much since admission.  s/p remdesivir 4/13-17  s/p Dexamethasone 4/13-22  Spiking fever since 4/23-26.  ID consulted for fever.    D-dimer trending down.  CRP trending up from 9.1 to 106.9  UA negative    #Fever:  - No focal symptoms, she is not coughing a lot either  - Given the tachycardia, reasonable to obtain CT with PE protocol  - Follow up BCx  - Start Zosyn 3.375g q8h    #Keflex allergy:  - She tolerated amoxicillin  monitor for allergy    Yasmani Ray MD, PGY4   ID fellow  Pager: [phone]  After 5pm/weekends call [phone]    d/w Dr. Roxanna Thapa conveyed to primary team [id number]

## 2021-04-27 NOTE — CONSULT NOTE ADULT - ATTENDING COMMENTS
Patient seen and examined  Above verified  Agree with above unless as noted below.  60 year old female with PMhx of T2DM (on Metformin), HTN and depression presents with worsening shortness of breath and fatigue.  Admitted on 4/13 with COVID, known tested positive 4 days before admission.   Currently on NC 2L, did not change much since admission.  s/p remdesivir 4/13-17  s/p Dexamethasone 4/13-22  Spiking fever since 4/23-26.  ID consulted for fever.    Minimal cough  Exam with crackle/wheezes  no other localization  ? COVID related  ? Nosocomial infection  ? PE  Rec:  1) check Cx  2) CT r/o PE  3) Monitor clinically  4) Empiric zosyn for now    Will tailor plan for ID issues  per course,results.Will defer to primary team on management of other issues.  Assessment, plan and recommendations as detailed above were discussed with the medical/primary  team.  Will Follow.  Beeper [phone] [facility] [id number].   Wknd/afterhours/No response-[phone] or Fellow on call

## 2021-04-27 NOTE — PROGRESS NOTE ADULT - SUBJECTIVE AND OBJECTIVE BOX
Medicine Progress Note    Patient is a 60y old  Female who presents with a chief complaint of shortness of breath (2021 09:52)      SUBJECTIVE / OVERNIGHT EVENTS: remains febrile, denies cough, diarrhea, dysuria, nausea, sore throat    ADDITIONAL REVIEW OF SYSTEMS:    MEDICATIONS  (STANDING):  ALBUTerol    90 MICROgram(s) HFA Inhaler 1 Puff(s) Inhalation every 4 hours  ALBUTerol    90 MICROgram(s) HFA Inhaler 2 Puff(s) Inhalation every 6 hours  aspirin enteric coated 81 milliGRAM(s) Oral daily  dextrose 40% Gel 15 Gram(s) Oral once  dextrose 5%. 1000 milliLiter(s) (50 mL/Hr) IV Continuous <Continuous>  dextrose 5%. 1000 milliLiter(s) (100 mL/Hr) IV Continuous <Continuous>  dextrose 50% Injectable 25 Gram(s) IV Push once  dextrose 50% Injectable 12.5 Gram(s) IV Push once  dextrose 50% Injectable 25 Gram(s) IV Push once  enoxaparin Injectable 40 milliGRAM(s) SubCutaneous daily  glucagon  Injectable 1 milliGRAM(s) IntraMuscular once  insulin glargine Injectable (LANTUS) 15 Unit(s) SubCutaneous at bedtime  insulin lispro (ADMELOG) corrective regimen sliding scale   SubCutaneous three times a day before meals  insulin lispro (ADMELOG) corrective regimen sliding scale   SubCutaneous at bedtime  insulin lispro Injectable (ADMELOG) 5 Unit(s) SubCutaneous three times a day before meals  ipratropium 17 MICROgram(s) HFA Inhaler 1 Puff(s) Inhalation every 6 hours  losartan 100 milliGRAM(s) Oral daily  PARoxetine 10 milliGRAM(s) Oral daily  polyethylene glycol 3350 17 Gram(s) Oral daily  senna 2 Tablet(s) Oral at bedtime    MEDICATIONS  (PRN):  acetaminophen   Tablet .. 650 milliGRAM(s) Oral every 6 hours PRN Temp greater or equal to 38C (100.4F), Mild Pain (1 - 3), Moderate Pain (4 - 6)  aluminum hydroxide/magnesium hydroxide/simethicone Suspension 30 milliLiter(s) Oral every 4 hours PRN Dyspepsia  benzonatate 100 milliGRAM(s) Oral every 8 hours PRN Cough  LORazepam   Injectable 0.5 milliGRAM(s) IV Push every 6 hours PRN Anxiety  sodium chloride 0.65% Nasal 1 Spray(s) Both Nostrils two times a day PRN Congestion    CAPILLARY BLOOD GLUCOSE      POCT Blood Glucose.: 131 mg/dL (2021 09:23)  POCT Blood Glucose.: 129 mg/dL (2021 21:14)  POCT Blood Glucose.: 123 mg/dL (2021 17:58)  POCT Blood Glucose.: 145 mg/dL (2021 13:28)    I&O's Summary    2021 07:01  -  2021 07:00  --------------------------------------------------------  IN: 0 mL / OUT: 460 mL / NET: -460 mL        PHYSICAL EXAM:  Vital Signs Last 24 Hrs  T(C): 37.1 (2021 05:22), Max: 38.6 (2021 21:19)  T(F): 98.8 (2021 05:22), Max: 101.4 (2021 21:19)  HR: 115 (2021 05:22) (115 - 123)  BP: 138/97 (2021 05:22) (138/97 - 152/91)  BP(mean): --  RR: 20 (2021 05:22) (20 - 22)  SpO2: 97% (2021 05:22) (97% - 97%)  CONSTITUTIONAL: NAD, well-developed, well-groomed  ENMT: Moist oral mucosa, no pharyngeal injection or exudates; normal dentition  RESPIRATORY: diminished breath sounds b/l  CARDIOVASCULAR: tachycardic  ABDOMEN: Nontender to palpation, normoactive bowel sounds, no rebound/guarding; No hepatosplenomegaly  PSYCH: A+O to person, place, and time; affect appropriate  NEUROLOGY: CN 2-12 are intact and symmetric; no gross sensory deficits   SKIN: No rashes; no palpable lesions    LABS:                        11.6   11.29 )-----------( 465      ( 2021 07:51 )             35.2     04    141  |  104  |  9   ----------------------------<  152<H>  3.7   |  25  |  0.68    Ca    9.0      2021 07:51            Urinalysis Basic - ( 2021 18:28 )    Color: Light Yellow / Appearance: Clear / S.014 / pH: x  Gluc: x / Ketone: Negative  / Bili: Negative / Urobili: <2 mg/dL   Blood: x / Protein: Trace / Nitrite: Negative   Leuk Esterase: Negative / RBC: x / WBC x   Sq Epi: x / Non Sq Epi: x / Bacteria: x        COVID-19 PCR: Detected (2021 10:15)      RADIOLOGY & ADDITIONAL TESTS:  Imaging from Last 24 Hours:    Electrocardiogram/QTc Interval:    COORDINATION OF CARE:  Care Discussed with Consultants/Other Providers:

## 2021-04-27 NOTE — PROGRESS NOTE ADULT - PROBLEM SELECTOR PLAN 3
- CT Angio chest w/ IV contrast-  no pulmonary embolus.  Extensive opacities are noted throughout both lungs. They're consistent with the patient's known history of Covid pneumonia.  - Monitor IM -> D-dimer significantly elevated, check b/l LE dopplers to r/o DVT - NEG  - decadron, remdesivir completed  - supportive care with PRN cough medications, Tylenol, albuterol   - encourage incentive spirometer use  -will need o/p CROWN support  -PT to eval, remains deconditioned and weak, PT recommends home PT - CT Angio chest w/ IV contrast-  no pulmonary embolus.  Extensive opacities are noted throughout both lungs. They're consistent with the patient's known history of Covid pneumonia.  - Monitor IM -> D-dimer significantly elevated, check b/l LE dopplers to r/o DVT - NEG  - decadron, remdesivir completed  - recheck CT

## 2021-04-27 NOTE — CONSULT NOTE ADULT - SUBJECTIVE AND OBJECTIVE BOX
Patient is a 60y old  Female who presents with a chief complaint of shortness of breath (2021 12:45)    HPI:  60 year old female with PMhx of T2DM (on Metformin), HTN and depression presents with worsening shortness of breath and fatigue.  Patient reports she tested COVID 19 positive 4 days ago, and now with worsening of breath associated with pleuritic chest pain and dry cough.  She reports fatigue and body aches associated with poor po intake. She episodes of fever and chills, improved/ resolves with Tylenol.  She denies any nausea, vomiting or diarrhea. Patient had recent ill exposure to her son. Patient was BIBEMs, found hypoxic to 80s, given 4L NC on transit. In ED, patient received decadron 6mg IVP, albuterol and 1L of LR. On exam, patient tachypneic RR22-24 and tachycardic 105-115 bpm, with 02 saturation of 98% on 5L NC.  (2021 16:28)    Admitted on  with COVID, known tested positive 4 days before admission.   Currently on NC 2L, did not change much since admission.  s/p remdesivir -  s/p Dexamethasone -  Spiking fever since -.  ID consulted for fever.    REVIEW OF SYSTEMS  [  ] ROS unobtainable because:    [  ] All other systems negative except as noted below    Constitutional:  [ ] fever [ ] chills  [ ] weight loss  [ ]night sweat  [ ]poor appetite/PO intake [ ]fatigue   Skin:  [ ] rash [ ] phlebitis	  Eyes: [ ] icterus [ ] pain  [ ] discharge	  ENMT: [ ] sore throat  [ ] thrush [ ] ulcers [ ] exudates [ ]anosmia  Respiratory: [ ] dyspnea [ ] hemoptysis [ ] cough [ ] sputum	  Cardiovascular:  [ ] chest pain [ ] palpitations [ ] edema	  Gastrointestinal:  [ ] nausea [ ] vomiting [ ] diarrhea [ ] constipation [ ] pain	  Genitourinary:  [ ] dysuria [ ] frequency [ ] hematuria [ ] discharge [ ] flank pain  [ ] incontinence  Musculoskeletal:  [ ] myalgias [ ] arthralgias [ ] arthritis  [ ] back pain  Neurological:  [ ] headache [ ] weakness [ ] seizures  [ ] confusion/altered mental status    prior hospital charts reviewed [V]  primary team notes reviewed [V]  other consultant notes reviewed [V]    PAST MEDICAL & SURGICAL HISTORY:  Diabetes mellitus  HTN (hypertension)  Depression  No significant past surgical history      SOCIAL HISTORY:  Lives with family. , has son. Denies ETOH, tobacco or illicit drug use    FAMILY HISTORY:  FH: diabetes mellitus    Allergies  erythromycin topical (Unknown)  Keflex (Unknown)    ANTIMICROBIALS: Off    ANTIMICROBIALS (past 90 days):  MEDICATIONS  (STANDING):  remdesivir  IVPB -17        OTHER MEDS:   MEDICATIONS  (STANDING):  acetaminophen   Tablet .. 650 every 6 hours PRN  ALBUTerol    90 MICROgram(s) HFA Inhaler 1 every 4 hours  ALBUTerol    90 MICROgram(s) HFA Inhaler 2 every 6 hours  aluminum hydroxide/magnesium hydroxide/simethicone Suspension 30 every 4 hours PRN  aspirin enteric coated 81 daily  benzonatate 100 every 8 hours PRN  dextrose 40% Gel 15 once  dextrose 50% Injectable 25 once  dextrose 50% Injectable 12.5 once  dextrose 50% Injectable 25 once  enoxaparin Injectable 40 daily  glucagon  Injectable 1 once  insulin glargine Injectable (LANTUS) 15 at bedtime  insulin lispro (ADMELOG) corrective regimen sliding scale  three times a day before meals  insulin lispro (ADMELOG) corrective regimen sliding scale  at bedtime  insulin lispro Injectable (ADMELOG) 5 three times a day before meals  ipratropium 17 MICROgram(s) HFA Inhaler 1 every 6 hours  LORazepam   Injectable 0.5 every 6 hours PRN  losartan 100 daily  PARoxetine 10 daily  polyethylene glycol 3350 17 daily  senna 2 at bedtime      VITALS:  Vital Signs Last 24 Hrs  T(F): 98.8 (21 @ 05:22), Max: 101.4 (21 @ 21:19)    Vital Signs Last 24 Hrs  HR: 115 (21 @ 05:22) (115 - 125)  BP: 138/97 (21 @ 05:22) (138/97 - 152/91)  RR: 20 (21 @ 05:22)  SpO2: 97% (21 @ 05:22) (95% - 97%)  Wt(kg): --    EXAM:  GA: NAD  HEENT: oral cavity no lesion  CV: nl S1/S2, no RMG  Lungs: CTAB  Abd: BS+, soft, nontender, no rebounding pain  Ext: no edema  Skin:  IV: no phlebitis    Labs:                        11.6   11.29 )-----------( 465      ( 2021 07:51 )             35.2         141  |  104  |  9   ----------------------------<  152<H>  3.7   |  25  |  0.68    Ca    9.0      2021 07:51        WBC Trend:  WBC Count: 11.29 (21 @ 07:51)  WBC Count: 14.63 (21 @ 06:54)  WBC Count: 15.83 (21 @ 07:35)  WBC Count: 19.32 (21 @ 07:06)      Auto Neutrophil #: 12.82 K/uL (21 @ 06:50)  Auto Neutrophil #: 13.91 K/uL (21 @ 06:59)  Auto Neutrophil #: 14.39 K/uL (21 @ 07:41)  Auto Neutrophil #: 13.55 K/uL (21 @ 08:51)  Auto Neutrophil #: 10.70 K/uL (21 @ 06:42)      Creatine Trend:  Creatinine, Serum: 0.68 ()  Creatinine, Serum: 0.68 ()  Creatinine, Serum: 0.69 ()  Creatinine, Serum: 0.77 ()      Liver Biochemical Testing Trend:  Alanine Aminotransferase (ALT/SGPT): 18 ()  Alanine Aminotransferase (ALT/SGPT): 18 ()  Alanine Aminotransferase (ALT/SGPT): 16 ()  Alanine Aminotransferase (ALT/SGPT): 16 ()  Alanine Aminotransferase (ALT/SGPT): 19 ()  Aspartate Aminotransferase (AST/SGOT): 19 (21 @ 06:54)  Aspartate Aminotransferase (AST/SGOT): 16 (21 @ 07:06)  Aspartate Aminotransferase (AST/SGOT): 19 (21 @ 06:50)  Aspartate Aminotransferase (AST/SGOT): 17 (21 @ 06:50)  Aspartate Aminotransferase (AST/SGOT): 17 (21 @ 06:59)  Bilirubin Direct, Serum: <0.2 ()  Bilirubin Total, Serum: 0.4 ()  Bilirubin Total, Serum: 0.4 ()  Bilirubin Total, Serum: 0.4 ()  Bilirubin Total, Serum: 0.4 ()      Trend LDH  21 @ 06:54  302<H>  21 @ 06:50  574<H>  21 @ 08:50  418<H>  21 @ 06:54  423<H>      Urinalysis Basic - ( 2021 18:28 )  Color: Light Yellow / Appearance: Clear / S.014 / pH: x  Gluc: x / Ketone: Negative  / Bili: Negative / Urobili: <2 mg/dL   Blood: x / Protein: Trace / Nitrite: Negative   Leuk Esterase: Negative / RBC: x / WBC x   Sq Epi: x / Non Sq Epi: x / Bacteria: x        MICROBIOLOGY:    Culture - Blood (collected 2021 01:56)  Source: .Blood Blood-Venous  Preliminary Report:    No growth to date.    Culture - Blood (collected 2021 01:56)  Source: .Blood Blood-Peripheral  Preliminary Report:    No growth to date.    Culture - Blood (collected 2021 10:00)  Source: .Blood Blood-Peripheral  Final Report:    No Growth Final    Culture - Blood (collected 2021 10:00)  Source: .Blood Blood-Peripheral  Final Report:    No Growth Final    COVID-19 PCR: Detected (21 @ 10:15)  COVID-19 Kraig Domain AB Interp: Negative (21 @ 10:06)    Procalcitonin, Serum: 0.12 ()  Procalcitonin, Serum: 0.05 ()    C-Reactive Protein, Serum: 106.9 ()  C-Reactive Protein, Serum: 9.1 ()    Ferritin, Serum: 450 ()  Ferritin, Serum: 493 ()    D-Dimer Assay, Quantitative: 1040 ()  D-Dimer Assay, Quantitative: 1636 ()  D-Dimer Assay, Quantitative: 2371 ()    Lactate Dehydrogenase, Serum: 302 ()  Lactate Dehydrogenase, Serum: 574 ()    A1C with Estimated Average Glucose Result: 7.8 % (21 @ 07:36)  A1C with Estimated Average Glucose Result: 7.8 % (21 @ 09:44)      RADIOLOGY:  imaging below personally reviewed  < from: Xray Chest 1 View- PORTABLE-Urgent (21 @ 13:16) >    IMPRESSION:   Bilateral perihilar and peripheral  multifocal and diffuse ill-defined airspace opacities..    < end of copied text >       Patient is a 60y old  Female who presents with a chief complaint of shortness of breath (2021 12:45)    HPI:  60 year old female with PMhx of T2DM (on Metformin), HTN and depression presents with worsening shortness of breath and fatigue.  Patient reports she tested COVID 19 positive 4 days ago, and now with worsening of breath associated with pleuritic chest pain and dry cough.  She reports fatigue and body aches associated with poor po intake. She episodes of fever and chills, improved/ resolves with Tylenol.  She denies any nausea, vomiting or diarrhea. Patient had recent ill exposure to her son. Patient was BIBEMs, found hypoxic to 80s, given 4L NC on transit. In ED, patient received decadron 6mg IVP, albuterol and 1L of LR. On exam, patient tachypneic RR22-24 and tachycardic 105-115 bpm, with 02 saturation of 98% on 5L NC.  (2021 16:28)    Admitted on  with COVID, known tested positive 4 days before admission.   Currently on NC 2L, did not change much since admission.  s/p remdesivir -  s/p Dexamethasone -  Spiking fever since -.  ID consulted for fever.    REVIEW OF SYSTEMS  [  ] ROS unobtainable because:    [ V ] All other systems negative except as noted below    Constitutional:  [ V] fever [ V] chills  [ ] weight loss  [ ]night sweat  [ ]poor appetite/PO intake [ V]fatigue   Skin:  [ ] rash [ ] phlebitis	  Eyes: [ ] icterus [ ] pain  [ ] discharge	  ENMT: [ ] sore throat  [ ] thrush [ ] ulcers [ ] exudates [ ]anosmia  Respiratory: [ ] dyspnea [ ] hemoptysis [ ] cough [ ] sputum	  Cardiovascular:  [ ] chest pain [ ] palpitations [ ] edema	  Gastrointestinal:  [ ] nausea [ ] vomiting [ ] diarrhea [ ] constipation [ ] pain	  Genitourinary:  [ ] dysuria [ ] frequency [ ] hematuria [ ] discharge [ ] flank pain  [ ] incontinence  Musculoskeletal:  [ ] myalgias [ ] arthralgias [ ] arthritis  [ ] back pain  Neurological:  [ ] headache [ ] weakness [ ] seizures  [ ] confusion/altered mental status    prior hospital charts reviewed [V]  primary team notes reviewed [V]  other consultant notes reviewed [V]    PAST MEDICAL & SURGICAL HISTORY:  Diabetes mellitus  HTN (hypertension)  Depression  No significant past surgical history      SOCIAL HISTORY:  Lives with family. , has son. Denies ETOH, tobacco or illicit drug use. Worked as .    FAMILY HISTORY:  FH: diabetes mellitus    Allergies  erythromycin topical (Unknown)  Keflex (Unknown): per pt, she got arm "IV infiltration" from keflex around 31s yo, did not have systemic issue.    ANTIMICROBIALS: Off    ANTIMICROBIALS (past 90 days):  MEDICATIONS  (STANDING):  remdesivir  IVPB -        OTHER MEDS:   MEDICATIONS  (STANDING):  acetaminophen   Tablet .. 650 every 6 hours PRN  ALBUTerol    90 MICROgram(s) HFA Inhaler 1 every 4 hours  ALBUTerol    90 MICROgram(s) HFA Inhaler 2 every 6 hours  aluminum hydroxide/magnesium hydroxide/simethicone Suspension 30 every 4 hours PRN  aspirin enteric coated 81 daily  benzonatate 100 every 8 hours PRN  dextrose 40% Gel 15 once  dextrose 50% Injectable 25 once  dextrose 50% Injectable 12.5 once  dextrose 50% Injectable 25 once  enoxaparin Injectable 40 daily  glucagon  Injectable 1 once  insulin glargine Injectable (LANTUS) 15 at bedtime  insulin lispro (ADMELOG) corrective regimen sliding scale  three times a day before meals  insulin lispro (ADMELOG) corrective regimen sliding scale  at bedtime  insulin lispro Injectable (ADMELOG) 5 three times a day before meals  ipratropium 17 MICROgram(s) HFA Inhaler 1 every 6 hours  LORazepam   Injectable 0.5 every 6 hours PRN  losartan 100 daily  PARoxetine 10 daily  polyethylene glycol 3350 17 daily  senna 2 at bedtime      VITALS:  Vital Signs Last 24 Hrs  T(F): 98.8 (21 @ 05:22), Max: 101.4 (21 @ 21:19)    Vital Signs Last 24 Hrs  HR: 115 (21 @ 05:22) (115 - 125)  BP: 138/97 (21 @ 05:22) (138/97 - 152/91)  RR: 20 (21 @ 05:22)  SpO2: 97% (21 @ 05:22) (95% - 97%)  Wt(kg): --    EXAM:  GA: ill, on NC  HEENT: oral cavity no lesion  CV: tachycardia  Lungs: CTAB  Abd: BS+, soft, nontender, no rebounding pain  Ext: no edema  Skin: no rash  IV: no phlebitis    Labs:                        11.6   11.29 )-----------( 465      ( 2021 07:51 )             35.2         141  |  104  |  9   ----------------------------<  152<H>  3.7   |  25  |  0.68    Ca    9.0      2021 07:51        WBC Trend:  WBC Count: 11.29 (21 @ 07:51)  WBC Count: 14.63 (21 @ 06:54)  WBC Count: 15.83 (21 @ 07:35)  WBC Count: 19.32 (21 @ 07:06)      Auto Neutrophil #: 12.82 K/uL (21 @ 06:50)  Auto Neutrophil #: 13.91 K/uL (21 @ 06:59)  Auto Neutrophil #: 14.39 K/uL (21 @ 07:41)  Auto Neutrophil #: 13.55 K/uL (21 @ 08:51)  Auto Neutrophil #: 10.70 K/uL (21 @ 06:42)      Creatine Trend:  Creatinine, Serum: 0.68 ()  Creatinine, Serum: 0.68 ()  Creatinine, Serum: 0.69 ()  Creatinine, Serum: 0.77 ()      Liver Biochemical Testing Trend:  Alanine Aminotransferase (ALT/SGPT): 18 ()  Alanine Aminotransferase (ALT/SGPT): 18 ()  Alanine Aminotransferase (ALT/SGPT): 16 ()  Alanine Aminotransferase (ALT/SGPT): 16 ()  Alanine Aminotransferase (ALT/SGPT): 19 ()  Aspartate Aminotransferase (AST/SGOT): 19 (21 @ 06:54)  Aspartate Aminotransferase (AST/SGOT): 16 (21 @ 07:06)  Aspartate Aminotransferase (AST/SGOT): 19 (21 @ 06:50)  Aspartate Aminotransferase (AST/SGOT): 17 (21 @ 06:50)  Aspartate Aminotransferase (AST/SGOT): 17 (21 @ 06:59)  Bilirubin Direct, Serum: <0.2 ()  Bilirubin Total, Serum: 0.4 ()  Bilirubin Total, Serum: 0.4 ()  Bilirubin Total, Serum: 0.4 ()  Bilirubin Total, Serum: 0.4 ()      Trend LDH  21 @ 06:54  302<H>  21 @ 06:50  574<H>  21 @ 08:50  418<H>  21 @ 06:54  423<H>      Urinalysis Basic - ( 2021 18:28 )  Color: Light Yellow / Appearance: Clear / S.014 / pH: x  Gluc: x / Ketone: Negative  / Bili: Negative / Urobili: <2 mg/dL   Blood: x / Protein: Trace / Nitrite: Negative   Leuk Esterase: Negative / RBC: x / WBC x   Sq Epi: x / Non Sq Epi: x / Bacteria: x        MICROBIOLOGY:    Culture - Blood (collected 2021 01:56)  Source: .Blood Blood-Venous  Preliminary Report:    No growth to date.    Culture - Blood (collected 2021 01:56)  Source: .Blood Blood-Peripheral  Preliminary Report:    No growth to date.    Culture - Blood (collected 2021 10:00)  Source: .Blood Blood-Peripheral  Final Report:    No Growth Final    Culture - Blood (collected 2021 10:00)  Source: .Blood Blood-Peripheral  Final Report:    No Growth Final    COVID-19 PCR: Detected (21 @ 10:15)  COVID-19 Kraig Domain AB Interp: Negative (21 @ 10:06)    Procalcitonin, Serum: 0.12 ()  Procalcitonin, Serum: 0.05 ()    C-Reactive Protein, Serum: 106.9 ()  C-Reactive Protein, Serum: 9.1 ()    Ferritin, Serum: 450 ()  Ferritin, Serum: 493 ()    D-Dimer Assay, Quantitative: 1040 ()  D-Dimer Assay, Quantitative: 1636 ()  D-Dimer Assay, Quantitative: 2371 ()    Lactate Dehydrogenase, Serum: 302 ()  Lactate Dehydrogenase, Serum: 574 ()    A1C with Estimated Average Glucose Result: 7.8 % (21 @ 07:36)  A1C with Estimated Average Glucose Result: 7.8 % (21 @ 09:44)      RADIOLOGY:  imaging below personally reviewed  < from: Xray Chest 1 View- PORTABLE-Urgent (21 @ 13:16) >    IMPRESSION:   Bilateral perihilar and peripheral  multifocal and diffuse ill-defined airspace opacities..    < end of copied text >       Patient is a 60y old  Female who presents with a chief complaint of shortness of breath (2021 12:45)    HPI:  60 year old female with PMhx of T2DM (on Metformin), HTN and depression presents with worsening shortness of breath and fatigue.  Patient reports she tested COVID 19 positive 4 days ago, and now with worsening of breath associated with pleuritic chest pain and dry cough.  She reports fatigue and body aches associated with poor po intake. She episodes of fever and chills, improved/ resolves with Tylenol.  She denies any nausea, vomiting or diarrhea. Patient had recent ill exposure to her son. Patient was BIBEMs, found hypoxic to 80s, given 4L NC on transit. In ED, patient received decadron 6mg IVP, albuterol and 1L of LR. On exam, patient tachypneic RR22-24 and tachycardic 105-115 bpm, with 02 saturation of 98% on 5L NC.  (2021 16:28)    Admitted on  with COVID, known tested positive 4 days before admission.   Currently on NC 2L, did not change much since admission.  s/p remdesivir -  s/p Dexamethasone -  Spiking fever since -.  ID consulted for fever.    REVIEW OF SYSTEMS  [  ] ROS unobtainable because:    [ V ] All other systems negative except as noted below    Constitutional:  [ V] fever [ V] chills  [ ] weight loss  [ ]night sweat  [ ]poor appetite/PO intake [ V]fatigue   Skin:  [ ] rash [ ] phlebitis	  Eyes: [ ] icterus [ ] pain  [ ] discharge	  ENMT: [ ] sore throat  [ ] thrush [ ] ulcers [ ] exudates [ ]anosmia  Respiratory: [ ] dyspnea [ ] hemoptysis [ ] cough [ ] sputum	  Cardiovascular:  [ ] chest pain [ ] palpitations [ ] edema	  Gastrointestinal:  [ ] nausea [ ] vomiting [ ] diarrhea [ ] constipation [ ] pain	  Genitourinary:  [ ] dysuria [ ] frequency [ ] hematuria [ ] discharge [ ] flank pain  [ ] incontinence  Musculoskeletal:  [ ] myalgias [ ] arthralgias [ ] arthritis  [ ] back pain  Neurological:  [ ] headache [ ] weakness [ ] seizures  [ ] confusion/altered mental status    prior hospital charts reviewed [V]  primary team notes reviewed [V]  other consultant notes reviewed [V]    PAST MEDICAL & SURGICAL HISTORY:  Diabetes mellitus  HTN (hypertension)  Depression  No significant past surgical history      SOCIAL HISTORY:  Lives with family. , has son. Denies ETOH, tobacco or illicit drug use. Worked as .    FAMILY HISTORY:  FH: diabetes mellitus    Allergies  erythromycin topical (Unknown)  Keflex (Unknown): per pt, she got arm "IV infiltration" from keflex around 31s yo, did not have systemic issue.    ANTIMICROBIALS: Off    ANTIMICROBIALS (past 90 days):  MEDICATIONS  (STANDING):  remdesivir  IVPB -        OTHER MEDS:   MEDICATIONS  (STANDING):  acetaminophen   Tablet .. 650 every 6 hours PRN  ALBUTerol    90 MICROgram(s) HFA Inhaler 1 every 4 hours  ALBUTerol    90 MICROgram(s) HFA Inhaler 2 every 6 hours  aluminum hydroxide/magnesium hydroxide/simethicone Suspension 30 every 4 hours PRN  aspirin enteric coated 81 daily  benzonatate 100 every 8 hours PRN  dextrose 40% Gel 15 once  dextrose 50% Injectable 25 once  dextrose 50% Injectable 12.5 once  dextrose 50% Injectable 25 once  enoxaparin Injectable 40 daily  glucagon  Injectable 1 once  insulin glargine Injectable (LANTUS) 15 at bedtime  insulin lispro (ADMELOG) corrective regimen sliding scale  three times a day before meals  insulin lispro (ADMELOG) corrective regimen sliding scale  at bedtime  insulin lispro Injectable (ADMELOG) 5 three times a day before meals  ipratropium 17 MICROgram(s) HFA Inhaler 1 every 6 hours  LORazepam   Injectable 0.5 every 6 hours PRN  losartan 100 daily  PARoxetine 10 daily  polyethylene glycol 3350 17 daily  senna 2 at bedtime      VITALS:  Vital Signs Last 24 Hrs  T(F): 98.8 (21 @ 05:22), Max: 101.4 (21 @ 21:19)    Vital Signs Last 24 Hrs  HR: 115 (21 @ 05:22) (115 - 125)  BP: 138/97 (21 @ 05:22) (138/97 - 152/91)  RR: 20 (21 @ 05:22)  SpO2: 97% (21 @ 05:22) (95% - 97%)  Wt(kg): --    EXAM:  GA: ill, on NC  HEENT: oral cavity no lesion  CV: tachycardia  Lungs: crepitus in the lungs?  Abd: BS+, soft, nontender, no rebounding pain  Ext: no edema  Skin: no rash  IV: no phlebitis    Labs:                        11.6   11.29 )-----------( 465      ( 2021 07:51 )             35.2         141  |  104  |  9   ----------------------------<  152<H>  3.7   |  25  |  0.68    Ca    9.0      2021 07:51        WBC Trend:  WBC Count: 11.29 (21 @ 07:51)  WBC Count: 14.63 (21 @ 06:54)  WBC Count: 15.83 (21 @ 07:35)  WBC Count: 19.32 (21 @ 07:06)      Auto Neutrophil #: 12.82 K/uL (21 @ 06:50)  Auto Neutrophil #: 13.91 K/uL (21 @ 06:59)  Auto Neutrophil #: 14.39 K/uL (21 @ 07:41)  Auto Neutrophil #: 13.55 K/uL (21 @ 08:51)  Auto Neutrophil #: 10.70 K/uL (21 @ 06:42)      Creatine Trend:  Creatinine, Serum: 0.68 ()  Creatinine, Serum: 0.68 ()  Creatinine, Serum: 0.69 ()  Creatinine, Serum: 0.77 ()      Liver Biochemical Testing Trend:  Alanine Aminotransferase (ALT/SGPT): 18 ()  Alanine Aminotransferase (ALT/SGPT): 18 ()  Alanine Aminotransferase (ALT/SGPT): 16 ()  Alanine Aminotransferase (ALT/SGPT): 16 ()  Alanine Aminotransferase (ALT/SGPT): 19 ()  Aspartate Aminotransferase (AST/SGOT): 19 (21 @ 06:54)  Aspartate Aminotransferase (AST/SGOT): 16 (21 @ 07:06)  Aspartate Aminotransferase (AST/SGOT): 19 (21 @ 06:50)  Aspartate Aminotransferase (AST/SGOT): 17 (21 @ 06:50)  Aspartate Aminotransferase (AST/SGOT): 17 (21 @ 06:59)  Bilirubin Direct, Serum: <0.2 (-25)  Bilirubin Total, Serum: 0.4 ()  Bilirubin Total, Serum: 0.4 ()  Bilirubin Total, Serum: 0.4 ()  Bilirubin Total, Serum: 0.4 (-)      Trend LDH  21 @ 06:54  302<H>  21 @ 06:50  574<H>  21 @ 08:50  418<H>  21 @ 06:54  423<H>      Urinalysis Basic - ( 2021 18:28 )  Color: Light Yellow / Appearance: Clear / S.014 / pH: x  Gluc: x / Ketone: Negative  / Bili: Negative / Urobili: <2 mg/dL   Blood: x / Protein: Trace / Nitrite: Negative   Leuk Esterase: Negative / RBC: x / WBC x   Sq Epi: x / Non Sq Epi: x / Bacteria: x        MICROBIOLOGY:    Culture - Blood (collected 2021 01:56)  Source: .Blood Blood-Venous  Preliminary Report:    No growth to date.    Culture - Blood (collected 2021 01:56)  Source: .Blood Blood-Peripheral  Preliminary Report:    No growth to date.    Culture - Blood (collected 2021 10:00)  Source: .Blood Blood-Peripheral  Final Report:    No Growth Final    Culture - Blood (collected 2021 10:00)  Source: .Blood Blood-Peripheral  Final Report:    No Growth Final    COVID-19 PCR: Detected (21 @ 10:15)  COVID-19 Kraig Domain AB Interp: Negative (21 @ 10:06)    Procalcitonin, Serum: 0.12 ()  Procalcitonin, Serum: 0.05 ()    C-Reactive Protein, Serum: 106.9 ()  C-Reactive Protein, Serum: 9.1 ()    Ferritin, Serum: 450 ()  Ferritin, Serum: 493 ()    D-Dimer Assay, Quantitative: 1040 ()  D-Dimer Assay, Quantitative: 1636 (-)  D-Dimer Assay, Quantitative: 2371 ()    Lactate Dehydrogenase, Serum: 302 ()  Lactate Dehydrogenase, Serum: 574 ()    A1C with Estimated Average Glucose Result: 7.8 % (21 @ 07:36)  A1C with Estimated Average Glucose Result: 7.8 % (21 @ 09:44)      RADIOLOGY:  imaging below personally reviewed  < from: Xray Chest 1 View- PORTABLE-Urgent (21 @ 13:16) >    IMPRESSION:   Bilateral perihilar and peripheral  multifocal and diffuse ill-defined airspace opacities..    < end of copied text >

## 2021-04-27 NOTE — PROGRESS NOTE ADULT - PROBLEM SELECTOR PLAN 1
check ct chest, start zosyn, case d/w Dr. Mcknight, appreciate ID input, f/u cultures check ct chest to r/o pe, start zosyn, case d/w Dr. Mcknight, appreciate ID input, f/u cultures

## 2021-04-27 NOTE — PROGRESS NOTE ADULT - PROBLEM SELECTOR PLAN 2
- Acute hypoxemic respiratory failure 2/2 COVID 19 PNA   - continuous pulse oximetry, wean off supplemental oxygen as tolerated  - still requiring 3L NC currently, Increased HR with palpitations  -home o2 delivered  -remains tachycardic and febrile, will ask ID to re-assess - Acute hypoxemic respiratory failure 2/2 COVID 19 PNA   - continuous pulse oximetry, wean off supplemental oxygen as tolerated  - still requiring 3L NC currently, Increased HR with palpitations  -home o2 delivered  -remains tachycardic and febrile, appreciate ID recs

## 2021-04-28 LAB
ANION GAP SERPL CALC-SCNC: 10 MMOL/L — SIGNIFICANT CHANGE UP (ref 7–14)
BUN SERPL-MCNC: 11 MG/DL — SIGNIFICANT CHANGE UP (ref 7–23)
CALCIUM SERPL-MCNC: 9.1 MG/DL — SIGNIFICANT CHANGE UP (ref 8.4–10.5)
CHLORIDE SERPL-SCNC: 105 MMOL/L — SIGNIFICANT CHANGE UP (ref 98–107)
CO2 SERPL-SCNC: 25 MMOL/L — SIGNIFICANT CHANGE UP (ref 22–31)
CREAT SERPL-MCNC: 0.78 MG/DL — SIGNIFICANT CHANGE UP (ref 0.5–1.3)
CRP SERPL-MCNC: 143.1 MG/L — HIGH
FERRITIN SERPL-MCNC: 477 NG/ML — HIGH (ref 15–150)
GLUCOSE SERPL-MCNC: 106 MG/DL — HIGH (ref 70–99)
HCT VFR BLD CALC: 34.4 % — LOW (ref 34.5–45)
HGB BLD-MCNC: 11.1 G/DL — LOW (ref 11.5–15.5)
LDH SERPL L TO P-CCNC: 304 U/L — HIGH (ref 135–225)
MAGNESIUM SERPL-MCNC: 2.2 MG/DL — SIGNIFICANT CHANGE UP (ref 1.6–2.6)
MCHC RBC-ENTMCNC: 27.8 PG — SIGNIFICANT CHANGE UP (ref 27–34)
MCHC RBC-ENTMCNC: 32.3 GM/DL — SIGNIFICANT CHANGE UP (ref 32–36)
MCV RBC AUTO: 86 FL — SIGNIFICANT CHANGE UP (ref 80–100)
NRBC # BLD: 0 /100 WBCS — SIGNIFICANT CHANGE UP
NRBC # FLD: 0 K/UL — SIGNIFICANT CHANGE UP
PHOSPHATE SERPL-MCNC: 3 MG/DL — SIGNIFICANT CHANGE UP (ref 2.5–4.5)
PLATELET # BLD AUTO: 417 K/UL — HIGH (ref 150–400)
POTASSIUM SERPL-MCNC: 4.3 MMOL/L — SIGNIFICANT CHANGE UP (ref 3.5–5.3)
POTASSIUM SERPL-SCNC: 4.3 MMOL/L — SIGNIFICANT CHANGE UP (ref 3.5–5.3)
RBC # BLD: 4 M/UL — SIGNIFICANT CHANGE UP (ref 3.8–5.2)
RBC # FLD: 15.7 % — HIGH (ref 10.3–14.5)
SODIUM SERPL-SCNC: 140 MMOL/L — SIGNIFICANT CHANGE UP (ref 135–145)
WBC # BLD: 9.31 K/UL — SIGNIFICANT CHANGE UP (ref 3.8–10.5)
WBC # FLD AUTO: 9.31 K/UL — SIGNIFICANT CHANGE UP (ref 3.8–10.5)

## 2021-04-28 PROCEDURE — 99233 SBSQ HOSP IP/OBS HIGH 50: CPT

## 2021-04-28 PROCEDURE — 99232 SBSQ HOSP IP/OBS MODERATE 35: CPT

## 2021-04-28 RX ORDER — INSULIN GLARGINE 100 [IU]/ML
12 INJECTION, SOLUTION SUBCUTANEOUS AT BEDTIME
Refills: 0 | Status: DISCONTINUED | OUTPATIENT
Start: 2021-04-28 | End: 2021-05-06

## 2021-04-28 RX ORDER — INSULIN LISPRO 100/ML
4 VIAL (ML) SUBCUTANEOUS
Refills: 0 | Status: DISCONTINUED | OUTPATIENT
Start: 2021-04-28 | End: 2021-05-06

## 2021-04-28 RX ADMIN — Medication 81 MILLIGRAM(S): at 12:20

## 2021-04-28 RX ADMIN — ALBUTEROL 2 PUFF(S): 90 AEROSOL, METERED ORAL at 18:34

## 2021-04-28 RX ADMIN — PIPERACILLIN AND TAZOBACTAM 25 GRAM(S): 4; .5 INJECTION, POWDER, LYOPHILIZED, FOR SOLUTION INTRAVENOUS at 21:41

## 2021-04-28 RX ADMIN — PIPERACILLIN AND TAZOBACTAM 25 GRAM(S): 4; .5 INJECTION, POWDER, LYOPHILIZED, FOR SOLUTION INTRAVENOUS at 14:14

## 2021-04-28 RX ADMIN — Medication 1 PUFF(S): at 12:21

## 2021-04-28 RX ADMIN — POLYETHYLENE GLYCOL 3350 17 GRAM(S): 17 POWDER, FOR SOLUTION ORAL at 12:20

## 2021-04-28 RX ADMIN — Medication 650 MILLIGRAM(S): at 07:30

## 2021-04-28 RX ADMIN — ALBUTEROL 2 PUFF(S): 90 AEROSOL, METERED ORAL at 12:21

## 2021-04-28 RX ADMIN — ENOXAPARIN SODIUM 40 MILLIGRAM(S): 100 INJECTION SUBCUTANEOUS at 12:20

## 2021-04-28 RX ADMIN — Medication 10 MILLIGRAM(S): at 12:20

## 2021-04-28 RX ADMIN — PIPERACILLIN AND TAZOBACTAM 25 GRAM(S): 4; .5 INJECTION, POWDER, LYOPHILIZED, FOR SOLUTION INTRAVENOUS at 06:10

## 2021-04-28 RX ADMIN — Medication 650 MILLIGRAM(S): at 21:41

## 2021-04-28 RX ADMIN — Medication 650 MILLIGRAM(S): at 08:30

## 2021-04-28 RX ADMIN — INSULIN GLARGINE 12 UNIT(S): 100 INJECTION, SOLUTION SUBCUTANEOUS at 21:41

## 2021-04-28 RX ADMIN — Medication 1 PUFF(S): at 18:35

## 2021-04-28 RX ADMIN — Medication 4 UNIT(S): at 18:35

## 2021-04-28 RX ADMIN — LOSARTAN POTASSIUM 100 MILLIGRAM(S): 100 TABLET, FILM COATED ORAL at 06:10

## 2021-04-28 RX ADMIN — Medication 650 MILLIGRAM(S): at 23:35

## 2021-04-28 NOTE — PROGRESS NOTE ADULT - PROBLEM SELECTOR PLAN 2
- Acute hypoxemic respiratory failure 2/2 COVID 19 PNA   - continuous pulse oximetry, wean off supplemental oxygen as tolerated  - still requiring 3L NC currently, Increased HR with palpitations  -home o2 delivered  -remains tachycardic and febrile, appreciate ID recs

## 2021-04-28 NOTE — PROGRESS NOTE ADULT - PROBLEM SELECTOR PLAN 4
-A1c 7.8  -Hold home oral hypoglycemic agents ( Metformin)  -Monitor blood sugars qAC and qHS  -Lispro low insulin sliding scale  -DASH and Consistent carbohydrate diet  -Insulin decreased again on 4/28

## 2021-04-28 NOTE — PROGRESS NOTE ADULT - PROBLEM SELECTOR PLAN 3
- Repeat CT Angio chest w/ IV contrast-  no pulmonary embolus.   - Extensive opacities are noted throughout both lungs, c/w Covid pneumonia.  -completed remdesivir and dexamethasone - Repeat CT Angio chest w/ IV contrast-  no pulmonary embolus.   -completed remdesivir and dexamethasone

## 2021-04-28 NOTE — PROGRESS NOTE ADULT - PROBLEM SELECTOR PLAN 1
-CT Chest without evidence of Pulmonary embolism  -Will continue zosyn, today d2,   -ID follwing  -cultures thus far negative -CT Chest without evidence of Pulmonary embolism, f/u official report  -Will continue zosyn, today d2,   -ID following  -cultures thus far negative

## 2021-04-28 NOTE — PROGRESS NOTE ADULT - SUBJECTIVE AND OBJECTIVE BOX
Patient is a 60y old  Female who presents with a chief complaint of shortness of breath (28 Apr 2021 10:43)    Being followed by ID for fever, COVID, hypoxia    Interval history:on 4 L NC  minimal cough   No urinary complaints   No acute events      ROS:  NoSOB,CP  No N/V/D./abd pain  No other complaints      Antimicrobials:    piperacillin/tazobactam IVPB.. 3.375 Gram(s) IV Intermittent every 8 hours    Other medications reviewed  MEDICATIONS  (STANDING):  ALBUTerol    90 MICROgram(s) HFA Inhaler 1 Puff(s) Inhalation every 4 hours  ALBUTerol    90 MICROgram(s) HFA Inhaler 2 Puff(s) Inhalation every 6 hours  aspirin enteric coated 81 milliGRAM(s) Oral daily  dextrose 40% Gel 15 Gram(s) Oral once  dextrose 5%. 1000 milliLiter(s) (50 mL/Hr) IV Continuous <Continuous>  dextrose 5%. 1000 milliLiter(s) (100 mL/Hr) IV Continuous <Continuous>  dextrose 50% Injectable 25 Gram(s) IV Push once  dextrose 50% Injectable 12.5 Gram(s) IV Push once  dextrose 50% Injectable 25 Gram(s) IV Push once  enoxaparin Injectable 40 milliGRAM(s) SubCutaneous daily  glucagon  Injectable 1 milliGRAM(s) IntraMuscular once  insulin glargine Injectable (LANTUS) 12 Unit(s) SubCutaneous at bedtime  insulin lispro (ADMELOG) corrective regimen sliding scale   SubCutaneous three times a day before meals  insulin lispro (ADMELOG) corrective regimen sliding scale   SubCutaneous at bedtime  insulin lispro Injectable (ADMELOG) 4 Unit(s) SubCutaneous three times a day before meals  ipratropium 17 MICROgram(s) HFA Inhaler 1 Puff(s) Inhalation every 6 hours  losartan 100 milliGRAM(s) Oral daily  PARoxetine 10 milliGRAM(s) Oral daily  piperacillin/tazobactam IVPB.. 3.375 Gram(s) IV Intermittent every 8 hours  polyethylene glycol 3350 17 Gram(s) Oral daily  senna 2 Tablet(s) Oral at bedtime      Vital Signs Last 24 Hrs  T(C): 36.8 (04-28-21 @ 06:09), Max: 38.3 (04-27-21 @ 16:16)  T(F): 98.2 (04-28-21 @ 06:09), Max: 101 (04-27-21 @ 16:16)  HR: 125 (04-28-21 @ 06:09) (109 - 125)  BP: 134/88 (04-28-21 @ 06:09) (132/79 - 144/88)  BP(mean): --  RR: 20 (04-28-21 @ 06:09) (20 - 20)  SpO2: 98% (04-28-21 @ 06:09) (98% - 99%)    Physical Exam:        HEENT PERRLA EOMI    No oral exudate or erythema    Chest Good AE,crackles/rhonchi    CVS RRR S1 S2 WNl No murmur or rub or gallop    Abd soft BS normal No tenderness no masses    IV site no erythema tenderness or discharge    CNS AAO X 3 no focal    Lab Data:                          11.1   9.31  )-----------( 417      ( 28 Apr 2021 07:45 )             34.4       04-28    140  |  105  |  11  ----------------------------<  106<H>  4.3   |  25  |  0.78    Ca    9.1      28 Apr 2021 07:45  Phos  3.0     04-28  Mg     2.2     04-28          Culture - Blood (collected 26 Apr 2021 20:50)  Source: .Blood Blood  Preliminary Report (27 Apr 2021 21:00):    No growth to date.    Culture - Blood (collected 26 Apr 2021 20:49)  Source: .Blood Blood-Peripheral  Preliminary Report (27 Apr 2021 21:00):    No growth to date.    Culture - Blood (collected 24 Apr 2021 01:56)  Source: .Blood Blood-Venous  Preliminary Report (25 Apr 2021 02:01):    No growth to date.    Culture - Blood (collected 24 Apr 2021 01:56)  Source: .Blood Blood-Peripheral  Preliminary Report (25 Apr 2021 02:01):    No growth to date.    < from: CT Angio Chest w/ IV Cont (04.27.21 @ 19:27) >  IMPRESSION:  No pulmonary embolism.    Redemonstrated bilateral groundglass opacities and consolidations with interval increase in involvement of the bilateral upper lobes. Findings are consistent with patient's history of atypical/COVID infection.    Pneumomediastinum.        < end of copied text >        C-Reactive Protein, Serum (04.28.21 @ 07:45)   C-Reactive Protein, Serum: 143.1 mg/L   C-Reactive Protein, Serum (04.25.21 @ 06:54)   C-Reactive Protein, Serum: 106.9 mg/L   C-Reactive Protein, Serum (04.22.21 @ 06:50)   C-Reactive Protein, Serum: 9.1 mg/L   C-Reactive Protein, Serum (04.19.21 @ 08:50)   C-Reactive Protein, Serum: 13.0 mg/L   C-Reactive Protein, Serum (04.16.21 @ 06:54)   C-Reactive Protein, Serum: 53.2 mg/L   C-Reactive Protein, Serum (04.13.21 @ 09:44)   C-Reactive Protein, Serum: 185.5 mg/L Procalcitonin, Serum (04.24.21 @ 19:55)   Procalcitonin, Serum: 0.12

## 2021-04-28 NOTE — PROGRESS NOTE ADULT - SUBJECTIVE AND OBJECTIVE BOX
Medicine Progress Note    Patient is a 60y old  Female who presents with a chief complaint of shortness of breath (2021 11:54)      SUBJECTIVE / OVERNIGHT EVENTS: remains on o2, tachycardic, appears weak    ADDITIONAL REVIEW OF SYSTEMS:    MEDICATIONS  (STANDING):  ALBUTerol    90 MICROgram(s) HFA Inhaler 1 Puff(s) Inhalation every 4 hours  ALBUTerol    90 MICROgram(s) HFA Inhaler 2 Puff(s) Inhalation every 6 hours  aspirin enteric coated 81 milliGRAM(s) Oral daily  dextrose 40% Gel 15 Gram(s) Oral once  dextrose 5%. 1000 milliLiter(s) (50 mL/Hr) IV Continuous <Continuous>  dextrose 5%. 1000 milliLiter(s) (100 mL/Hr) IV Continuous <Continuous>  dextrose 50% Injectable 25 Gram(s) IV Push once  dextrose 50% Injectable 12.5 Gram(s) IV Push once  dextrose 50% Injectable 25 Gram(s) IV Push once  enoxaparin Injectable 40 milliGRAM(s) SubCutaneous daily  glucagon  Injectable 1 milliGRAM(s) IntraMuscular once  insulin glargine Injectable (LANTUS) 12 Unit(s) SubCutaneous at bedtime  insulin lispro (ADMELOG) corrective regimen sliding scale   SubCutaneous three times a day before meals  insulin lispro (ADMELOG) corrective regimen sliding scale   SubCutaneous at bedtime  insulin lispro Injectable (ADMELOG) 4 Unit(s) SubCutaneous three times a day before meals  ipratropium 17 MICROgram(s) HFA Inhaler 1 Puff(s) Inhalation every 6 hours  losartan 100 milliGRAM(s) Oral daily  PARoxetine 10 milliGRAM(s) Oral daily  piperacillin/tazobactam IVPB.. 3.375 Gram(s) IV Intermittent every 8 hours,  D2  polyethylene glycol 3350 17 Gram(s) Oral daily  senna 2 Tablet(s) Oral at bedtime    MEDICATIONS  (PRN):  acetaminophen   Tablet .. 650 milliGRAM(s) Oral every 6 hours PRN Temp greater or equal to 38C (100.4F), Mild Pain (1 - 3), Moderate Pain (4 - 6)  aluminum hydroxide/magnesium hydroxide/simethicone Suspension 30 milliLiter(s) Oral every 4 hours PRN Dyspepsia  benzonatate 100 milliGRAM(s) Oral every 8 hours PRN Cough  sodium chloride 0.65% Nasal 1 Spray(s) Both Nostrils two times a day PRN Congestion    CAPILLARY BLOOD GLUCOSE      POCT Blood Glucose.: 145 mg/dL (2021 09:05)  POCT Blood Glucose.: 205 mg/dL (2021 21:14)  POCT Blood Glucose.: 153 mg/dL (2021 17:33)  POCT Blood Glucose.: 134 mg/dL (2021 16:07)    I&O's Summary      PHYSICAL EXAM:  Vital Signs Last 24 Hrs  T(C): 36.8 (2021 06:09), Max: 38.3 (2021 16:16)  T(F): 98.2 (2021 06:09), Max: 101 (2021 16:16)  HR: 125 (2021 06:09) (109 - 125)  BP: 134/88 (2021 06:09) (132/79 - 144/88)  BP(mean): --  RR: 20 (2021 06:09) (20 - 20)  SpO2: 98% (2021 06:09) (98% - 99%)  CONSTITUTIONAL: NAD  ENMT: Moist oral mucosa, no pharyngeal injection or exudates; normal dentition  RESPIRATORY: coarse bs b/l  CARDIOVASCULAR: tachycardic, +s1s2  ABDOMEN: Nontender to palpation, normoactive bowel sounds, no rebound/guarding; No hepatosplenomegaly  PSYCH: A+O to person, place, and time; affect appropriate  NEUROLOGY: CN 2-12 are intact and symmetric; no gross sensory deficits   SKIN: No rashes; no palpable lesions    LABS:                        11.1   9.31  )-----------( 417      ( 2021 07:45 )             34.4     04-28    140  |  105  |  11  ----------------------------<  106<H>  4.3   |  25  |  0.78    Ca    9.1      2021 07:45  Phos  3.0     04-28  Mg     2.2     04-28            Urinalysis Basic - ( 2021 18:28 )    Color: Light Yellow / Appearance: Clear / S.014 / pH: x  Gluc: x / Ketone: Negative  / Bili: Negative / Urobili: <2 mg/dL   Blood: x / Protein: Trace / Nitrite: Negative   Leuk Esterase: Negative / RBC: x / WBC x   Sq Epi: x / Non Sq Epi: x / Bacteria: x        Culture - Blood (collected 2021 20:50)  Source: .Blood Blood  Preliminary Report (2021 21:00):    No growth to date.    Culture - Blood (collected 2021 20:49)  Source: .Blood Blood-Peripheral  Preliminary Report (2021 21:00):    No growth to date.      COVID-19 PCR: Detected (2021 10:15)      RADIOLOGY & ADDITIONAL TESTS:  Imaging from Last 24 Hours: CT chest without PE    Electrocardiogram/QTc Interval:    COORDINATION OF CARE:  Care Discussed with Consultants/Other Providers:

## 2021-04-29 LAB
CULTURE RESULTS: SIGNIFICANT CHANGE UP
CULTURE RESULTS: SIGNIFICANT CHANGE UP
SPECIMEN SOURCE: SIGNIFICANT CHANGE UP
SPECIMEN SOURCE: SIGNIFICANT CHANGE UP

## 2021-04-29 PROCEDURE — 99232 SBSQ HOSP IP/OBS MODERATE 35: CPT

## 2021-04-29 PROCEDURE — 99233 SBSQ HOSP IP/OBS HIGH 50: CPT

## 2021-04-29 RX ORDER — METOPROLOL TARTRATE 50 MG
2.5 TABLET ORAL ONCE
Refills: 0 | Status: COMPLETED | OUTPATIENT
Start: 2021-04-29 | End: 2021-04-29

## 2021-04-29 RX ORDER — ALPRAZOLAM 0.25 MG
0.5 TABLET ORAL ONCE
Refills: 0 | Status: DISCONTINUED | OUTPATIENT
Start: 2021-04-29 | End: 2021-04-29

## 2021-04-29 RX ORDER — LORATADINE 10 MG/1
10 TABLET ORAL DAILY
Refills: 0 | Status: DISCONTINUED | OUTPATIENT
Start: 2021-04-29 | End: 2021-05-06

## 2021-04-29 RX ADMIN — Medication 1 PUFF(S): at 16:00

## 2021-04-29 RX ADMIN — Medication 1 PUFF(S): at 09:04

## 2021-04-29 RX ADMIN — Medication 81 MILLIGRAM(S): at 11:14

## 2021-04-29 RX ADMIN — Medication 650 MILLIGRAM(S): at 22:57

## 2021-04-29 RX ADMIN — ENOXAPARIN SODIUM 40 MILLIGRAM(S): 100 INJECTION SUBCUTANEOUS at 11:14

## 2021-04-29 RX ADMIN — Medication 30 MILLILITER(S): at 11:51

## 2021-04-29 RX ADMIN — ALBUTEROL 2 PUFF(S): 90 AEROSOL, METERED ORAL at 05:55

## 2021-04-29 RX ADMIN — POLYETHYLENE GLYCOL 3350 17 GRAM(S): 17 POWDER, FOR SOLUTION ORAL at 11:15

## 2021-04-29 RX ADMIN — Medication 1 PUFF(S): at 05:55

## 2021-04-29 RX ADMIN — Medication 2.5 MILLIGRAM(S): at 14:58

## 2021-04-29 RX ADMIN — Medication 1 PUFF(S): at 22:01

## 2021-04-29 RX ADMIN — Medication 650 MILLIGRAM(S): at 11:15

## 2021-04-29 RX ADMIN — ALBUTEROL 2 PUFF(S): 90 AEROSOL, METERED ORAL at 16:00

## 2021-04-29 RX ADMIN — PIPERACILLIN AND TAZOBACTAM 25 GRAM(S): 4; .5 INJECTION, POWDER, LYOPHILIZED, FOR SOLUTION INTRAVENOUS at 05:41

## 2021-04-29 RX ADMIN — ALBUTEROL 2 PUFF(S): 90 AEROSOL, METERED ORAL at 22:02

## 2021-04-29 RX ADMIN — Medication 4 UNIT(S): at 09:05

## 2021-04-29 RX ADMIN — Medication 4: at 18:41

## 2021-04-29 RX ADMIN — Medication 0.5 MILLIGRAM(S): at 14:58

## 2021-04-29 RX ADMIN — Medication 4 UNIT(S): at 13:28

## 2021-04-29 RX ADMIN — Medication 10 MILLIGRAM(S): at 11:14

## 2021-04-29 RX ADMIN — INSULIN GLARGINE 12 UNIT(S): 100 INJECTION, SOLUTION SUBCUTANEOUS at 22:01

## 2021-04-29 RX ADMIN — Medication 4 UNIT(S): at 18:41

## 2021-04-29 RX ADMIN — ALBUTEROL 2 PUFF(S): 90 AEROSOL, METERED ORAL at 09:04

## 2021-04-29 RX ADMIN — Medication 650 MILLIGRAM(S): at 12:00

## 2021-04-29 NOTE — PROGRESS NOTE ADULT - PROBLEM SELECTOR PLAN 1
remains febrile, likely secondary to COVID  appreciate ID input, no evidence of secondary bacterial infection, will dc zosyn  cultures negative remains febrile, likely secondary to COVID  appreciate ID input, no evidence of secondary bacterial infection, will dc zosyn  cultures negative  case d/w Dr. Mcknight

## 2021-04-29 NOTE — PROGRESS NOTE ADULT - SUBJECTIVE AND OBJECTIVE BOX
Medicine Progress Note    Patient is a 60y old  Female who presents with a chief complaint of shortness of breath (29 Apr 2021 13:27)      SUBJECTIVE / OVERNIGHT EVENTS: remains tachycardic, feels weak    ADDITIONAL REVIEW OF SYSTEMS:    MEDICATIONS  (STANDING):  ALBUTerol    90 MICROgram(s) HFA Inhaler 1 Puff(s) Inhalation every 4 hours  ALBUTerol    90 MICROgram(s) HFA Inhaler 2 Puff(s) Inhalation every 6 hours  aspirin enteric coated 81 milliGRAM(s) Oral daily  dextrose 40% Gel 15 Gram(s) Oral once  dextrose 5%. 1000 milliLiter(s) (50 mL/Hr) IV Continuous <Continuous>  dextrose 5%. 1000 milliLiter(s) (100 mL/Hr) IV Continuous <Continuous>  dextrose 50% Injectable 25 Gram(s) IV Push once  dextrose 50% Injectable 12.5 Gram(s) IV Push once  dextrose 50% Injectable 25 Gram(s) IV Push once  enoxaparin Injectable 40 milliGRAM(s) SubCutaneous daily  glucagon  Injectable 1 milliGRAM(s) IntraMuscular once  insulin glargine Injectable (LANTUS) 12 Unit(s) SubCutaneous at bedtime  insulin lispro (ADMELOG) corrective regimen sliding scale   SubCutaneous three times a day before meals  insulin lispro (ADMELOG) corrective regimen sliding scale   SubCutaneous at bedtime  insulin lispro Injectable (ADMELOG) 4 Unit(s) SubCutaneous three times a day before meals  ipratropium 17 MICROgram(s) HFA Inhaler 1 Puff(s) Inhalation every 6 hours  losartan 100 milliGRAM(s) Oral daily  PARoxetine 10 milliGRAM(s) Oral daily  polyethylene glycol 3350 17 Gram(s) Oral daily  senna 2 Tablet(s) Oral at bedtime    MEDICATIONS  (PRN):  acetaminophen   Tablet .. 650 milliGRAM(s) Oral every 6 hours PRN Temp greater or equal to 38C (100.4F), Mild Pain (1 - 3), Moderate Pain (4 - 6)  aluminum hydroxide/magnesium hydroxide/simethicone Suspension 30 milliLiter(s) Oral every 4 hours PRN Dyspepsia  benzonatate 100 milliGRAM(s) Oral every 8 hours PRN Cough  sodium chloride 0.65% Nasal 1 Spray(s) Both Nostrils two times a day PRN Congestion    CAPILLARY BLOOD GLUCOSE      POCT Blood Glucose.: 122 mg/dL (29 Apr 2021 13:10)  POCT Blood Glucose.: 118 mg/dL (29 Apr 2021 08:56)  POCT Blood Glucose.: 203 mg/dL (28 Apr 2021 21:14)  POCT Blood Glucose.: 150 mg/dL (28 Apr 2021 18:15)    I&O's Summary      PHYSICAL EXAM:  Vital Signs Last 24 Hrs  T(C): 37.3 (29 Apr 2021 09:05), Max: 38.2 (28 Apr 2021 21:28)  T(F): 99.1 (29 Apr 2021 09:05), Max: 100.7 (28 Apr 2021 21:28)  HR: 124 (29 Apr 2021 09:05) (107 - 131)  BP: 135/88 (29 Apr 2021 09:05) (110/79 - 135/88)  BP(mean): --  RR: 18 (29 Apr 2021 09:05) (16 - 20)  SpO2: 93% (29 Apr 2021 09:05) (92% - 97%)  CONSTITUTIONAL: NAD, appears weak  ENMT: Moist oral mucosa, no pharyngeal injection or exudates; normal dentition  RESPIRATORY: coarse breath sounds b/l  CARDIOVASCULAR: tachycardic  ABDOMEN: Nontender to palpation, normoactive bowel sounds, no rebound/guarding; No hepatosplenomegaly  PSYCH: A+O to person, place, and time; affect appropriate  NEUROLOGY: CN 2-12 are intact and symmetric; no gross sensory deficits   SKIN: No rashes; no palpable lesions    LABS:                        11.1   9.31  )-----------( 417      ( 28 Apr 2021 07:45 )             34.4     04-28    140  |  105  |  11  ----------------------------<  106<H>  4.3   |  25  |  0.78    Ca    9.1      28 Apr 2021 07:45  Phos  3.0     04-28  Mg     2.2     04-28                Culture - Blood (collected 26 Apr 2021 20:50)  Source: .Blood Blood  Preliminary Report (27 Apr 2021 21:00):    No growth to date.    Culture - Blood (collected 26 Apr 2021 20:49)  Source: .Blood Blood-Peripheral  Preliminary Report (27 Apr 2021 21:00):    No growth to date.      COVID-19 PCR: Detected (13 Apr 2021 10:15)      RADIOLOGY & ADDITIONAL TESTS:  Imaging from Last 24 Hours:    Electrocardiogram/QTc Interval:    COORDINATION OF CARE:  Care Discussed with Consultants/Other Providers:

## 2021-04-29 NOTE — PROGRESS NOTE ADULT - SUBJECTIVE AND OBJECTIVE BOX
Patient is a 60y old  Female who presents with a chief complaint of shortness of breath (28 Apr 2021 12:06)    Being followed by ID for COVID, fever    Interval history:still on 3 L NC   some cough   No acute events      ROS:  No,CP  No N/V/D./abd pain  No other complaints      Antimicrobials:  zosyn Dced today       Other medications reviewed  MEDICATIONS  (STANDING):  ALBUTerol    90 MICROgram(s) HFA Inhaler 1 Puff(s) Inhalation every 4 hours  ALBUTerol    90 MICROgram(s) HFA Inhaler 2 Puff(s) Inhalation every 6 hours  aspirin enteric coated 81 milliGRAM(s) Oral daily  dextrose 40% Gel 15 Gram(s) Oral once  dextrose 5%. 1000 milliLiter(s) (50 mL/Hr) IV Continuous <Continuous>  dextrose 5%. 1000 milliLiter(s) (100 mL/Hr) IV Continuous <Continuous>  dextrose 50% Injectable 25 Gram(s) IV Push once  dextrose 50% Injectable 12.5 Gram(s) IV Push once  dextrose 50% Injectable 25 Gram(s) IV Push once  enoxaparin Injectable 40 milliGRAM(s) SubCutaneous daily  glucagon  Injectable 1 milliGRAM(s) IntraMuscular once  insulin glargine Injectable (LANTUS) 12 Unit(s) SubCutaneous at bedtime  insulin lispro (ADMELOG) corrective regimen sliding scale   SubCutaneous at bedtime  insulin lispro (ADMELOG) corrective regimen sliding scale   SubCutaneous three times a day before meals  insulin lispro Injectable (ADMELOG) 4 Unit(s) SubCutaneous three times a day before meals  ipratropium 17 MICROgram(s) HFA Inhaler 1 Puff(s) Inhalation every 6 hours  losartan 100 milliGRAM(s) Oral daily  PARoxetine 10 milliGRAM(s) Oral daily  polyethylene glycol 3350 17 Gram(s) Oral daily  senna 2 Tablet(s) Oral at bedtime      Vital Signs Last 24 Hrs  T(C): 37.3 (04-29-21 @ 09:05), Max: 38.2 (04-28-21 @ 21:28)  T(F): 99.1 (04-29-21 @ 09:05), Max: 100.7 (04-28-21 @ 21:28)  HR: 124 (04-29-21 @ 09:05) (107 - 131)  BP: 135/88 (04-29-21 @ 09:05) (110/79 - 135/88)  BP(mean): --  RR: 18 (04-29-21 @ 09:05) (16 - 20)  SpO2: 93% (04-29-21 @ 09:05) (92% - 97%)    Physical Exam:      HEENT PERRLA EOMI    No oral exudate or erythema    Chest Good AE,crackles/rhonchi    CVS RRR S1 S2 WNl No murmur or rub or gallop    Abd soft BS normal No tenderness no masses    IV site no erythema tenderness or discharge    CNS AAO X 3 no focalLab Data:                          11.1   9.31  )-----------( 417      ( 28 Apr 2021 07:45 )             34.4       04-28    140  |  105  |  11  ----------------------------<  106<H>  4.3   |  25  |  0.78    Ca    9.1      28 Apr 2021 07:45  Phos  3.0     04-28  Mg     2.2     04-28          Culture - Blood (collected 26 Apr 2021 20:50)  Source: .Blood Blood  Preliminary Report (27 Apr 2021 21:00):    No growth to date.    Culture - Blood (collected 26 Apr 2021 20:49)  Source: .Blood Blood-Peripheral  Preliminary Report (27 Apr 2021 21:00):    No growth to date.        < from: CT Angio Chest w/ IV Cont (04.27.21 @ 19:27) >  IMPRESSION:  No pulmonary embolism.    Redemonstrated bilateral groundglass opacities and consolidations with interval increase in involvement of the bilateral upper lobes. Findings are consistent with patient's history of atypical/COVID infection.    Pneumomediastinum.      < end of copied text >

## 2021-04-29 NOTE — PROGRESS NOTE ADULT - PROBLEM SELECTOR PLAN 4
-A1c 7.8  -Hold home oral hypoglycemic agents ( Metformin)  -Monitor blood sugars qAC and qHS  -Lispro low insulin sliding scale  -DASH and Consistent carbohydrate diet  -Insulin decreased on 4/28

## 2021-04-29 NOTE — PROGRESS NOTE ADULT - PROBLEM SELECTOR PLAN 3
- Repeat CT Angio chest w/ IV contrast-  no pulmonary embolus, on going multifocal pneumonia due to covid  -completed remdesivir and dexamethasone

## 2021-04-29 NOTE — CONSULT NOTE ADULT - SUBJECTIVE AND OBJECTIVE BOX
DATE OF SERVICE: 04-30-21 @ 00:39    CHIEF COMPLAINT:Patient is a 60y old  Female who presents with a chief complaint of shortness of breath (29 Apr 2021 13:31)      HISTORY OF PRESENT ILLNESS:HPI:  60 year old female with PMhx of T2DM (on Metformin), HTN and depression presents with worsening shortness of breath and fatigue.  Patient reports she tested COVID 19 positive 4 days ago, and now with worsening of breath associated with pleuritic chest pain and dry cough.  She reports fatigue and body aches associated with poor po intake. She episodes of fever and chills, improved/ resolves with Tylenol.  She denies any nausea, vomiting or diarrhea. Patient had recent ill exposure to her son. Patient was BIBEMs, found hypoxic to 80s, given 4L NC on transit. In ED, patient received decadron 6mg IVP, albuterol and 1L of LR.     On exam, patient tachypneic RR22-24 and tachycardic 105-115 bpm, with 02 saturation of 98% on 5L NC.  (13 Apr 2021 16:28)    Long hospital course now for over 2 weeks.     PAST MEDICAL & SURGICAL HISTORY:  Diabetes mellitus    HTN (hypertension)    Depression    No significant past surgical history            MEDICATIONS:  aspirin enteric coated 81 milliGRAM(s) Oral daily  enoxaparin Injectable 40 milliGRAM(s) SubCutaneous daily  losartan 100 milliGRAM(s) Oral daily      ALBUTerol    90 MICROgram(s) HFA Inhaler 1 Puff(s) Inhalation every 4 hours  ALBUTerol    90 MICROgram(s) HFA Inhaler 2 Puff(s) Inhalation every 6 hours  benzonatate 100 milliGRAM(s) Oral every 8 hours PRN  ipratropium 17 MICROgram(s) HFA Inhaler 1 Puff(s) Inhalation every 6 hours  loratadine 10 milliGRAM(s) Oral daily    acetaminophen   Tablet .. 650 milliGRAM(s) Oral every 6 hours PRN  PARoxetine 10 milliGRAM(s) Oral daily    aluminum hydroxide/magnesium hydroxide/simethicone Suspension 30 milliLiter(s) Oral every 4 hours PRN  polyethylene glycol 3350 17 Gram(s) Oral daily  senna 2 Tablet(s) Oral at bedtime    dextrose 40% Gel 15 Gram(s) Oral once  dextrose 50% Injectable 25 Gram(s) IV Push once  dextrose 50% Injectable 12.5 Gram(s) IV Push once  dextrose 50% Injectable 25 Gram(s) IV Push once  glucagon  Injectable 1 milliGRAM(s) IntraMuscular once  insulin glargine Injectable (LANTUS) 12 Unit(s) SubCutaneous at bedtime  insulin lispro (ADMELOG) corrective regimen sliding scale   SubCutaneous at bedtime  insulin lispro (ADMELOG) corrective regimen sliding scale   SubCutaneous three times a day before meals  insulin lispro Injectable (ADMELOG) 4 Unit(s) SubCutaneous three times a day before meals    dextrose 5%. 1000 milliLiter(s) IV Continuous <Continuous>  dextrose 5%. 1000 milliLiter(s) IV Continuous <Continuous>  sodium chloride 0.65% Nasal 1 Spray(s) Both Nostrils two times a day PRN      FAMILY HISTORY:  FH: diabetes mellitus        Non-contributory    SOCIAL HISTORY:    not a smoker    Allergies    erythromycin topical (Unknown)  Keflex (Unknown)    Intolerances    	    REVIEW OF SYSTEMS:  CONSTITUTIONAL: No fever  EYES: No eye pain, visual disturbances, or discharge  ENMT:  No difficulty hearing, tinnitus  NECK: No pain or stiffness  RESPIRATORY: + cough, wheezing, SOB  CARDIOVASCULAR: No chest pain, palpitations, passing out, dizziness, or leg swelling  GASTROINTESTINAL:  No nausea, vomiting, diarrhea or constipation. No melena.  GENITOURINARY: No dysuria, hematuria  NEUROLOGICAL: No stroke like symptoms  SKIN: No burning or lesions   ENDOCRINE: No heat or cold intolerance  MUSCULOSKELETAL: No joint pain or swelling  PSYCHIATRIC: No  anxiety, mood swings  HEME/LYMPH: No bleeding gums  ALLERGY AND IMMUNOLOGIC: No hives or eczema	    All other ROS negative    PHYSICAL EXAM:  T(C): 38.1 (04-30-21 @ 00:14), Max: 38.3 (04-29-21 @ 22:38)  HR: 119 (04-30-21 @ 00:14) (115 - 130)  BP: 117/78 (04-29-21 @ 21:57) (116/76 - 138/77)  RR: 18 (04-29-21 @ 21:57) (16 - 22)  SpO2: 97% (04-29-21 @ 21:57) (86% - 99%)  Wt(kg): --  I&O's Summary    29 Apr 2021 07:01  -  30 Apr 2021 00:39  --------------------------------------------------------  IN: 400 mL / OUT: 400 mL / NET: 0 mL        Appearance: Normal	  HEENT:   Normal oral mucosa, EOMI	  Cardiovascular:  S1 S2, No JVD,    Respiratory: Lungs clear to auscultation	  Psychiatry: Alert  Gastrointestinal:  Soft, Non-tender, + BS	  Skin: No rashes   Neurologic: Non-focal  Extremities:  No edema  Vascular: Peripheral pulses palpable    	    	  	  CARDIAC MARKERS:  Labs personally reviewed by me                                  11.1   9.31  )-----------( 417      ( 28 Apr 2021 07:45 )             34.4     04-28    140  |  105  |  11  ----------------------------<  106<H>  4.3   |  25  |  0.78    Ca    9.1      28 Apr 2021 07:45  Phos  3.0     04-28  Mg     2.2     04-28            EKG: Personally reviewed by me - Sinus tach, inferior wall TWI  Radiology: Personally reviewed by me -    from: CT Angio Chest w/ IV Cont (04.27.21 @ 19:27) >  IMPRESSION:  No pulmonary embolism.    Redemonstrated bilateral groundglass opacities and consolidations with interval increase in involvement of the bilateral upper lobes. Findings are consistent with patient's history of atypical/COVID infection.    Pneumomediastinum.      Assessment and Plan:   · Assessment	  60 year old female with T2DM A1c 7.8 (on Metformin), HTN and depression admitted to medicine for acute respiratory failure with hypoxia and sepsis 2/2 COVID 19.     Problem/Plan - 1:  ·  Problem: COVID-19.  Plan: - Repeat CT Angio chest w/ IV contrast-  no pulmonary embolus, on going multifocal pneumonia due to covid  -completed remdesivir and dexamethasone.   - still hypoxic requiring 3L oxygen  - Small pneumomediastinum - consider pulmonology evdiamond Richards/Jolie for outpt follow up as well    Problem/Plan - 2:  ·  Problem: tachycardia   Plan: tachycardiac upto 130bpm  - troponin and echo to r/o COVID myocarditis     Problem/Plan - 3:  ·  Problem: Type 2 diabetes mellitus without complication, unspecified whether long term insulin use.  Plan: -A1c 7.8  -Lispro low insulin sliding scale  -DASH and Consistent carbohydrate diet    Problem/Plan - 4:  ·  Problem: Hypertension, unspecified type.  Plan: -Continue home medications: Losartan 100mg qD.     Problem/Plan - 5:  Problem: Hyperlipidemia, unspecified hyperlipidemia type. Plan: - C/w rosuvastatin.    Problem/Plan - 6:  ·  Problem: Need for prophylactic measure.  Plan: -DVT ppx: Lovenox 40mg SQ qD           Differential diagnosis and plan of care discussed with patient after the evaluation. Counseling on diet, nutritional counseling, weight management, exercise and medication compliance was done.   Advanced care planning/advanced directives discussed with patient/family. DNR status including forceful chest compressions to attempt to restart the heart, ventilator support/artificial breathing, electric shock, artificial nutrition, health care proxy, Molst form all discussed with pt. Pt wishes to consider. OMT on six regions for acute somatic dysfunctions done at the bedside. More than fifteen minutes spent on discussing advanced directives.  One hundred ten minutes spent on total encounter, of which more than fifty percent of the encounter was spent counseling and/or coordinating care by the attending physician        Agustín Robertson DO [facility]  Cardiovascular Medicine  [address] [place] [address], [address]  Office [phone]  Cell [phone]

## 2021-04-30 LAB
ANION GAP SERPL CALC-SCNC: 10 MMOL/L — SIGNIFICANT CHANGE UP (ref 7–14)
BUN SERPL-MCNC: 9 MG/DL — SIGNIFICANT CHANGE UP (ref 7–23)
CALCIUM SERPL-MCNC: 9.3 MG/DL — SIGNIFICANT CHANGE UP (ref 8.4–10.5)
CHLORIDE SERPL-SCNC: 104 MMOL/L — SIGNIFICANT CHANGE UP (ref 98–107)
CO2 SERPL-SCNC: 28 MMOL/L — SIGNIFICANT CHANGE UP (ref 22–31)
CREAT SERPL-MCNC: 0.76 MG/DL — SIGNIFICANT CHANGE UP (ref 0.5–1.3)
GLUCOSE SERPL-MCNC: 99 MG/DL — SIGNIFICANT CHANGE UP (ref 70–99)
HCT VFR BLD CALC: 36.9 % — SIGNIFICANT CHANGE UP (ref 34.5–45)
HGB BLD-MCNC: 11.8 G/DL — SIGNIFICANT CHANGE UP (ref 11.5–15.5)
MAGNESIUM SERPL-MCNC: 2.5 MG/DL — SIGNIFICANT CHANGE UP (ref 1.6–2.6)
MCHC RBC-ENTMCNC: 27.6 PG — SIGNIFICANT CHANGE UP (ref 27–34)
MCHC RBC-ENTMCNC: 32 GM/DL — SIGNIFICANT CHANGE UP (ref 32–36)
MCV RBC AUTO: 86.4 FL — SIGNIFICANT CHANGE UP (ref 80–100)
NRBC # BLD: 0 /100 WBCS — SIGNIFICANT CHANGE UP
NRBC # FLD: 0 K/UL — SIGNIFICANT CHANGE UP
PHOSPHATE SERPL-MCNC: 3.1 MG/DL — SIGNIFICANT CHANGE UP (ref 2.5–4.5)
PLATELET # BLD AUTO: 468 K/UL — HIGH (ref 150–400)
POTASSIUM SERPL-MCNC: 4.4 MMOL/L — SIGNIFICANT CHANGE UP (ref 3.5–5.3)
POTASSIUM SERPL-SCNC: 4.4 MMOL/L — SIGNIFICANT CHANGE UP (ref 3.5–5.3)
RBC # BLD: 4.27 M/UL — SIGNIFICANT CHANGE UP (ref 3.8–5.2)
RBC # FLD: 15.8 % — HIGH (ref 10.3–14.5)
SODIUM SERPL-SCNC: 142 MMOL/L — SIGNIFICANT CHANGE UP (ref 135–145)
TROPONIN T, HIGH SENSITIVITY RESULT: <6 NG/L — SIGNIFICANT CHANGE UP
WBC # BLD: 8.51 K/UL — SIGNIFICANT CHANGE UP (ref 3.8–10.5)
WBC # FLD AUTO: 8.51 K/UL — SIGNIFICANT CHANGE UP (ref 3.8–10.5)

## 2021-04-30 PROCEDURE — 99233 SBSQ HOSP IP/OBS HIGH 50: CPT

## 2021-04-30 PROCEDURE — 99223 1ST HOSP IP/OBS HIGH 75: CPT

## 2021-04-30 PROCEDURE — 99232 SBSQ HOSP IP/OBS MODERATE 35: CPT

## 2021-04-30 RX ORDER — METOPROLOL TARTRATE 50 MG
25 TABLET ORAL
Refills: 0 | Status: DISCONTINUED | OUTPATIENT
Start: 2021-04-30 | End: 2021-05-01

## 2021-04-30 RX ADMIN — Medication 4 UNIT(S): at 18:26

## 2021-04-30 RX ADMIN — ALBUTEROL 2 PUFF(S): 90 AEROSOL, METERED ORAL at 03:22

## 2021-04-30 RX ADMIN — Medication 25 MILLIGRAM(S): at 18:25

## 2021-04-30 RX ADMIN — LORATADINE 10 MILLIGRAM(S): 10 TABLET ORAL at 11:21

## 2021-04-30 RX ADMIN — Medication 10 MILLIGRAM(S): at 11:21

## 2021-04-30 RX ADMIN — Medication 81 MILLIGRAM(S): at 11:21

## 2021-04-30 RX ADMIN — ENOXAPARIN SODIUM 40 MILLIGRAM(S): 100 INJECTION SUBCUTANEOUS at 11:21

## 2021-04-30 RX ADMIN — Medication 4 UNIT(S): at 13:23

## 2021-04-30 RX ADMIN — ALBUTEROL 2 PUFF(S): 90 AEROSOL, METERED ORAL at 11:23

## 2021-04-30 RX ADMIN — Medication 1 PUFF(S): at 03:21

## 2021-04-30 RX ADMIN — ALBUTEROL 2 PUFF(S): 90 AEROSOL, METERED ORAL at 21:30

## 2021-04-30 RX ADMIN — Medication 4: at 18:26

## 2021-04-30 RX ADMIN — INSULIN GLARGINE 12 UNIT(S): 100 INJECTION, SOLUTION SUBCUTANEOUS at 21:29

## 2021-04-30 RX ADMIN — Medication 1 PUFF(S): at 11:21

## 2021-04-30 RX ADMIN — Medication 1 PUFF(S): at 18:27

## 2021-04-30 RX ADMIN — Medication 4 UNIT(S): at 09:15

## 2021-04-30 RX ADMIN — ALBUTEROL 2 PUFF(S): 90 AEROSOL, METERED ORAL at 18:28

## 2021-04-30 RX ADMIN — Medication 1 PUFF(S): at 21:31

## 2021-04-30 RX ADMIN — LORATADINE 10 MILLIGRAM(S): 10 TABLET ORAL at 00:15

## 2021-04-30 NOTE — PROGRESS NOTE ADULT - PROBLEM SELECTOR PLAN 5
d/c losartan as pt thinks its contributes to her palpitations/tachycardia  will start metoprolol BID, uptitrate as tolerated  awaiting 2D echo  troponin neg

## 2021-04-30 NOTE — PROGRESS NOTE ADULT - SUBJECTIVE AND OBJECTIVE BOX
Patient is a 60y old  Female who presents with a chief complaint of shortness of breath (30 Apr 2021 13:11)        SUBJECTIVE / OVERNIGHT EVENTS:  no acute events o/n  pt states she feels "ok"   no tachypnea  wants to stop losartan, thinks it causes her palpitations        MEDICATIONS  (STANDING):  ALBUTerol    90 MICROgram(s) HFA Inhaler 1 Puff(s) Inhalation every 4 hours  ALBUTerol    90 MICROgram(s) HFA Inhaler 2 Puff(s) Inhalation every 6 hours  aspirin enteric coated 81 milliGRAM(s) Oral daily  dextrose 40% Gel 15 Gram(s) Oral once  dextrose 5%. 1000 milliLiter(s) (50 mL/Hr) IV Continuous <Continuous>  dextrose 5%. 1000 milliLiter(s) (100 mL/Hr) IV Continuous <Continuous>  dextrose 50% Injectable 25 Gram(s) IV Push once  dextrose 50% Injectable 12.5 Gram(s) IV Push once  dextrose 50% Injectable 25 Gram(s) IV Push once  enoxaparin Injectable 40 milliGRAM(s) SubCutaneous daily  glucagon  Injectable 1 milliGRAM(s) IntraMuscular once  insulin glargine Injectable (LANTUS) 12 Unit(s) SubCutaneous at bedtime  insulin lispro (ADMELOG) corrective regimen sliding scale   SubCutaneous at bedtime  insulin lispro (ADMELOG) corrective regimen sliding scale   SubCutaneous three times a day before meals  insulin lispro Injectable (ADMELOG) 4 Unit(s) SubCutaneous three times a day before meals  ipratropium 17 MICROgram(s) HFA Inhaler 1 Puff(s) Inhalation every 6 hours  loratadine 10 milliGRAM(s) Oral daily  metoprolol tartrate 25 milliGRAM(s) Oral two times a day  PARoxetine 10 milliGRAM(s) Oral daily  polyethylene glycol 3350 17 Gram(s) Oral daily  senna 2 Tablet(s) Oral at bedtime    MEDICATIONS  (PRN):  acetaminophen   Tablet .. 650 milliGRAM(s) Oral every 6 hours PRN Temp greater or equal to 38C (100.4F), Mild Pain (1 - 3), Moderate Pain (4 - 6)  aluminum hydroxide/magnesium hydroxide/simethicone Suspension 30 milliLiter(s) Oral every 4 hours PRN Dyspepsia  benzonatate 100 milliGRAM(s) Oral every 8 hours PRN Cough  sodium chloride 0.65% Nasal 1 Spray(s) Both Nostrils two times a day PRN Congestion      Vital Signs Last 24 Hrs  T(C): 36.9 (30 Apr 2021 13:11), Max: 38.3 (29 Apr 2021 22:38)  T(F): 98.4 (30 Apr 2021 13:11), Max: 100.9 (29 Apr 2021 22:38)  HR: 118 (30 Apr 2021 13:11) (117 - 125)  BP: 116/76 (30 Apr 2021 13:11) (116/76 - 120/84)  BP(mean): --  RR: 18 (30 Apr 2021 13:11) (16 - 22)  SpO2: 97% (30 Apr 2021 13:11) (87% - 99%)  CAPILLARY BLOOD GLUCOSE      POCT Blood Glucose.: 139 mg/dL (30 Apr 2021 13:19)  POCT Blood Glucose.: 103 mg/dL (30 Apr 2021 09:07)  POCT Blood Glucose.: 215 mg/dL (29 Apr 2021 21:21)  POCT Blood Glucose.: 264 mg/dL (29 Apr 2021 18:20)    I&O's Summary    29 Apr 2021 07:01  -  30 Apr 2021 07:00  --------------------------------------------------------  IN: 650 mL / OUT: 400 mL / NET: 250 mL    30 Apr 2021 07:01  -  30 Apr 2021 15:31  --------------------------------------------------------  IN: 290 mL / OUT: 900 mL / NET: -610 mL          PHYSICAL EXAM  GENERAL: NAD, well-developed  HEAD:  Atraumatic, Normocephalic  EYES: EOMI, PERRLA, conjunctiva and sclera clear  NECK: Supple, No JVD  CHEST/LUNG: Clear to auscultation bilaterally; No wheeze  HEART: Regular rate and rhythm; No murmurs, rubs, or gallops  ABDOMEN: Soft, Nontender, Nondistended; Bowel sounds present  EXTREMITIES:  2+ Peripheral Pulses, No clubbing, cyanosis, or edema      LABS:                        11.8   8.51  )-----------( 468      ( 30 Apr 2021 07:31 )             36.9     04-30    142  |  104  |  9   ----------------------------<  99  4.4   |  28  |  0.76    Ca    9.3      30 Apr 2021 07:31  Phos  3.1     04-30  Mg     2.5     04-30                RADIOLOGY & ADDITIONAL TESTS:    Imaging Personally Reviewed:  Consultant(s) Notes Reviewed:  ID, PULM   Care Discussed with Consultants/Other Providers: ID

## 2021-04-30 NOTE — PROGRESS NOTE ADULT - PROBLEM SELECTOR PLAN 1
remains febrile, likely secondary to COVID  appreciate ID input, no evidence of secondary bacterial infection, off abx (received Zosyn x 3 days)   blood cultures remain neg, last 4/26, d/w ID, will repeat blood cultures x 2 tomorrow AM

## 2021-04-30 NOTE — CONSULT NOTE ADULT - SUBJECTIVE AND OBJECTIVE BOX
PULMONARY CONSULT NOTE      BISI AGUILAR  MRN-[id number]    Patient is a 60y old  Female who presents with a chief complaint of shortness of breath (29 Apr 2021 18:38)      HISTORY OF PRESENT ILLNESS:  61 yo female with DM, HTN, HLD depression admitted on 4/13 for fatigue secondary to covid 19.   Hospital course: NC, s/p remdesivir, s/p dexamethasone   intermittent febrile episodes  past 72 hours -seen by ID and cardio      today- nasal cannula not in place- shes 100%. winded with minimal activity  usign IS- 500cc        Allergies    erythromycin topical (Unknown)  Keflex (Unknown)    Intolerances        PAST MEDICAL & SURGICAL HISTORY:  Diabetes mellitus    HTN (hypertension)    Depression    No significant past surgical history            FAMILY HISTORY:  FH: diabetes mellitus      Prescriptions:      SOCIAL HISTORY  Smoking History: denies     REVIEW OF SYSTEMS:   slight cough  dyspnea with simple activities  post nasal drip  URI      Vital Signs Last 24 Hrs  T(C): 36.3 (30 Apr 2021 07:11), Max: 38.3 (29 Apr 2021 22:38)  T(F): 97.3 (30 Apr 2021 07:11), Max: 100.9 (29 Apr 2021 22:38)  HR: 117 (30 Apr 2021 07:11) (117 - 130)  BP: 120/84 (30 Apr 2021 07:11) (116/76 - 138/77)  BP(mean): --  RR: 16 (30 Apr 2021 07:11) (16 - 22)  SpO2: 96% (30 Apr 2021 07:11) (86% - 99%)    PHYSICAL EXAMINATION:    GENERAL: The patient is a well-developed, well-nourished female on 4L  winded   Head is normocephalic and atraumatic.  LUNGS: Respirations unlabored    HEART:tachy      NEUROLOGIC: Grossly intact      MEDICATIONS  (STANDING):  ALBUTerol    90 MICROgram(s) HFA Inhaler 1 Puff(s) Inhalation every 4 hours  ALBUTerol    90 MICROgram(s) HFA Inhaler 2 Puff(s) Inhalation every 6 hours  aspirin enteric coated 81 milliGRAM(s) Oral daily  dextrose 40% Gel 15 Gram(s) Oral once  dextrose 5%. 1000 milliLiter(s) (50 mL/Hr) IV Continuous <Continuous>  dextrose 5%. 1000 milliLiter(s) (100 mL/Hr) IV Continuous <Continuous>  dextrose 50% Injectable 25 Gram(s) IV Push once  dextrose 50% Injectable 12.5 Gram(s) IV Push once  dextrose 50% Injectable 25 Gram(s) IV Push once  enoxaparin Injectable 40 milliGRAM(s) SubCutaneous daily  glucagon  Injectable 1 milliGRAM(s) IntraMuscular once  insulin glargine Injectable (LANTUS) 12 Unit(s) SubCutaneous at bedtime  insulin lispro (ADMELOG) corrective regimen sliding scale   SubCutaneous three times a day before meals  insulin lispro (ADMELOG) corrective regimen sliding scale   SubCutaneous at bedtime  insulin lispro Injectable (ADMELOG) 4 Unit(s) SubCutaneous three times a day before meals  ipratropium 17 MICROgram(s) HFA Inhaler 1 Puff(s) Inhalation every 6 hours  loratadine 10 milliGRAM(s) Oral daily  metoprolol tartrate 25 milliGRAM(s) Oral two times a day  PARoxetine 10 milliGRAM(s) Oral daily  polyethylene glycol 3350 17 Gram(s) Oral daily  senna 2 Tablet(s) Oral at bedtime      MEDICATIONS  (PRN):  acetaminophen   Tablet .. 650 milliGRAM(s) Oral every 6 hours PRN Temp greater or equal to 38C (100.4F), Mild Pain (1 - 3), Moderate Pain (4 - 6)  aluminum hydroxide/magnesium hydroxide/simethicone Suspension 30 milliLiter(s) Oral every 4 hours PRN Dyspepsia  benzonatate 100 milliGRAM(s) Oral every 8 hours PRN Cough  sodium chloride 0.65% Nasal 1 Spray(s) Both Nostrils two times a day PRN Congestion        LABS:   CBC Full  -  ( 30 Apr 2021 07:31 )  WBC Count : 8.51 K/uL  RBC Count : 4.27 M/uL  Hemoglobin : 11.8 g/dL  Hematocrit : 36.9 %  Platelet Count - Automated : 468 K/uL  Mean Cell Volume : 86.4 fL  Mean Cell Hemoglobin : 27.6 pg  Mean Cell Hemoglobin Concentration : 32.0 gm/dL  Auto Neutrophil # : x  Auto Lymphocyte # : x  Auto Monocyte # : x  Auto Eosinophil # : x  Auto Basophil # : x  Auto Neutrophil % : x  Auto Lymphocyte % : x  Auto Monocyte % : x  Auto Eosinophil % : x  Auto Basophil % : x      04-30    142  |  104  |  9   ----------------------------<  99  4.4   |  28  |  0.76    Ca    9.3      30 Apr 2021 07:31  Phos  3.1     04-30  Mg     2.5     04-30                  RADIOLOGY & ADDITIONAL STUDIES:      < from: CT Angio Chest w/ IV Cont (04.27.21 @ 19:27) >    EXAM:  CT ANGIO CHEST (W)AW IC        PROCEDURE DATE:  Apr 27 2021         INTERPRETATION:  CLINICAL INFORMATION: Fever, tachycardia, COVID positive    COMPARISON: CTA chest 4/13/2021.    CONTRAST/COMPLICATIONS:  IV Contrast: Omnipaque 350  90 cc administered   10 cc discarded  Oral Contrast: NONE  Complications: None reported at time of study completion    PROCEDURE:  CT Angiography of the Chest.  Sagittal and coronal reformats were performed as well as 3D (MIP) reconstructions.    FINDINGS:    PULMONARY ARTERY: No pulmonary embolus in the main, left and right, lobar and segmental pulmonary arteries. Limited evaluation of the subsegmental pulmonary arteries secondary to suboptimal contrast bolus timing and motion artifact.  LUNGS AND AIRWAYS: Patent central airways. Redemonstrated bilateral patchy groundglass opacities and consolidations in all the lobes of the bilateral lungs with interval increase in involvement of the bilateral upper lobes.  PLEURA: No pleural effusion.  MEDIASTINUMAND MIAN: No lymphadenopathy. New small foci of air in the anterior mediastinum.  VESSELS: Bovine aortic arch.  HEART: Heart size is normal. No pericardial effusion.  CHEST WALL AND LOWER NECK: Within normal limits.  VISUALIZED UPPER ABDOMEN: Small hiatal hernia.  BONES: Degenerative changes.    IMPRESSION:  No pulmonary embolism.    Redemonstrated bilateral groundglass opacities and consolidations with interval increase in involvement of the bilateral upper lobes. Findings are consistent with patient's history of atypical/COVID infection.    Pneumomediastinum.            HARMONY DIEZ MD; Resident Radiology  This document has been electronically signed.  KENIA ROGERS MD; Attending Radiologist  This document has been electronically signed. Apr 28 2021 11:56AM    < end of copied text >  < from: Xray Chest 1 View AP/PA (04.26.21 @ 16:52) >    EXAM:  XR CHEST AP OR PA 1V        PROCEDURE DATE:  Apr 26 2021         INTERPRETATION:  Portable chest radiograph    CLINICAL INFORMATION: Pneumonia due to Covid 19.    TECHNIQUE:  Portable  AP view of the chest was obtained.    COMPARISON: 4/13/2021 chest available for review.    FINDINGS:    The lungs show increasing LEFT greater than RIGHT multifocal and diffuse ill-defined airspace opacities.. No pneumothorax.    The heart and mediastinum are within normal limits.    Visualized osseous structures are intact.      IMPRESSION:   Increasing LEFT greater than RIGHT multifocal and diffuse ill-defined airspace opacities..                HENRY GRAHAM MD; Attending Radiologist  This document has been electronically signed. Apr 27 2021 10:38AM    < end of copied text >      ASSESSMENT:   61 yo with multiple medical problems admitted for covid 19      PLAN:  s/p remdesivir  and s/p dexamethasone  try taper to 2-3L of 02   the pneumomediastinum is small and can be followed up outpatient  I asked her to use IS as tolerated hourly  f/u ID re: fever?   would hold on any additional dexamethasone for now since her 02 requirements are low and have remained stable for days and she is having febrile episodes  tachycardic also outpatient- will speak with cardio re: beta blocker?   continue lovenox daily  would still trend her markers     Thank you for allowing me to participate in the care of this patient.  Please feel free to call me for any questions/concerns.      Jeana Richards DO  [facility] Pulmonary/Sleep Medicine  [phone] PULMONARY CONSULT NOTE      BISI AGUILAR  MRN-[id number]    Patient is a 60y old  Female who presents with a chief complaint of shortness of breath (29 Apr 2021 18:38)      HISTORY OF PRESENT ILLNESS:  61 yo female with DM, HTN, HLD depression admitted on 4/13 for fatigue secondary to covid 19.   Hospital course: NC, s/p remdesivir, s/p dexamethasone   intermittent febrile episodes  past 72 hours -seen by ID and cardio      today- nasal cannula not in place- shes 100%. winded with minimal activity  usign IS- 500cc        Allergies    erythromycin topical (Unknown)  Keflex (Unknown)    Intolerances        PAST MEDICAL & SURGICAL HISTORY:  Diabetes mellitus    HTN (hypertension)    Depression    No significant past surgical history            FAMILY HISTORY:  FH: diabetes mellitus      Prescriptions:      SOCIAL HISTORY  Smoking History: denies     REVIEW OF SYSTEMS:   slight cough  dyspnea with simple activities  post nasal drip  URI      Vital Signs Last 24 Hrs  T(C): 36.3 (30 Apr 2021 07:11), Max: 38.3 (29 Apr 2021 22:38)  T(F): 97.3 (30 Apr 2021 07:11), Max: 100.9 (29 Apr 2021 22:38)  HR: 117 (30 Apr 2021 07:11) (117 - 130)  BP: 120/84 (30 Apr 2021 07:11) (116/76 - 138/77)  BP(mean): --  RR: 16 (30 Apr 2021 07:11) (16 - 22)  SpO2: 96% (30 Apr 2021 07:11) (86% - 99%)    PHYSICAL EXAMINATION:    GENERAL: The patient is a well-developed, well-nourished female on 4L  winded   Head is normocephalic and atraumatic.  LUNGS: Respirations unlabored    HEART:tachy      NEUROLOGIC: Grossly intact      MEDICATIONS  (STANDING):  ALBUTerol    90 MICROgram(s) HFA Inhaler 1 Puff(s) Inhalation every 4 hours  ALBUTerol    90 MICROgram(s) HFA Inhaler 2 Puff(s) Inhalation every 6 hours  aspirin enteric coated 81 milliGRAM(s) Oral daily  dextrose 40% Gel 15 Gram(s) Oral once  dextrose 5%. 1000 milliLiter(s) (50 mL/Hr) IV Continuous <Continuous>  dextrose 5%. 1000 milliLiter(s) (100 mL/Hr) IV Continuous <Continuous>  dextrose 50% Injectable 25 Gram(s) IV Push once  dextrose 50% Injectable 12.5 Gram(s) IV Push once  dextrose 50% Injectable 25 Gram(s) IV Push once  enoxaparin Injectable 40 milliGRAM(s) SubCutaneous daily  glucagon  Injectable 1 milliGRAM(s) IntraMuscular once  insulin glargine Injectable (LANTUS) 12 Unit(s) SubCutaneous at bedtime  insulin lispro (ADMELOG) corrective regimen sliding scale   SubCutaneous three times a day before meals  insulin lispro (ADMELOG) corrective regimen sliding scale   SubCutaneous at bedtime  insulin lispro Injectable (ADMELOG) 4 Unit(s) SubCutaneous three times a day before meals  ipratropium 17 MICROgram(s) HFA Inhaler 1 Puff(s) Inhalation every 6 hours  loratadine 10 milliGRAM(s) Oral daily  metoprolol tartrate 25 milliGRAM(s) Oral two times a day  PARoxetine 10 milliGRAM(s) Oral daily  polyethylene glycol 3350 17 Gram(s) Oral daily  senna 2 Tablet(s) Oral at bedtime      MEDICATIONS  (PRN):  acetaminophen   Tablet .. 650 milliGRAM(s) Oral every 6 hours PRN Temp greater or equal to 38C (100.4F), Mild Pain (1 - 3), Moderate Pain (4 - 6)  aluminum hydroxide/magnesium hydroxide/simethicone Suspension 30 milliLiter(s) Oral every 4 hours PRN Dyspepsia  benzonatate 100 milliGRAM(s) Oral every 8 hours PRN Cough  sodium chloride 0.65% Nasal 1 Spray(s) Both Nostrils two times a day PRN Congestion        LABS:   CBC Full  -  ( 30 Apr 2021 07:31 )  WBC Count : 8.51 K/uL  RBC Count : 4.27 M/uL  Hemoglobin : 11.8 g/dL  Hematocrit : 36.9 %  Platelet Count - Automated : 468 K/uL  Mean Cell Volume : 86.4 fL  Mean Cell Hemoglobin : 27.6 pg  Mean Cell Hemoglobin Concentration : 32.0 gm/dL  Auto Neutrophil # : x  Auto Lymphocyte # : x  Auto Monocyte # : x  Auto Eosinophil # : x  Auto Basophil # : x  Auto Neutrophil % : x  Auto Lymphocyte % : x  Auto Monocyte % : x  Auto Eosinophil % : x  Auto Basophil % : x      04-30    142  |  104  |  9   ----------------------------<  99  4.4   |  28  |  0.76    Ca    9.3      30 Apr 2021 07:31  Phos  3.1     04-30  Mg     2.5     04-30                  RADIOLOGY & ADDITIONAL STUDIES:      < from: CT Angio Chest w/ IV Cont (04.27.21 @ 19:27) >    EXAM:  CT ANGIO CHEST (W)AW IC        PROCEDURE DATE:  Apr 27 2021         INTERPRETATION:  CLINICAL INFORMATION: Fever, tachycardia, COVID positive    COMPARISON: CTA chest 4/13/2021.    CONTRAST/COMPLICATIONS:  IV Contrast: Omnipaque 350  90 cc administered   10 cc discarded  Oral Contrast: NONE  Complications: None reported at time of study completion    PROCEDURE:  CT Angiography of the Chest.  Sagittal and coronal reformats were performed as well as 3D (MIP) reconstructions.    FINDINGS:    PULMONARY ARTERY: No pulmonary embolus in the main, left and right, lobar and segmental pulmonary arteries. Limited evaluation of the subsegmental pulmonary arteries secondary to suboptimal contrast bolus timing and motion artifact.  LUNGS AND AIRWAYS: Patent central airways. Redemonstrated bilateral patchy groundglass opacities and consolidations in all the lobes of the bilateral lungs with interval increase in involvement of the bilateral upper lobes.  PLEURA: No pleural effusion.  MEDIASTINUMAND MIAN: No lymphadenopathy. New small foci of air in the anterior mediastinum.  VESSELS: Bovine aortic arch.  HEART: Heart size is normal. No pericardial effusion.  CHEST WALL AND LOWER NECK: Within normal limits.  VISUALIZED UPPER ABDOMEN: Small hiatal hernia.  BONES: Degenerative changes.    IMPRESSION:  No pulmonary embolism.    Redemonstrated bilateral groundglass opacities and consolidations with interval increase in involvement of the bilateral upper lobes. Findings are consistent with patient's history of atypical/COVID infection.    Pneumomediastinum.            HARMONY DIEZ MD; Resident Radiology  This document has been electronically signed.  KENIA ROGERS MD; Attending Radiologist  This document has been electronically signed. Apr 28 2021 11:56AM    < end of copied text >  < from: Xray Chest 1 View AP/PA (04.26.21 @ 16:52) >    EXAM:  XR CHEST AP OR PA 1V        PROCEDURE DATE:  Apr 26 2021         INTERPRETATION:  Portable chest radiograph    CLINICAL INFORMATION: Pneumonia due to Covid 19.    TECHNIQUE:  Portable  AP view of the chest was obtained.    COMPARISON: 4/13/2021 chest available for review.    FINDINGS:    The lungs show increasing LEFT greater than RIGHT multifocal and diffuse ill-defined airspace opacities.. No pneumothorax.    The heart and mediastinum are within normal limits.    Visualized osseous structures are intact.      IMPRESSION:   Increasing LEFT greater than RIGHT multifocal and diffuse ill-defined airspace opacities..                HENRY GRAHAM MD; Attending Radiologist  This document has been electronically signed. Apr 27 2021 10:38AM    < end of copied text >      ASSESSMENT:   61 yo with multiple medical problems admitted for covid 19      PLAN:  s/p remdesivir  and s/p dexamethasone  try taper to 2-3L of 02   the pneumomediastinum is small and can be followed up outpatient  I asked her to use IS as tolerated hourly  f/u ID re: fever?   would hold on any additional dexamethasone for now since her 02 requirements are low and have remained stable for days and she is having febrile episodes  hold on any inhalers- she is a never smoker  tachycardic also outpatient- will speak with cardio re: beta blocker?   continue lovenox daily  would still trend her markers   outpatient f/u for NEENA evaluation, and her pneumomediastinum       Thank you for allowing me to participate in the care of this patient.  Please feel free to call me for any questions/concerns.      Jeana Richards, DO  [facility] Pulmonary/Sleep Medicine  [phone]

## 2021-04-30 NOTE — PROGRESS NOTE ADULT - PROBLEM SELECTOR PLAN 4
-A1c 7.8  -Hold home oral hypoglycemic agents ( Metformin)  -Monitor blood sugars qAC and qHS  -Lispro low insulin sliding scale  -DASH and Consistent carbohydrate diet  -c/w Lantus 12 QHS/Admelog 4 TIDAC

## 2021-04-30 NOTE — PROGRESS NOTE ADULT - SUBJECTIVE AND OBJECTIVE BOX
CC: F/U for COVID    Saw/spoke to patient. No fevers, no chills. No new complaints.    Allergies  erythromycin topical (Unknown)  Keflex (Unknown)    ANTIMICROBIALS:  Off    PE:    Vital Signs Last 24 Hrs  T(C): 36.9 (30 Apr 2021 13:11), Max: 38.3 (29 Apr 2021 22:38)  T(F): 98.4 (30 Apr 2021 13:11), Max: 100.9 (29 Apr 2021 22:38)  HR: 118 (30 Apr 2021 13:11) (117 - 130)  BP: 116/76 (30 Apr 2021 13:11) (116/76 - 138/77)  RR: 18 (30 Apr 2021 13:11) (16 - 22)  SpO2: 97% (30 Apr 2021 13:11) (86% - 99%)    Gen: AOx3, NAD, non-toxic  CV: S1+S2 normal, tachycardic  Resp: Clear bilat, no resp distress, no crackles/wheezes, nasal canula 4L  Abd: Soft, nontender, +BS  Ext: No LE edema, no wounds    LABS:                        11.8   8.51  )-----------( 468      ( 30 Apr 2021 07:31 )             36.9     04-30    142  |  104  |  9   ----------------------------<  99  4.4   |  28  |  0.76    Ca    9.3      30 Apr 2021 07:31  Phos  3.1     04-30  Mg     2.5     04-30    MICROBIOLOGY:    .Blood Blood  04-26-21   No growth to date.      .Blood Blood-Peripheral  04-26-21   No growth to date.    .Blood Blood-Peripheral  04-23-21   No Growth Final     .Blood Blood-Peripheral  04-13-21   No Growth Final    (otherwise reviewed)    RADIOLOGY:    4/27 CT:    IMPRESSION:  No pulmonary embolism.    Redemonstrated bilateral groundglass opacities and consolidations with interval increase in involvement of the bilateral upper lobes. Findings are consistent with patient's history of atypical/COVID infection.    Pneumomediastinum.

## 2021-04-30 NOTE — PROGRESS NOTE ADULT - SUBJECTIVE AND OBJECTIVE BOX
DATE OF SERVICE: 04-30-21 @ 18:51    Patient is a 60y old  Female who presents with a chief complaint of shortness of breath (30 Apr 2021 15:31)      INTERVAL HISTORY: feels ok, tachycardiac     REVIEW OF SYSTEMS:  CONSTITUTIONAL: No weakness  EYES/ENT: No visual changes;  No throat pain   NECK: No pain or stiffness  RESPIRATORY: No cough, wheezing; + shortness of breath  CARDIOVASCULAR: No chest pain or palpitations  GASTROINTESTINAL: No abdominal  pain. No nausea, vomiting, or hematemesis  GENITOURINARY: No dysuria, frequency or hematuria  NEUROLOGICAL: No stroke like symptoms  SKIN: No rashes    MEDICATIONS:  metoprolol tartrate 25 milliGRAM(s) Oral two times a day        PHYSICAL EXAM:  T(C): 36.9 (04-30-21 @ 13:11), Max: 38.3 (04-29-21 @ 22:38)  HR: 119 (04-30-21 @ 18:21) (117 - 125)  BP: 129/86 (04-30-21 @ 18:21) (116/76 - 129/86)  RR: 18 (04-30-21 @ 18:21) (16 - 22)  SpO2: 95% (04-30-21 @ 18:21) (87% - 97%)  Wt(kg): --  I&O's Summary    29 Apr 2021 07:01  -  30 Apr 2021 07:00  --------------------------------------------------------  IN: 650 mL / OUT: 400 mL / NET: 250 mL    30 Apr 2021 07:01  -  30 Apr 2021 18:51  --------------------------------------------------------  IN: 290 mL / OUT: 900 mL / NET: -610 mL          Appearance: In no distress	  HEENT:    PERRL, EOMI	  Cardiovascular:  S1 S2, No JVD  Respiratory: Lungs clear to auscultation	  Gastrointestinal:  Soft, Non-tender, + BS	  Vascularature:  No edema of LE  Psychiatric: Appropriate affect   Neuro: no acute focal deficits                               11.8   8.51  )-----------( 468      ( 30 Apr 2021 07:31 )             36.9     04-30    142  |  104  |  9   ----------------------------<  99  4.4   |  28  |  0.76    Ca    9.3      30 Apr 2021 07:31  Phos  3.1     04-30  Mg     2.5     04-30          Labs personally reviewed      EKG: Personally reviewed by me - Sinus tach, inferior wall TWI  Radiology: Personally reviewed by me -    from: CT Angio Chest w/ IV Cont (04.27.21 @ 19:27) >  IMPRESSION:  No pulmonary embolism.    Redemonstrated bilateral groundglass opacities and consolidations with interval increase in involvement of the bilateral upper lobes. Findings are consistent with patient's history of atypical/COVID infection.    Pneumomediastinum.      Assessment and Plan:   · Assessment	  60 year old female with T2DM A1c 7.8 (on Metformin), HTN and depression admitted to medicine for acute respiratory failure with hypoxia and sepsis 2/2 COVID 19.     Problem/Plan - 1:  ·  Problem: COVID-19.  Plan: - Repeat CT Angio chest w/ IV contrast-  no pulmonary embolus, on going multifocal pneumonia due to covid  -completed remdesivir and dexamethasone.   - still hypoxic requiring 3L oxygen  - Small pneumomediastinum - Pulm eval appreciated     Problem/Plan - 2:  ·  Problem: tachycardia   Plan: tachycardiac upto 130bpm, started Metoprolol 25mg BID and d/c Losartan  - troponin normal and echo pending to r/o COVID myocarditis     Problem/Plan - 3:  ·  Problem: Type 2 diabetes mellitus without complication, unspecified whether long term insulin use.  Plan: -A1c 7.8  -Lispro low insulin sliding scale  -DASH and Consistent carbohydrate diet    Problem/Plan - 4:  ·  Problem: Hypertension, unspecified type.  Plan: -Continue home medications: Holding Losartan 100mg qD, may need to restart at lower dose if BP uncontrolled    Problem/Plan - 5:  Problem: Hyperlipidemia, unspecified hyperlipidemia type. Plan: - C/w rosuvastatin.    Problem/Plan - 6:  ·  Problem: Need for prophylactic measure.  Plan: -DVT ppx: Lovenox 40mg SQ qD           Plan discussed with  and outpt cards Dr Eid. Thirty five minutes spent on total encounter, of which more than fifty percent of the encounter was spent counseling and/or coordinating care by the attending physician          Agustín Robertson DO [facility]  Cardiovascular Medicine  [address] [place] [address], [address]  Office: [phone]  Cell: [phone]

## 2021-05-01 LAB
ANION GAP SERPL CALC-SCNC: 13 MMOL/L — SIGNIFICANT CHANGE UP (ref 7–14)
BUN SERPL-MCNC: 9 MG/DL — SIGNIFICANT CHANGE UP (ref 7–23)
CALCIUM SERPL-MCNC: 9.7 MG/DL — SIGNIFICANT CHANGE UP (ref 8.4–10.5)
CHLORIDE SERPL-SCNC: 102 MMOL/L — SIGNIFICANT CHANGE UP (ref 98–107)
CO2 SERPL-SCNC: 25 MMOL/L — SIGNIFICANT CHANGE UP (ref 22–31)
CREAT SERPL-MCNC: 0.72 MG/DL — SIGNIFICANT CHANGE UP (ref 0.5–1.3)
CRP SERPL-MCNC: 59 MG/L — HIGH
CULTURE RESULTS: SIGNIFICANT CHANGE UP
CULTURE RESULTS: SIGNIFICANT CHANGE UP
D DIMER BLD IA.RAPID-MCNC: 3017 NG/ML DDU — HIGH
FERRITIN SERPL-MCNC: 406 NG/ML — HIGH (ref 15–150)
GLUCOSE SERPL-MCNC: 109 MG/DL — HIGH (ref 70–99)
HCT VFR BLD CALC: 34.7 % — SIGNIFICANT CHANGE UP (ref 34.5–45)
HGB BLD-MCNC: 10.8 G/DL — LOW (ref 11.5–15.5)
LDH SERPL L TO P-CCNC: 267 U/L — HIGH (ref 135–225)
MAGNESIUM SERPL-MCNC: 2.3 MG/DL — SIGNIFICANT CHANGE UP (ref 1.6–2.6)
MCHC RBC-ENTMCNC: 26.8 PG — LOW (ref 27–34)
MCHC RBC-ENTMCNC: 31.1 GM/DL — LOW (ref 32–36)
MCV RBC AUTO: 86.1 FL — SIGNIFICANT CHANGE UP (ref 80–100)
NRBC # BLD: 0 /100 WBCS — SIGNIFICANT CHANGE UP
NRBC # FLD: 0 K/UL — SIGNIFICANT CHANGE UP
PHOSPHATE SERPL-MCNC: 2.3 MG/DL — LOW (ref 2.5–4.5)
PLATELET # BLD AUTO: 464 K/UL — HIGH (ref 150–400)
POTASSIUM SERPL-MCNC: 4.3 MMOL/L — SIGNIFICANT CHANGE UP (ref 3.5–5.3)
POTASSIUM SERPL-SCNC: 4.3 MMOL/L — SIGNIFICANT CHANGE UP (ref 3.5–5.3)
RBC # BLD: 4.03 M/UL — SIGNIFICANT CHANGE UP (ref 3.8–5.2)
RBC # FLD: 15.8 % — HIGH (ref 10.3–14.5)
SODIUM SERPL-SCNC: 140 MMOL/L — SIGNIFICANT CHANGE UP (ref 135–145)
SPECIMEN SOURCE: SIGNIFICANT CHANGE UP
SPECIMEN SOURCE: SIGNIFICANT CHANGE UP
WBC # BLD: 10.16 K/UL — SIGNIFICANT CHANGE UP (ref 3.8–10.5)
WBC # FLD AUTO: 10.16 K/UL — SIGNIFICANT CHANGE UP (ref 3.8–10.5)

## 2021-05-01 PROCEDURE — 99233 SBSQ HOSP IP/OBS HIGH 50: CPT

## 2021-05-01 RX ORDER — ENOXAPARIN SODIUM 100 MG/ML
65 INJECTION SUBCUTANEOUS EVERY 12 HOURS
Refills: 0 | Status: DISCONTINUED | OUTPATIENT
Start: 2021-05-01 | End: 2021-05-06

## 2021-05-01 RX ORDER — METOPROLOL TARTRATE 50 MG
50 TABLET ORAL
Refills: 0 | Status: DISCONTINUED | OUTPATIENT
Start: 2021-05-01 | End: 2021-05-06

## 2021-05-01 RX ORDER — SODIUM,POTASSIUM PHOSPHATES 278-250MG
1 POWDER IN PACKET (EA) ORAL
Refills: 0 | Status: COMPLETED | OUTPATIENT
Start: 2021-05-01 | End: 2021-05-02

## 2021-05-01 RX ADMIN — ALBUTEROL 2 PUFF(S): 90 AEROSOL, METERED ORAL at 16:49

## 2021-05-01 RX ADMIN — Medication 4 UNIT(S): at 18:57

## 2021-05-01 RX ADMIN — INSULIN GLARGINE 12 UNIT(S): 100 INJECTION, SOLUTION SUBCUTANEOUS at 21:23

## 2021-05-01 RX ADMIN — Medication 81 MILLIGRAM(S): at 13:57

## 2021-05-01 RX ADMIN — Medication 4 UNIT(S): at 09:41

## 2021-05-01 RX ADMIN — ALBUTEROL 2 PUFF(S): 90 AEROSOL, METERED ORAL at 09:43

## 2021-05-01 RX ADMIN — ALBUTEROL 2 PUFF(S): 90 AEROSOL, METERED ORAL at 21:24

## 2021-05-01 RX ADMIN — Medication 4 UNIT(S): at 13:55

## 2021-05-01 RX ADMIN — Medication 10 MILLIGRAM(S): at 13:58

## 2021-05-01 RX ADMIN — ALBUTEROL 2 PUFF(S): 90 AEROSOL, METERED ORAL at 05:53

## 2021-05-01 RX ADMIN — Medication 50 MILLIGRAM(S): at 18:54

## 2021-05-01 RX ADMIN — Medication 1 PUFF(S): at 21:24

## 2021-05-01 RX ADMIN — ENOXAPARIN SODIUM 65 MILLIGRAM(S): 100 INJECTION SUBCUTANEOUS at 18:55

## 2021-05-01 RX ADMIN — Medication 25 MILLIGRAM(S): at 05:53

## 2021-05-01 RX ADMIN — Medication 1 PUFF(S): at 09:43

## 2021-05-01 RX ADMIN — Medication 1 PUFF(S): at 05:53

## 2021-05-01 RX ADMIN — Medication 650 MILLIGRAM(S): at 16:48

## 2021-05-01 RX ADMIN — LORATADINE 10 MILLIGRAM(S): 10 TABLET ORAL at 13:58

## 2021-05-01 RX ADMIN — Medication 1 PUFF(S): at 16:48

## 2021-05-01 NOTE — PROGRESS NOTE ADULT - SUBJECTIVE AND OBJECTIVE BOX
Patient is a 60y old  Female who presents with a chief complaint of shortness of breath (01 May 2021 12:48)        SUBJECTIVE / OVERNIGHT EVENTS:    no acute events o/n  now sating 100% on 2L at rest  sob improved  no cp  hopeful for DC soon     MEDICATIONS  (STANDING):  ALBUTerol    90 MICROgram(s) HFA Inhaler 1 Puff(s) Inhalation every 4 hours  ALBUTerol    90 MICROgram(s) HFA Inhaler 2 Puff(s) Inhalation every 6 hours  aspirin enteric coated 81 milliGRAM(s) Oral daily  dextrose 40% Gel 15 Gram(s) Oral once  dextrose 5%. 1000 milliLiter(s) (50 mL/Hr) IV Continuous <Continuous>  dextrose 5%. 1000 milliLiter(s) (100 mL/Hr) IV Continuous <Continuous>  dextrose 50% Injectable 25 Gram(s) IV Push once  dextrose 50% Injectable 12.5 Gram(s) IV Push once  dextrose 50% Injectable 25 Gram(s) IV Push once  enoxaparin Injectable 65 milliGRAM(s) SubCutaneous every 12 hours  glucagon  Injectable 1 milliGRAM(s) IntraMuscular once  insulin glargine Injectable (LANTUS) 12 Unit(s) SubCutaneous at bedtime  insulin lispro (ADMELOG) corrective regimen sliding scale   SubCutaneous at bedtime  insulin lispro (ADMELOG) corrective regimen sliding scale   SubCutaneous three times a day before meals  insulin lispro Injectable (ADMELOG) 4 Unit(s) SubCutaneous three times a day before meals  ipratropium 17 MICROgram(s) HFA Inhaler 1 Puff(s) Inhalation every 6 hours  loratadine 10 milliGRAM(s) Oral daily  metoprolol tartrate 50 milliGRAM(s) Oral two times a day  PARoxetine 10 milliGRAM(s) Oral daily  polyethylene glycol 3350 17 Gram(s) Oral daily  senna 2 Tablet(s) Oral at bedtime    MEDICATIONS  (PRN):  acetaminophen   Tablet .. 650 milliGRAM(s) Oral every 6 hours PRN Temp greater or equal to 38C (100.4F), Mild Pain (1 - 3), Moderate Pain (4 - 6)  aluminum hydroxide/magnesium hydroxide/simethicone Suspension 30 milliLiter(s) Oral every 4 hours PRN Dyspepsia  benzonatate 100 milliGRAM(s) Oral every 8 hours PRN Cough  sodium chloride 0.65% Nasal 1 Spray(s) Both Nostrils two times a day PRN Congestion      Vital Signs Last 24 Hrs  T(C): 37.2 (01 May 2021 05:47), Max: 37.3 (30 Apr 2021 19:59)  T(F): 99 (01 May 2021 05:47), Max: 99.2 (30 Apr 2021 19:59)  HR: 124 (01 May 2021 05:47) (112 - 124)  BP: 131/93 (01 May 2021 05:47) (116/76 - 131/93)  BP(mean): --  RR: 18 (01 May 2021 05:47) (18 - 18)  SpO2: 98% (01 May 2021 05:47) (95% - 99%)  CAPILLARY BLOOD GLUCOSE      POCT Blood Glucose.: 109 mg/dL (01 May 2021 09:04)  POCT Blood Glucose.: 178 mg/dL (30 Apr 2021 21:22)  POCT Blood Glucose.: 249 mg/dL (30 Apr 2021 18:24)  POCT Blood Glucose.: 139 mg/dL (30 Apr 2021 13:19)    I&O's Summary    30 Apr 2021 07:01  -  01 May 2021 07:00  --------------------------------------------------------  IN: 600 mL / OUT: 1200 mL / NET: -600 mL          PHYSICAL EXAM  GENERAL: NAD, well-developed  HEAD:  Atraumatic, Normocephalic  EYES: EOMI, PERRLA, conjunctiva and sclera clear  NECK: Supple, No JVD  CHEST/LUNG: Clear to auscultation bilaterally; No wheeze  HEART: Regular rate and rhythm; No murmurs, rubs, or gallops  ABDOMEN: Soft, Nontender, Nondistended; Bowel sounds present  EXTREMITIES:  2+ Peripheral Pulses, No clubbing, cyanosis, or edema      LABS:                        10.8   10.16 )-----------( 464      ( 01 May 2021 07:52 )             34.7     05-01    140  |  102  |  9   ----------------------------<  109<H>  4.3   |  25  |  0.72    Ca    9.7      01 May 2021 07:52  Phos  2.3     05-01  Mg     2.3     05-01                RADIOLOGY & ADDITIONAL TESTS:    Imaging Personally Reviewed:  Consultant(s) Notes Reviewed:    Care Discussed with Consultants/Other Providers:

## 2021-05-01 NOTE — PROGRESS NOTE ADULT - PROBLEM SELECTOR PLAN 5
d/c losartan as pt thinks its contributes to her palpitations/tachycardia  will start metoprolol BID, uptitrate to 50 mg po BID 5/1   awaiting 2D echo  troponin neg

## 2021-05-01 NOTE — PROGRESS NOTE ADULT - PROBLEM SELECTOR PLAN 1
afebrile overnight  appreciate ID input, no evidence of secondary bacterial infection, off abx (received Zosyn x 3 days)   blood cultures remain neg, last 4/26, d/w ID, will repeat blood cultures x 2 - sent this AM

## 2021-05-01 NOTE — PROGRESS NOTE ADULT - PROBLEM SELECTOR PLAN 2
remains on o2 and tachycardic  continue supportive care  appreciate Pulm eval - the pneumomediastinum is small and can be followed up outpatient

## 2021-05-01 NOTE — PROGRESS NOTE ADULT - PROBLEM SELECTOR PLAN 3
- Repeat CT Angio chest w/ IV contrast-  no pulmonary embolus, on going multifocal pneumonia due to covid  -completed remdesivir and dexamethasone  -increasing d-dimer, will prophylactically start therapeutic Lovenox BID, repeat b/l LE dopplers

## 2021-05-01 NOTE — PROGRESS NOTE ADULT - SUBJECTIVE AND OBJECTIVE BOX
PULMONARY FOLLOW UP NOTE      BISI AGUILAR  MRN-[id number]      on 2 L sat 99%  says shes feeling better        SOCIAL HISTORY  Smoking History: denies     MEDICATIONS  (STANDING):  ALBUTerol    90 MICROgram(s) HFA Inhaler 1 Puff(s) Inhalation every 4 hours  ALBUTerol    90 MICROgram(s) HFA Inhaler 2 Puff(s) Inhalation every 6 hours  aspirin enteric coated 81 milliGRAM(s) Oral daily  dextrose 40% Gel 15 Gram(s) Oral once  dextrose 5%. 1000 milliLiter(s) (50 mL/Hr) IV Continuous <Continuous>  dextrose 5%. 1000 milliLiter(s) (100 mL/Hr) IV Continuous <Continuous>  dextrose 50% Injectable 25 Gram(s) IV Push once  dextrose 50% Injectable 12.5 Gram(s) IV Push once  dextrose 50% Injectable 25 Gram(s) IV Push once  enoxaparin Injectable 65 milliGRAM(s) SubCutaneous every 12 hours  glucagon  Injectable 1 milliGRAM(s) IntraMuscular once  insulin glargine Injectable (LANTUS) 12 Unit(s) SubCutaneous at bedtime  insulin lispro (ADMELOG) corrective regimen sliding scale   SubCutaneous three times a day before meals  insulin lispro (ADMELOG) corrective regimen sliding scale   SubCutaneous at bedtime  insulin lispro Injectable (ADMELOG) 4 Unit(s) SubCutaneous three times a day before meals  ipratropium 17 MICROgram(s) HFA Inhaler 1 Puff(s) Inhalation every 6 hours  loratadine 10 milliGRAM(s) Oral daily  metoprolol tartrate 50 milliGRAM(s) Oral two times a day  PARoxetine 10 milliGRAM(s) Oral daily  polyethylene glycol 3350 17 Gram(s) Oral daily  senna 2 Tablet(s) Oral at bedtime    MEDICATIONS  (PRN):  acetaminophen   Tablet .. 650 milliGRAM(s) Oral every 6 hours PRN Temp greater or equal to 38C (100.4F), Mild Pain (1 - 3), Moderate Pain (4 - 6)  aluminum hydroxide/magnesium hydroxide/simethicone Suspension 30 milliLiter(s) Oral every 4 hours PRN Dyspepsia  benzonatate 100 milliGRAM(s) Oral every 8 hours PRN Cough  sodium chloride 0.65% Nasal 1 Spray(s) Both Nostrils two times a day PRN Congestion        PHYSICAL EXAMINATION:  Vital Signs Last 24 Hrs  T(C): 37.2 (01 May 2021 05:47), Max: 37.3 (30 Apr 2021 19:59)  T(F): 99 (01 May 2021 05:47), Max: 99.2 (30 Apr 2021 19:59)  HR: 124 (01 May 2021 05:47) (112 - 124)  BP: 131/93 (01 May 2021 05:47) (116/76 - 131/93)  BP(mean): --  RR: 18 (01 May 2021 05:47) (18 - 18)  SpO2: 98% (01 May 2021 05:47) (95% - 99%)  GENERAL: no distress  LUNGS: Respirations unlabored        LABS:                            10.8   10.16 )-----------( 464      ( 01 May 2021 07:52 )             34.7   05-01    140  |  102  |  9   ----------------------------<  109<H>  4.3   |  25  |  0.72    Ca    9.7      01 May 2021 07:52  Phos  2.3     05-01  Mg     2.3     05-01        RADIOLOGY & ADDITIONAL STUDIES:      < from: CT Angio Chest w/ IV Cont (04.27.21 @ 19:27) >    EXAM:  CT ANGIO CHEST (W)AW IC        PROCEDURE DATE:  Apr 27 2021         INTERPRETATION:  CLINICAL INFORMATION: Fever, tachycardia, COVID positive    COMPARISON: CTA chest 4/13/2021.    CONTRAST/COMPLICATIONS:  IV Contrast: Omnipaque 350  90 cc administered   10 cc discarded  Oral Contrast: NONE  Complications: None reported at time of study completion    PROCEDURE:  CT Angiography of the Chest.  Sagittal and coronal reformats were performed as well as 3D (MIP) reconstructions.    FINDINGS:    PULMONARY ARTERY: No pulmonary embolus in the main, left and right, lobar and segmental pulmonary arteries. Limited evaluation of the subsegmental pulmonary arteries secondary to suboptimal contrast bolus timing and motion artifact.  LUNGS AND AIRWAYS: Patent central airways. Redemonstrated bilateral patchy groundglass opacities and consolidations in all the lobes of the bilateral lungs with interval increase in involvement of the bilateral upper lobes.  PLEURA: No pleural effusion.  MEDIASTINUMAND MIAN: No lymphadenopathy. New small foci of air in the anterior mediastinum.  VESSELS: Bovine aortic arch.  HEART: Heart size is normal. No pericardial effusion.  CHEST WALL AND LOWER NECK: Within normal limits.  VISUALIZED UPPER ABDOMEN: Small hiatal hernia.  BONES: Degenerative changes.    IMPRESSION:  No pulmonary embolism.    Redemonstrated bilateral groundglass opacities and consolidations with interval increase in involvement of the bilateral upper lobes. Findings are consistent with patient's history of atypical/COVID infection.    Pneumomediastinum.            HARMONY DIEZ MD; Resident Radiology  This document has been electronically signed.  KENIA ROGERS MD; Attending Radiologist  This document has been electronically signed. Apr 28 2021 11:56AM    < end of copied text >  < from: Xray Chest 1 View AP/PA (04.26.21 @ 16:52) >    EXAM:  XR CHEST AP OR PA 1V        PROCEDURE DATE:  Apr 26 2021         INTERPRETATION:  Portable chest radiograph    CLINICAL INFORMATION: Pneumonia due to Covid 19.    TECHNIQUE:  Portable  AP view of the chest was obtained.    COMPARISON: 4/13/2021 chest available for review.    FINDINGS:    The lungs show increasing LEFT greater than RIGHT multifocal and diffuse ill-defined airspace opacities.. No pneumothorax.    The heart and mediastinum are within normal limits.    Visualized osseous structures are intact.      IMPRESSION:   Increasing LEFT greater than RIGHT multifocal and diffuse ill-defined airspace opacities..                HENRY GRAHAM MD; Attending Radiologist  This document has been electronically signed. Apr 27 2021 10:38AM    < end of copied text >      ASSESSMENT:   61 yo with multiple medical problems admitted for covid 19      PLAN:  s/p remdesivir  and s/p dexamethasone  taper O2 as tolerated  the pneumomediastinum is small and can be followed up outpatient  continue lovenox daily for DVT prophylaxis  outpatient f/u for NEENA evaluation with , and her pneumomediastinum       Thank you for allowing me to participate in the care of this patient.  Please feel free to call me for any questions/concerns.      Marge Alex MD  [facility] Pulmonary/Sleep Medicine  [phone]

## 2021-05-02 PROCEDURE — 99233 SBSQ HOSP IP/OBS HIGH 50: CPT

## 2021-05-02 RX ORDER — ALPRAZOLAM 0.25 MG
0.5 TABLET ORAL ONCE
Refills: 0 | Status: DISCONTINUED | OUTPATIENT
Start: 2021-05-02 | End: 2021-05-02

## 2021-05-02 RX ADMIN — Medication 1 TABLET(S): at 13:40

## 2021-05-02 RX ADMIN — Medication 1 PUFF(S): at 05:53

## 2021-05-02 RX ADMIN — Medication 650 MILLIGRAM(S): at 07:08

## 2021-05-02 RX ADMIN — Medication 10 MILLIGRAM(S): at 13:40

## 2021-05-02 RX ADMIN — POLYETHYLENE GLYCOL 3350 17 GRAM(S): 17 POWDER, FOR SOLUTION ORAL at 13:41

## 2021-05-02 RX ADMIN — ALBUTEROL 2 PUFF(S): 90 AEROSOL, METERED ORAL at 10:32

## 2021-05-02 RX ADMIN — ALBUTEROL 2 PUFF(S): 90 AEROSOL, METERED ORAL at 22:02

## 2021-05-02 RX ADMIN — Medication 0.5 MILLIGRAM(S): at 17:14

## 2021-05-02 RX ADMIN — INSULIN GLARGINE 12 UNIT(S): 100 INJECTION, SOLUTION SUBCUTANEOUS at 22:01

## 2021-05-02 RX ADMIN — Medication 4 UNIT(S): at 18:28

## 2021-05-02 RX ADMIN — Medication 2: at 18:28

## 2021-05-02 RX ADMIN — Medication 1 PUFF(S): at 10:39

## 2021-05-02 RX ADMIN — Medication 4 UNIT(S): at 10:12

## 2021-05-02 RX ADMIN — Medication 1 PUFF(S): at 16:56

## 2021-05-02 RX ADMIN — SENNA PLUS 2 TABLET(S): 8.6 TABLET ORAL at 21:55

## 2021-05-02 RX ADMIN — Medication 4 UNIT(S): at 13:44

## 2021-05-02 RX ADMIN — Medication 81 MILLIGRAM(S): at 13:39

## 2021-05-02 RX ADMIN — Medication 50 MILLIGRAM(S): at 05:48

## 2021-05-02 RX ADMIN — ENOXAPARIN SODIUM 65 MILLIGRAM(S): 100 INJECTION SUBCUTANEOUS at 19:25

## 2021-05-02 RX ADMIN — Medication 1 TABLET(S): at 10:05

## 2021-05-02 RX ADMIN — Medication 50 MILLIGRAM(S): at 16:57

## 2021-05-02 RX ADMIN — ALBUTEROL 2 PUFF(S): 90 AEROSOL, METERED ORAL at 16:56

## 2021-05-02 RX ADMIN — ALBUTEROL 2 PUFF(S): 90 AEROSOL, METERED ORAL at 05:52

## 2021-05-02 RX ADMIN — ENOXAPARIN SODIUM 65 MILLIGRAM(S): 100 INJECTION SUBCUTANEOUS at 07:04

## 2021-05-02 RX ADMIN — Medication 650 MILLIGRAM(S): at 05:42

## 2021-05-02 RX ADMIN — LORATADINE 10 MILLIGRAM(S): 10 TABLET ORAL at 13:40

## 2021-05-02 NOTE — PROGRESS NOTE ADULT - SUBJECTIVE AND OBJECTIVE BOX
Patient is a 60y old  Female who presents with a chief complaint of shortness of breath (01 May 2021 20:26)        SUBJECTIVE / OVERNIGHT EVENTS:  T 100.4 overnight  feels well, sating 100% on 2L -> i weaned her down to 1L   pt denies cp/sob  hopeful for dc soon     MEDICATIONS  (STANDING):  ALBUTerol    90 MICROgram(s) HFA Inhaler 1 Puff(s) Inhalation every 4 hours  ALBUTerol    90 MICROgram(s) HFA Inhaler 2 Puff(s) Inhalation every 6 hours  aspirin enteric coated 81 milliGRAM(s) Oral daily  dextrose 40% Gel 15 Gram(s) Oral once  dextrose 5%. 1000 milliLiter(s) (50 mL/Hr) IV Continuous <Continuous>  dextrose 5%. 1000 milliLiter(s) (100 mL/Hr) IV Continuous <Continuous>  dextrose 50% Injectable 25 Gram(s) IV Push once  dextrose 50% Injectable 12.5 Gram(s) IV Push once  dextrose 50% Injectable 25 Gram(s) IV Push once  enoxaparin Injectable 65 milliGRAM(s) SubCutaneous every 12 hours  glucagon  Injectable 1 milliGRAM(s) IntraMuscular once  insulin glargine Injectable (LANTUS) 12 Unit(s) SubCutaneous at bedtime  insulin lispro (ADMELOG) corrective regimen sliding scale   SubCutaneous at bedtime  insulin lispro (ADMELOG) corrective regimen sliding scale   SubCutaneous three times a day before meals  insulin lispro Injectable (ADMELOG) 4 Unit(s) SubCutaneous three times a day before meals  ipratropium 17 MICROgram(s) HFA Inhaler 1 Puff(s) Inhalation every 6 hours  loratadine 10 milliGRAM(s) Oral daily  metoprolol tartrate 50 milliGRAM(s) Oral two times a day  PARoxetine 10 milliGRAM(s) Oral daily  polyethylene glycol 3350 17 Gram(s) Oral daily  potassium phosphate / sodium phosphate Tablet (K-PHOS No. 2) 1 Tablet(s) Oral three times a day after meals  senna 2 Tablet(s) Oral at bedtime    MEDICATIONS  (PRN):  acetaminophen   Tablet .. 650 milliGRAM(s) Oral every 6 hours PRN Temp greater or equal to 38C (100.4F), Mild Pain (1 - 3), Moderate Pain (4 - 6)  aluminum hydroxide/magnesium hydroxide/simethicone Suspension 30 milliLiter(s) Oral every 4 hours PRN Dyspepsia  benzonatate 100 milliGRAM(s) Oral every 8 hours PRN Cough  sodium chloride 0.65% Nasal 1 Spray(s) Both Nostrils two times a day PRN Congestion      Vital Signs Last 24 Hrs  T(C): 37 (02 May 2021 07:12), Max: 38 (02 May 2021 03:58)  T(F): 98.6 (02 May 2021 07:12), Max: 100.4 (02 May 2021 03:58)  HR: 104 (02 May 2021 10:42) (104 - 127)  BP: 123/75 (02 May 2021 10:42) (101/75 - 131/87)  BP(mean): --  RR: 20 (02 May 2021 10:42) (17 - 20)  SpO2: 100% (02 May 2021 10:42) (98% - 100%)  CAPILLARY BLOOD GLUCOSE      POCT Blood Glucose.: 137 mg/dL (02 May 2021 09:50)  POCT Blood Glucose.: 211 mg/dL (01 May 2021 21:21)  POCT Blood Glucose.: 116 mg/dL (01 May 2021 18:01)    I&O's Summary        PHYSICAL EXAM  GENERAL: NAD, well-developed  HEAD:  Atraumatic, Normocephalic  EYES: EOMI, PERRLA, conjunctiva and sclera clear  NECK: Supple, No JVD  CHEST/LUNG: Clear to auscultation bilaterally; No wheeze  HEART: Regular rate and rhythm; No murmurs, rubs, or gallops  ABDOMEN: Soft, Nontender, Nondistended; Bowel sounds present  EXTREMITIES:  2+ Peripheral Pulses, No clubbing, cyanosis, or edema      LABS:                        10.8   10.16 )-----------( 464      ( 01 May 2021 07:52 )             34.7     05-01    140  |  102  |  9   ----------------------------<  109<H>  4.3   |  25  |  0.72    Ca    9.7      01 May 2021 07:52  Phos  2.3     05-01  Mg     2.3     05-01                RADIOLOGY & ADDITIONAL TESTS:    Imaging Personally Reviewed:  Consultant(s) Notes Reviewed:    Care Discussed with Consultants/Other Providers:

## 2021-05-02 NOTE — PROGRESS NOTE ADULT - PROBLEM SELECTOR PLAN 3
- Repeat CT Angio chest w/ IV contrast-  no pulmonary embolus, on going multifocal pneumonia due to covid  -completed remdesivir and dexamethasone  -increasing d-dimer, will prophylactically start therapeutic Lovenox BID, repeat b/l LE dopplers pending

## 2021-05-02 NOTE — PROGRESS NOTE ADULT - PROBLEM SELECTOR PLAN 5
d/c losartan as pt thinks its contributes to her palpitations/tachycardia  will start metoprolol BID, uptitrated to 50 mg po BID 5/1   awaiting 2D echo  troponin neg d/c losartan as pt thinks its contributes to her palpitations/tachycardia  will start metoprolol BID, uptitrated to 50 mg po BID 5/1   awaiting 2D echo  troponin neg  Check TSH - added on to BMP this AM

## 2021-05-02 NOTE — PROGRESS NOTE ADULT - PROBLEM SELECTOR PLAN 1
low grade fever last night  appreciate ID f/u, no evidence of secondary bacterial infection, off abx (received Zosyn x 3 days)   all blood cultures thus far negative, blood cultures repeated 5/1 - NGTD

## 2021-05-03 LAB
ANION GAP SERPL CALC-SCNC: 12 MMOL/L — SIGNIFICANT CHANGE UP (ref 7–14)
BUN SERPL-MCNC: 14 MG/DL — SIGNIFICANT CHANGE UP (ref 7–23)
CALCIUM SERPL-MCNC: 9.5 MG/DL — SIGNIFICANT CHANGE UP (ref 8.4–10.5)
CHLORIDE SERPL-SCNC: 103 MMOL/L — SIGNIFICANT CHANGE UP (ref 98–107)
CO2 SERPL-SCNC: 23 MMOL/L — SIGNIFICANT CHANGE UP (ref 22–31)
CREAT SERPL-MCNC: 0.74 MG/DL — SIGNIFICANT CHANGE UP (ref 0.5–1.3)
GLUCOSE BLDC GLUCOMTR-MCNC: 118 MG/DL — HIGH (ref 70–99)
GLUCOSE BLDC GLUCOMTR-MCNC: 229 MG/DL — HIGH (ref 70–99)
GLUCOSE BLDC GLUCOMTR-MCNC: 89 MG/DL — SIGNIFICANT CHANGE UP (ref 70–99)
GLUCOSE SERPL-MCNC: 133 MG/DL — HIGH (ref 70–99)
HCT VFR BLD CALC: 37.6 % — SIGNIFICANT CHANGE UP (ref 34.5–45)
HGB BLD-MCNC: 11.8 G/DL — SIGNIFICANT CHANGE UP (ref 11.5–15.5)
MAGNESIUM SERPL-MCNC: 2.3 MG/DL — SIGNIFICANT CHANGE UP (ref 1.6–2.6)
MCHC RBC-ENTMCNC: 27.5 PG — SIGNIFICANT CHANGE UP (ref 27–34)
MCHC RBC-ENTMCNC: 31.4 GM/DL — LOW (ref 32–36)
MCV RBC AUTO: 87.6 FL — SIGNIFICANT CHANGE UP (ref 80–100)
NRBC # BLD: 0 /100 WBCS — SIGNIFICANT CHANGE UP
NRBC # FLD: 0 K/UL — SIGNIFICANT CHANGE UP
PHOSPHATE SERPL-MCNC: 3.8 MG/DL — SIGNIFICANT CHANGE UP (ref 2.5–4.5)
PLATELET # BLD AUTO: 328 K/UL — SIGNIFICANT CHANGE UP (ref 150–400)
POTASSIUM SERPL-MCNC: 5.2 MMOL/L — SIGNIFICANT CHANGE UP (ref 3.5–5.3)
POTASSIUM SERPL-SCNC: 5.2 MMOL/L — SIGNIFICANT CHANGE UP (ref 3.5–5.3)
RBC # BLD: 4.29 M/UL — SIGNIFICANT CHANGE UP (ref 3.8–5.2)
RBC # FLD: 16.2 % — HIGH (ref 10.3–14.5)
SODIUM SERPL-SCNC: 138 MMOL/L — SIGNIFICANT CHANGE UP (ref 135–145)
TSH SERPL-MCNC: 2.56 UIU/ML — SIGNIFICANT CHANGE UP (ref 0.27–4.2)
WBC # BLD: 7.9 K/UL — SIGNIFICANT CHANGE UP (ref 3.8–10.5)
WBC # FLD AUTO: 7.9 K/UL — SIGNIFICANT CHANGE UP (ref 3.8–10.5)

## 2021-05-03 PROCEDURE — 99232 SBSQ HOSP IP/OBS MODERATE 35: CPT

## 2021-05-03 PROCEDURE — 93306 TTE W/DOPPLER COMPLETE: CPT | Mod: 26

## 2021-05-03 PROCEDURE — 99233 SBSQ HOSP IP/OBS HIGH 50: CPT

## 2021-05-03 RX ADMIN — LORATADINE 10 MILLIGRAM(S): 10 TABLET ORAL at 12:51

## 2021-05-03 RX ADMIN — Medication 50 MILLIGRAM(S): at 05:58

## 2021-05-03 RX ADMIN — ALBUTEROL 2 PUFF(S): 90 AEROSOL, METERED ORAL at 05:58

## 2021-05-03 RX ADMIN — Medication 81 MILLIGRAM(S): at 12:51

## 2021-05-03 RX ADMIN — Medication 30 MILLILITER(S): at 18:30

## 2021-05-03 RX ADMIN — Medication 650 MILLIGRAM(S): at 21:11

## 2021-05-03 RX ADMIN — Medication 1 PUFF(S): at 05:58

## 2021-05-03 RX ADMIN — Medication 1 PUFF(S): at 21:24

## 2021-05-03 RX ADMIN — Medication 50 MILLIGRAM(S): at 18:22

## 2021-05-03 RX ADMIN — INSULIN GLARGINE 12 UNIT(S): 100 INJECTION, SOLUTION SUBCUTANEOUS at 21:24

## 2021-05-03 RX ADMIN — Medication 650 MILLIGRAM(S): at 13:08

## 2021-05-03 RX ADMIN — Medication 10 MILLIGRAM(S): at 12:51

## 2021-05-03 RX ADMIN — ENOXAPARIN SODIUM 65 MILLIGRAM(S): 100 INJECTION SUBCUTANEOUS at 18:20

## 2021-05-03 RX ADMIN — Medication 650 MILLIGRAM(S): at 22:05

## 2021-05-03 RX ADMIN — Medication 1 PUFF(S): at 18:18

## 2021-05-03 RX ADMIN — Medication 4 UNIT(S): at 09:43

## 2021-05-03 RX ADMIN — Medication 4: at 09:43

## 2021-05-03 RX ADMIN — Medication 1 PUFF(S): at 09:46

## 2021-05-03 RX ADMIN — ENOXAPARIN SODIUM 65 MILLIGRAM(S): 100 INJECTION SUBCUTANEOUS at 05:57

## 2021-05-03 NOTE — PROGRESS NOTE ADULT - SUBJECTIVE AND OBJECTIVE BOX
DATE OF SERVICE: 05-03-21 @ 23:23    Patient is a 60y old  Female who presents with a chief complaint of shortness of breath (03 May 2021 17:22)      INTERVAL HISTORY: feels ok    REVIEW OF SYSTEMS:  CONSTITUTIONAL: No weakness  EYES/ENT: No visual changes;  No throat pain   NECK: No pain or stiffness  RESPIRATORY: No cough, wheezing; + shortness of breath  CARDIOVASCULAR: No chest pain or palpitations  GASTROINTESTINAL: No abdominal  pain. No nausea, vomiting, or hematemesis  GENITOURINARY: No dysuria, frequency or hematuria  NEUROLOGICAL: No stroke like symptoms  SKIN: No rashes    	  MEDICATIONS:  metoprolol tartrate 50 milliGRAM(s) Oral two times a day        PHYSICAL EXAM:  T(C): 37.6 (05-03-21 @ 21:10), Max: 37.6 (05-03-21 @ 21:10)  HR: 106 (05-03-21 @ 21:10) (106 - 119)  BP: 125/78 (05-03-21 @ 21:10) (120/89 - 131/79)  RR: 20 (05-03-21 @ 21:10) (18 - 21)  SpO2: 94% (05-03-21 @ 21:10) (91% - 98%)  Wt(kg): --  I&O's Summary    02 May 2021 07:01  -  03 May 2021 07:00  --------------------------------------------------------  IN: 1000 mL / OUT: 800 mL / NET: 200 mL          Appearance: In no distress	  HEENT:    PERRL, EOMI	  Cardiovascular:  S1 S2, No JVD  Respiratory: Lungs clear to auscultation	  Gastrointestinal:  Soft, Non-tender, + BS	  Vascularature:  No edema of LE  Psychiatric: Appropriate affect   Neuro: no acute focal deficits                               11.8   7.90  )-----------( 328      ( 03 May 2021 07:43 )             37.6     05-03    138  |  103  |  14  ----------------------------<  133<H>  5.2   |  23  |  0.74    Ca    9.5      03 May 2021 07:43  Phos  3.8     05-03  Mg     2.3     05-03          Labs personally reviewed    TTE CONCLUSIONS:  1. Normal mitral valve.  2. Normal trileaflet aortic valve.  3. Increased relative wall thickness with normal left  ventricular mass index, consistent with concentric left  ventricular remodeling.  4. Hyperdynamic left ventricle.  5. The right ventricle is not well visualized; grossly  normal right ventricular systolic function.    Assessment and Plan:   · Assessment	  60 year old female with T2DM A1c 7.8 (on Metformin), HTN and depression admitted to medicine for acute respiratory failure with hypoxia and sepsis 2/2 COVID 19.     Problem/Plan - 1:  ·  Problem: COVID-19.  Plan: - Repeat CT Angio chest w/ IV contrast-  no pulmonary embolus, on going multifocal pneumonia due to covid  -completed remdesivir and dexamethasone.   - still hypoxic requiring 3L oxygen  - Small pneumomediastinum - Pulm eval appreciated     Problem/Plan - 2:  ·  Problem: tachycardia   Plan: tachycardiac upto 130bpm, started Metoprolol 25mg BID and d/c Losartan  - troponin normal and echo pending to r/o COVID myocarditis   - TSH normal   - started metoprolol and uptitrated    Problem/Plan - 3:  ·  Problem: Type 2 diabetes mellitus without complication, unspecified whether long term insulin use.  Plan: -A1c 7.8  -Lispro low insulin sliding scale  -DASH and Consistent carbohydrate diet    Problem/Plan - 4:  ·  Problem: Hypertension, unspecified type.  Plan: -Holding Losartan 100mg qD, may need to restart at lower dose if BP uncontrolled  - Metoprolol started      Thirty five minutes spent on total encounter, of which more than fifty percent of the encounter was spent on counseling and/or coordinating care by the attending physician.      Agustín Robertson DO [facility]  Cardiovascular Medicine  [address] [place] [address], [address]  Office: [phone]  Cell: [phone]

## 2021-05-03 NOTE — PROGRESS NOTE ADULT - SUBJECTIVE AND OBJECTIVE BOX
CC: Patient is a 60y old  Female who presents with a chief complaint of shortness of breath (03 May 2021 17:17)    ID following for COVID PNA    Interval History/ROS: Patient remains on room air, still remains SOB on exertion. No fevers today.    Rest of ROS negative.    Allergies  erythromycin topical (Unknown)  Keflex (Unknown)    ANTIMICROBIALS:      OTHER MEDS:  acetaminophen   Tablet .. 650 milliGRAM(s) Oral every 6 hours PRN  ALBUTerol    90 MICROgram(s) HFA Inhaler 1 Puff(s) Inhalation every 4 hours  ALBUTerol    90 MICROgram(s) HFA Inhaler 2 Puff(s) Inhalation every 6 hours  aluminum hydroxide/magnesium hydroxide/simethicone Suspension 30 milliLiter(s) Oral every 4 hours PRN  aspirin enteric coated 81 milliGRAM(s) Oral daily  benzonatate 100 milliGRAM(s) Oral every 8 hours PRN  dextrose 40% Gel 15 Gram(s) Oral once  dextrose 5%. 1000 milliLiter(s) IV Continuous <Continuous>  dextrose 5%. 1000 milliLiter(s) IV Continuous <Continuous>  dextrose 50% Injectable 25 Gram(s) IV Push once  dextrose 50% Injectable 12.5 Gram(s) IV Push once  dextrose 50% Injectable 25 Gram(s) IV Push once  enoxaparin Injectable 65 milliGRAM(s) SubCutaneous every 12 hours  glucagon  Injectable 1 milliGRAM(s) IntraMuscular once  insulin glargine Injectable (LANTUS) 12 Unit(s) SubCutaneous at bedtime  insulin lispro (ADMELOG) corrective regimen sliding scale   SubCutaneous at bedtime  insulin lispro (ADMELOG) corrective regimen sliding scale   SubCutaneous three times a day before meals  insulin lispro Injectable (ADMELOG) 4 Unit(s) SubCutaneous three times a day before meals  ipratropium 17 MICROgram(s) HFA Inhaler 1 Puff(s) Inhalation every 6 hours  loratadine 10 milliGRAM(s) Oral daily  metoprolol tartrate 50 milliGRAM(s) Oral two times a day  PARoxetine 10 milliGRAM(s) Oral daily  polyethylene glycol 3350 17 Gram(s) Oral daily  senna 2 Tablet(s) Oral at bedtime  sodium chloride 0.65% Nasal 1 Spray(s) Both Nostrils two times a day PRN    PE:    Vital Signs Last 24 Hrs  T(C): 37.3 (03 May 2021 09:54), Max: 37.3 (03 May 2021 09:54)  T(F): 99.2 (03 May 2021 09:54), Max: 99.2 (03 May 2021 09:54)  HR: 112 (03 May 2021 09:54) (102 - 119)  BP: 126/80 (03 May 2021 09:54) (120/74 - 131/79)  BP(mean): --  RR: 20 (03 May 2021 09:54) (20 - 21)  SpO2: 91% (03 May 2021 09:54) (91% - 98%)    Gen: AOx3, NAD  CV: S1+S2 normal, no murmurs  Resp: Clear bilat, no resp distress  Abd: Soft, nontender, +BS  Ext: No LE edema, no wounds  : No Marino  IV/Skin: No thrombophlebitis  Neuro: no focal deficits    LABS:                          11.8   7.90  )-----------( 328      ( 03 May 2021 07:43 )             37.6       05-03    138  |  103  |  14  ----------------------------<  133<H>  5.2   |  23  |  0.74    Ca    9.5      03 May 2021 07:43  Phos  3.8     05-03  Mg     2.3     05-03    MICROBIOLOGY:  v  .Blood Blood-Peripheral  05-01-21   No growth to date.  --  --      .Blood Blood  04-26-21   No Growth Final  --  --      .Blood Blood-Peripheral  04-23-21   No Growth Final  --  --      .Blood Blood-Peripheral  04-13-21   No Growth Final  --  --    RADIOLOGY:    < from: CT Angio Chest w/ IV Cont (04.27.21 @ 19:27) >  IMPRESSION:  No pulmonary embolism.    Redemonstrated bilateral groundglass opacities and consolidations with interval increase in involvement of the bilateral upper lobes. Findings are consistent with patient's history of atypical/COVID infection.    Pneumomediastinum.    < end of copied text >

## 2021-05-03 NOTE — PROGRESS NOTE ADULT - PROBLEM SELECTOR PLAN 5
d/c losartan as pt thinks its contributes to her palpitations/tachycardia  will start metoprolol BID, uptitrated to 50 mg po BID 5/1   awaiting 2D echo  troponin neg  TSH wnl

## 2021-05-03 NOTE — PROGRESS NOTE ADULT - SUBJECTIVE AND OBJECTIVE BOX
PULMONARY PROGRESS NOTE    BISI AGUILAR  MRN-[id number]    Patient is a 60y old  Female who presents with a chief complaint of shortness of breath (03 May 2021 13:36)      HPI:  on room air  fatigued      ROS:   -    ACTIVE MEDICATION LIST:  MEDICATIONS  (STANDING):  ALBUTerol    90 MICROgram(s) HFA Inhaler 1 Puff(s) Inhalation every 4 hours  ALBUTerol    90 MICROgram(s) HFA Inhaler 2 Puff(s) Inhalation every 6 hours  aspirin enteric coated 81 milliGRAM(s) Oral daily  dextrose 40% Gel 15 Gram(s) Oral once  dextrose 5%. 1000 milliLiter(s) (50 mL/Hr) IV Continuous <Continuous>  dextrose 5%. 1000 milliLiter(s) (100 mL/Hr) IV Continuous <Continuous>  dextrose 50% Injectable 25 Gram(s) IV Push once  dextrose 50% Injectable 12.5 Gram(s) IV Push once  dextrose 50% Injectable 25 Gram(s) IV Push once  enoxaparin Injectable 65 milliGRAM(s) SubCutaneous every 12 hours  glucagon  Injectable 1 milliGRAM(s) IntraMuscular once  insulin glargine Injectable (LANTUS) 12 Unit(s) SubCutaneous at bedtime  insulin lispro (ADMELOG) corrective regimen sliding scale   SubCutaneous at bedtime  insulin lispro (ADMELOG) corrective regimen sliding scale   SubCutaneous three times a day before meals  insulin lispro Injectable (ADMELOG) 4 Unit(s) SubCutaneous three times a day before meals  ipratropium 17 MICROgram(s) HFA Inhaler 1 Puff(s) Inhalation every 6 hours  loratadine 10 milliGRAM(s) Oral daily  metoprolol tartrate 50 milliGRAM(s) Oral two times a day  PARoxetine 10 milliGRAM(s) Oral daily  polyethylene glycol 3350 17 Gram(s) Oral daily  senna 2 Tablet(s) Oral at bedtime    MEDICATIONS  (PRN):  acetaminophen   Tablet .. 650 milliGRAM(s) Oral every 6 hours PRN Temp greater or equal to 38C (100.4F), Mild Pain (1 - 3), Moderate Pain (4 - 6)  aluminum hydroxide/magnesium hydroxide/simethicone Suspension 30 milliLiter(s) Oral every 4 hours PRN Dyspepsia  benzonatate 100 milliGRAM(s) Oral every 8 hours PRN Cough  sodium chloride 0.65% Nasal 1 Spray(s) Both Nostrils two times a day PRN Congestion      EXAM:  Vital Signs Last 24 Hrs  T(C): 37.3 (03 May 2021 09:54), Max: 37.3 (03 May 2021 09:54)  T(F): 99.2 (03 May 2021 09:54), Max: 99.2 (03 May 2021 09:54)  HR: 112 (03 May 2021 09:54) (102 - 119)  BP: 126/80 (03 May 2021 09:54) (120/74 - 131/79)  BP(mean): --  RR: 20 (03 May 2021 09:54) (20 - 21)  SpO2: 91% (03 May 2021 09:54) (91% - 98%)    GENERAL: The patient is awake and alert in no apparent distress.     LUNGS:   respirations unlabored    HEART:  tachycardic                            11.8   7.90  )-----------( 328      ( 03 May 2021 07:43 )             37.6       05-03    138  |  103  |  14  ----------------------------<  133<H>  5.2   |  23  |  0.74    Ca    9.5      03 May 2021 07:43  Phos  3.8     05-03  Mg     2.3     05-03       < from: CT Angio Chest w/ IV Cont (04.27.21 @ 19:27) >    EXAM:  CT ANGIO CHEST (W)AW         PROCEDURE DATE:  Apr 27 2021         INTERPRETATION:  CLINICAL INFORMATION: Fever, tachycardia, COVID positive    COMPARISON: CTA chest 4/13/2021.    CONTRAST/COMPLICATIONS:  IV Contrast: Omnipaque 350  90 cc administered   10 cc discarded  Oral Contrast: NONE  Complications: None reported at time of study completion    PROCEDURE:  CT Angiography of the Chest.  Sagittal and coronal reformats were performed as well as 3D (MIP) reconstructions.    FINDINGS:    PULMONARY ARTERY: No pulmonary embolus in the main, left and right, lobar and segmental pulmonary arteries. Limited evaluation of the subsegmental pulmonary arteries secondary to suboptimal contrast bolus timing and motion artifact.  LUNGS AND AIRWAYS: Patent central airways. Redemonstrated bilateral patchy groundglass opacities and consolidations in all the lobes of the bilateral lungs with interval increase in involvement of the bilateral upper lobes.  PLEURA: No pleural effusion.  MEDIASTINUMAND MIAN: No lymphadenopathy. New small foci of air in the anterior mediastinum.  VESSELS: Bovine aortic arch.  HEART: Heart size is normal. No pericardial effusion.  CHEST WALL AND LOWER NECK: Within normal limits.  VISUALIZED UPPER ABDOMEN: Small hiatal hernia.  BONES: Degenerative changes.    IMPRESSION:  No pulmonary embolism.    Redemonstrated bilateral groundglass opacities and consolidations with interval increase in involvement of the bilateral upper lobes. Findings are consistent with patient's history of atypical/COVID infection.    Pneumomediastinum.            HARMONY DIEZ MD; Resident Radiology  This document has been electronically signed.  KENIA ROGERS MD; Attending Radiologist  This document has been electronically signed. Apr 28 2021 11:56AM    < end of copied text >      PROBLEM LIST:  60y Female with HEALTH ISSUES - PROBLEM Dx:  Acute respiratory failure with hypoxia  Acute respiratory failure with hypoxia    COVID-19  COVID-19    Type 2 diabetes mellitus without complication, unspecified whether long term insulin use  Type 2 diabetes mellitus without complication, unspecified whether long term insulin use    Hypertension, unspecified type  Hypertension, unspecified type    Hyperlipidemia, unspecified hyperlipidemia type  Hyperlipidemia, unspecified hyperlipidemia type    Need for prophylactic measure  Need for prophylactic measure    Suspected deep vein thrombosis (DVT)    Depression, unspecified depression type  Depression, unspecified depression type           RECS:  check ambulatory pulse ox  tachycardia work up as per cardio  s/p remdesivir  s/pe dexamethasone  continue lovenox  f/u with us in the office for formal pfts          Please call with any questions.    Jeana Richards,   [facility] Pulmonary/Sleep Medicine  [phone]

## 2021-05-03 NOTE — PROGRESS NOTE ADULT - PROBLEM SELECTOR PLAN 1
appreciate ID f/u, no evidence of secondary bacterial infection, off abx (received Zosyn x 3 days)   all blood cultures thus far negative, blood cultures repeated 5/1 - NGTD

## 2021-05-03 NOTE — PROGRESS NOTE ADULT - PROBLEM SELECTOR PLAN 3
- Repeat CT Angio chest w/ IV contrast-  no pulmonary embolus, on going multifocal pneumonia due to covid  -completed remdesivir and dexamethasone  -increasing d-dimer, will prophylactically start therapeutic Lovenox BID, repeat b/l LE dopplers pending  - pt appears lethargic today, no sedatives given, will have PT reevaluate patient prior to dc planning

## 2021-05-03 NOTE — PROGRESS NOTE ADULT - SUBJECTIVE AND OBJECTIVE BOX
Patient is a 60y old  Female who presents with a chief complaint of shortness of breath (02 May 2021 13:37)      SUBJECTIVE / OVERNIGHT EVENTS: No significant events overnight, Today AM pt appears lethargic and weak. Reports "feeling off." Denies dyspnea, chest pain N/V/D, fever, chills., new numbness or tingling    MEDICATIONS  (STANDING):  ALBUTerol    90 MICROgram(s) HFA Inhaler 1 Puff(s) Inhalation every 4 hours  ALBUTerol    90 MICROgram(s) HFA Inhaler 2 Puff(s) Inhalation every 6 hours  aspirin enteric coated 81 milliGRAM(s) Oral daily  dextrose 40% Gel 15 Gram(s) Oral once  dextrose 5%. 1000 milliLiter(s) (50 mL/Hr) IV Continuous <Continuous>  dextrose 5%. 1000 milliLiter(s) (100 mL/Hr) IV Continuous <Continuous>  dextrose 50% Injectable 25 Gram(s) IV Push once  dextrose 50% Injectable 12.5 Gram(s) IV Push once  dextrose 50% Injectable 25 Gram(s) IV Push once  enoxaparin Injectable 65 milliGRAM(s) SubCutaneous every 12 hours  glucagon  Injectable 1 milliGRAM(s) IntraMuscular once  insulin glargine Injectable (LANTUS) 12 Unit(s) SubCutaneous at bedtime  insulin lispro (ADMELOG) corrective regimen sliding scale   SubCutaneous at bedtime  insulin lispro (ADMELOG) corrective regimen sliding scale   SubCutaneous three times a day before meals  insulin lispro Injectable (ADMELOG) 4 Unit(s) SubCutaneous three times a day before meals  ipratropium 17 MICROgram(s) HFA Inhaler 1 Puff(s) Inhalation every 6 hours  loratadine 10 milliGRAM(s) Oral daily  metoprolol tartrate 50 milliGRAM(s) Oral two times a day  PARoxetine 10 milliGRAM(s) Oral daily  polyethylene glycol 3350 17 Gram(s) Oral daily  senna 2 Tablet(s) Oral at bedtime    MEDICATIONS  (PRN):  acetaminophen   Tablet .. 650 milliGRAM(s) Oral every 6 hours PRN Temp greater or equal to 38C (100.4F), Mild Pain (1 - 3), Moderate Pain (4 - 6)  aluminum hydroxide/magnesium hydroxide/simethicone Suspension 30 milliLiter(s) Oral every 4 hours PRN Dyspepsia  benzonatate 100 milliGRAM(s) Oral every 8 hours PRN Cough  sodium chloride 0.65% Nasal 1 Spray(s) Both Nostrils two times a day PRN Congestion        CAPILLARY BLOOD GLUCOSE      POCT Blood Glucose.: 89 mg/dL (03 May 2021 13:20)  POCT Blood Glucose.: 207 mg/dL (03 May 2021 08:50)  POCT Blood Glucose.: 198 mg/dL (02 May 2021 21:53)  POCT Blood Glucose.: 162 mg/dL (02 May 2021 18:11)  POCT Blood Glucose.: 110 mg/dL (02 May 2021 13:37)    I&O's Summary    02 May 2021 07:01  -  03 May 2021 07:00  --------------------------------------------------------  IN: 1000 mL / OUT: 800 mL / NET: 200 mL    Vital Signs Last 24 Hrs  T(C): 37.3 (03 May 2021 09:54), Max: 37.4 (02 May 2021 16:28)  T(F): 99.2 (03 May 2021 09:54), Max: 99.4 (02 May 2021 16:28)  HR: 112 (03 May 2021 09:54) (102 - 135)  BP: 126/80 (03 May 2021 09:54) (118/84 - 131/79)  BP(mean): --  RR: 20 (03 May 2021 09:54) (20 - 24)  SpO2: 91% (03 May 2021 09:54) (91% - 98%)    PHYSICAL EXAM  GENERAL: NAD, well-developed  HEAD:  Atraumatic, Normocephalic  EYES: EOMI, PERRLA, conjunctiva and sclera clear  NECK: Supple, No JVD  CHEST/LUNG: Clear to auscultation bilaterally; No wheeze  HEART: Regular rate and rhythm; No murmurs, rubs, or gallops  ABDOMEN: Soft, Nontender, Nondistended; Bowel sounds present  EXTREMITIES:  2+ Peripheral Pulses, No clubbing, cyanosis, or edema    LABS:                        11.8   7.90  )-----------( 328      ( 03 May 2021 07:43 )             37.6     05-03    138  |  103  |  14  ----------------------------<  133<H>  5.2   |  23  |  0.74    Ca    9.5      03 May 2021 07:43  Phos  3.8     05-03  Mg     2.3     05-03

## 2021-05-04 LAB
ANION GAP SERPL CALC-SCNC: 11 MMOL/L — SIGNIFICANT CHANGE UP (ref 7–14)
BUN SERPL-MCNC: 16 MG/DL — SIGNIFICANT CHANGE UP (ref 7–23)
CALCIUM SERPL-MCNC: 9.6 MG/DL — SIGNIFICANT CHANGE UP (ref 8.4–10.5)
CHLORIDE SERPL-SCNC: 103 MMOL/L — SIGNIFICANT CHANGE UP (ref 98–107)
CO2 SERPL-SCNC: 25 MMOL/L — SIGNIFICANT CHANGE UP (ref 22–31)
CREAT SERPL-MCNC: 0.78 MG/DL — SIGNIFICANT CHANGE UP (ref 0.5–1.3)
GLUCOSE BLDC GLUCOMTR-MCNC: 129 MG/DL — HIGH (ref 70–99)
GLUCOSE BLDC GLUCOMTR-MCNC: 188 MG/DL — HIGH (ref 70–99)
GLUCOSE BLDC GLUCOMTR-MCNC: 228 MG/DL — HIGH (ref 70–99)
GLUCOSE BLDC GLUCOMTR-MCNC: 245 MG/DL — HIGH (ref 70–99)
GLUCOSE SERPL-MCNC: 139 MG/DL — HIGH (ref 70–99)
HCT VFR BLD CALC: 36.6 % — SIGNIFICANT CHANGE UP (ref 34.5–45)
HGB BLD-MCNC: 11.5 G/DL — SIGNIFICANT CHANGE UP (ref 11.5–15.5)
MCHC RBC-ENTMCNC: 27.3 PG — SIGNIFICANT CHANGE UP (ref 27–34)
MCHC RBC-ENTMCNC: 31.4 GM/DL — LOW (ref 32–36)
MCV RBC AUTO: 86.7 FL — SIGNIFICANT CHANGE UP (ref 80–100)
NRBC # BLD: 0 /100 WBCS — SIGNIFICANT CHANGE UP
NRBC # FLD: 0.02 K/UL — HIGH
PLATELET # BLD AUTO: 469 K/UL — HIGH (ref 150–400)
POTASSIUM SERPL-MCNC: 4.4 MMOL/L — SIGNIFICANT CHANGE UP (ref 3.5–5.3)
POTASSIUM SERPL-SCNC: 4.4 MMOL/L — SIGNIFICANT CHANGE UP (ref 3.5–5.3)
RBC # BLD: 4.22 M/UL — SIGNIFICANT CHANGE UP (ref 3.8–5.2)
RBC # FLD: 16.1 % — HIGH (ref 10.3–14.5)
SODIUM SERPL-SCNC: 139 MMOL/L — SIGNIFICANT CHANGE UP (ref 135–145)
WBC # BLD: 8.5 K/UL — SIGNIFICANT CHANGE UP (ref 3.8–10.5)
WBC # FLD AUTO: 8.5 K/UL — SIGNIFICANT CHANGE UP (ref 3.8–10.5)

## 2021-05-04 PROCEDURE — 99233 SBSQ HOSP IP/OBS HIGH 50: CPT

## 2021-05-04 PROCEDURE — 99232 SBSQ HOSP IP/OBS MODERATE 35: CPT

## 2021-05-04 RX ORDER — ACETAMINOPHEN 500 MG
2 TABLET ORAL
Qty: 0 | Refills: 0 | DISCHARGE
Start: 2021-05-04

## 2021-05-04 RX ORDER — RIVAROXABAN 15 MG-20MG
1 KIT ORAL
Qty: 30 | Refills: 0
Start: 2021-05-04 | End: 2021-06-02

## 2021-05-04 RX ORDER — SODIUM CHLORIDE 0.65 %
1 AEROSOL, SPRAY (ML) NASAL
Qty: 0 | Refills: 0 | DISCHARGE
Start: 2021-05-04

## 2021-05-04 RX ADMIN — Medication 10 MILLIGRAM(S): at 13:04

## 2021-05-04 RX ADMIN — Medication 50 MILLIGRAM(S): at 05:48

## 2021-05-04 RX ADMIN — Medication 4 UNIT(S): at 13:04

## 2021-05-04 RX ADMIN — LORATADINE 10 MILLIGRAM(S): 10 TABLET ORAL at 11:47

## 2021-05-04 RX ADMIN — Medication 4: at 18:18

## 2021-05-04 RX ADMIN — Medication 30 MILLILITER(S): at 23:53

## 2021-05-04 RX ADMIN — Medication 4 UNIT(S): at 09:22

## 2021-05-04 RX ADMIN — Medication 2: at 13:03

## 2021-05-04 RX ADMIN — ALBUTEROL 2 PUFF(S): 90 AEROSOL, METERED ORAL at 09:23

## 2021-05-04 RX ADMIN — ENOXAPARIN SODIUM 65 MILLIGRAM(S): 100 INJECTION SUBCUTANEOUS at 17:19

## 2021-05-04 RX ADMIN — Medication 4 UNIT(S): at 18:19

## 2021-05-04 RX ADMIN — ENOXAPARIN SODIUM 65 MILLIGRAM(S): 100 INJECTION SUBCUTANEOUS at 05:48

## 2021-05-04 RX ADMIN — Medication 1 PUFF(S): at 04:15

## 2021-05-04 RX ADMIN — Medication 1 PUFF(S): at 09:23

## 2021-05-04 RX ADMIN — Medication 50 MILLIGRAM(S): at 17:19

## 2021-05-04 RX ADMIN — INSULIN GLARGINE 12 UNIT(S): 100 INJECTION, SOLUTION SUBCUTANEOUS at 21:54

## 2021-05-04 RX ADMIN — Medication 81 MILLIGRAM(S): at 11:47

## 2021-05-04 RX ADMIN — Medication 650 MILLIGRAM(S): at 15:44

## 2021-05-04 NOTE — PROGRESS NOTE ADULT - PROBLEM SELECTOR PLAN 2
O2sat 94% on RA, pending ambulatory oxygen  - pt tachycardic d/t deconditioning  continue supportive care  appreciate Pulm eval - the pneumomediastinum is small and can be followed up outpatient

## 2021-05-04 NOTE — PROGRESS NOTE ADULT - SUBJECTIVE AND OBJECTIVE BOX
CC: Patient is a 60y old  Female who presents with a chief complaint of shortness of breath (03 May 2021 17:23)    ID following for COVID PNA    Interval History/ROS: Patient remains on room air. No fevers, no chills. Remains with dyspnea on exertion.    Rest of ROS negative.    Allergies  erythromycin topical (Unknown)  Keflex (Unknown)    ANTIMICROBIALS:      OTHER MEDS:  acetaminophen   Tablet .. 650 milliGRAM(s) Oral every 6 hours PRN  ALBUTerol    90 MICROgram(s) HFA Inhaler 1 Puff(s) Inhalation every 4 hours  ALBUTerol    90 MICROgram(s) HFA Inhaler 2 Puff(s) Inhalation every 6 hours  aluminum hydroxide/magnesium hydroxide/simethicone Suspension 30 milliLiter(s) Oral every 4 hours PRN  aspirin enteric coated 81 milliGRAM(s) Oral daily  benzonatate 100 milliGRAM(s) Oral every 8 hours PRN  dextrose 40% Gel 15 Gram(s) Oral once  dextrose 5%. 1000 milliLiter(s) IV Continuous <Continuous>  dextrose 5%. 1000 milliLiter(s) IV Continuous <Continuous>  dextrose 50% Injectable 25 Gram(s) IV Push once  dextrose 50% Injectable 12.5 Gram(s) IV Push once  dextrose 50% Injectable 25 Gram(s) IV Push once  enoxaparin Injectable 65 milliGRAM(s) SubCutaneous every 12 hours  glucagon  Injectable 1 milliGRAM(s) IntraMuscular once  insulin glargine Injectable (LANTUS) 12 Unit(s) SubCutaneous at bedtime  insulin lispro (ADMELOG) corrective regimen sliding scale   SubCutaneous at bedtime  insulin lispro (ADMELOG) corrective regimen sliding scale   SubCutaneous three times a day before meals  insulin lispro Injectable (ADMELOG) 4 Unit(s) SubCutaneous three times a day before meals  ipratropium 17 MICROgram(s) HFA Inhaler 1 Puff(s) Inhalation every 6 hours  loratadine 10 milliGRAM(s) Oral daily  metoprolol tartrate 50 milliGRAM(s) Oral two times a day  PARoxetine 10 milliGRAM(s) Oral daily  polyethylene glycol 3350 17 Gram(s) Oral daily  senna 2 Tablet(s) Oral at bedtime  sodium chloride 0.65% Nasal 1 Spray(s) Both Nostrils two times a day PRN    PE:    Vital Signs Last 24 Hrs  T(C): 37.2 (04 May 2021 08:52), Max: 37.6 (03 May 2021 21:10)  T(F): 99 (04 May 2021 08:52), Max: 99.7 (03 May 2021 21:10)  HR: 120 (04 May 2021 11:17) (103 - 120)  BP: 115/75 (04 May 2021 08:52) (115/75 - 130/81)  BP(mean): --  RR: 18 (04 May 2021 11:17) (18 - 20)  SpO2: 87% (04 May 2021 11:17) (87% - 97%)    Gen: AOx3, NAD  CV: S1+S2 normal, no murmurs  Resp: Clear bilat, no resp distress  Abd: Soft, nontender, +BS  Ext: No LE edema, no wounds  : No Marino  IV/Skin: No thrombophlebitis  Neuro: no focal deficits    LABS:                          11.5   8.50  )-----------( 469      ( 04 May 2021 06:59 )             36.6       05-04    139  |  103  |  16  ----------------------------<  139<H>  4.4   |  25  |  0.78    Ca    9.6      04 May 2021 06:59  Phos  3.8     05-03  Mg     2.3     05-03            MICROBIOLOGY:  v  .Blood Blood-Peripheral  05-01-21   No growth to date.  --  --      .Blood Blood  04-26-21   No Growth Final  --  --      .Blood Blood-Peripheral  04-23-21   No Growth Final  --  --      .Blood Blood-Peripheral  04-13-21   No Growth Final  --  --    RADIOLOGY:    < from: CT Angio Chest w/ IV Cont (04.27.21 @ 19:27) >  IMPRESSION:  No pulmonary embolism.    Redemonstrated bilateral groundglass opacities and consolidations with interval increase in involvement of the bilateral upper lobes. Findings are consistent with patient's history of atypical/COVID infection.    Pneumomediastinum.    < end of copied text >

## 2021-05-04 NOTE — PROGRESS NOTE ADULT - SUBJECTIVE AND OBJECTIVE BOX
DATE OF SERVICE: 05-04-21     Patient is a 60y old  Female who presents with a chief complaint of shortness of breath (04 May 2021 13:57)      INTERVAL HISTORY: no events    	  MEDICATIONS:  metoprolol tartrate 50 milliGRAM(s) Oral two times a day        PHYSICAL EXAM:  T(C): 37.3 (05-04-21 @ 23:47), Max: 38 (05-04-21 @ 15:36)  HR: 108 (05-04-21 @ 23:47) (103 - 125)  BP: 139/98 (05-04-21 @ 23:47) (115/75 - 139/98)  RR: 18 (05-04-21 @ 23:47) (18 - 18)  SpO2: 96% (05-04-21 @ 23:47) (87% - 97%)  Wt(kg): --  I&O's Summary    04 May 2021 07:01  -  05 May 2021 00:45  --------------------------------------------------------  IN: 800 mL / OUT: 650 mL / NET: 150 mL                                   11.5   8.50  )-----------( 469      ( 04 May 2021 06:59 )             36.6     05-04    139  |  103  |  16  ----------------------------<  139<H>  4.4   |  25  |  0.78    Ca    9.6      04 May 2021 06:59  Phos  3.8     05-03  Mg     2.3     05-03          Labs personally reviewed      TTE CONCLUSIONS:  1. Normal mitral valve.  2. Normal trileaflet aortic valve.  3. Increased relative wall thickness with normal left  ventricular mass index, consistent with concentric left  ventricular remodeling.  4. Hyperdynamic left ventricle.  5. The right ventricle is not well visualized; grossly  normal right ventricular systolic function.    Assessment and Plan:   · Assessment	  60 year old female with T2DM A1c 7.8 (on Metformin), HTN and depression admitted to medicine for acute respiratory failure with hypoxia and sepsis 2/2 COVID 19.     Problem/Plan - 1:  ·  Problem: COVID-19.  Plan: - Repeat CT Angio chest w/ IV contrast-  no pulmonary embolus, on going multifocal pneumonia due to covid  -completed remdesivir and dexamethasone.   - still hypoxic requiring 3L oxygen  - Small pneumomediastinum - Pulm eval appreciated     Problem/Plan - 2:  ·  Problem: tachycardia   Plan: tachycardiac upto 130bpm, started Metoprolol 25mg BID and d/c Losartan  - troponin normal and echo pending to r/o COVID myocarditis   - TSH normal   - started metoprolol and uptitrated    Problem/Plan - 3:  ·  Problem: Type 2 diabetes mellitus without complication, unspecified whether long term insulin use.  Plan: -A1c 7.8  -Lispro low insulin sliding scale  -DASH and Consistent carbohydrate diet    Problem/Plan - 4:  ·  Problem: Hypertension, unspecified type.  Plan: -Holding Losartan 100mg qD, may need to restart at lower dose if BP uncontrolled  - Metoprolol started          Agustín Robertson DO [facility]  Cardiovascular Medicine  [address] [place] [address], [address]  Office: [phone]  Cell: [phone]

## 2021-05-04 NOTE — PROVIDER CONTACT NOTE (OTHER) - ASSESSMENT
, T 98.7, RR 20, O2 98% on 2L nasal cannula, /79. Patient is also due for albuterol and ipratropium inhalers now.
AOx4, O2sat low to mid 80s on 3L NC, increased to 5L NC.
Patient anxious, tachycardic, rectal temp 100.4. Tolerating room air with no SpO2 desaturation. patient refusing to perform ambulatory stats.
Pt AOx4, in no acute distress. resting in bed. denies headaches, dizziness or palpitations. /77 
patient a+ox4, vital signs stable this AM.
AOx4, on RA, in no acute distress. Resting in bed. pt denies any anxiety or palpitations at this time
No acute distress noted. Denies headache, dizziness, or pain.
pt c/o palpitations, tachycardic on pulse oximetry 125, VSS otherwise
Pt temp is 100.9F. asymptomatic
patient stated she is frustrated, anxious. wanted to use the bedpan.
pt HR tachy to 125 on pulse oximetry, VSS otherwise, pt c/o palpitations and feelings of anixety
patient SOB 24 rpm, o2 sat 96-99%, patient resting in bed.
AOx4, resting in bed, HR in the 130s. complaining of 6/10 back pain
Pt temp is 100.5F and HR is 119 bpm. asymptomatic
Pt HR is 125 bpm. Asymptomatic

## 2021-05-04 NOTE — PROGRESS NOTE ADULT - PROBLEM SELECTOR PLAN 5
d/c losartan as pt thinks its contributes to her palpitations/tachycardia  will start metoprolol BID, uptitrated to 50 mg po BID 5/1   awaiting 2D echo  troponin neg  TSH wnl d/c losartan as pt thinks its contributes to her palpitations/tachycardia  will start metoprolol BID, uptitrated to 50 mg po BID 5/1   Echo showed preserved EF with nl LV and RV fxn  troponin neg  TSH wnl

## 2021-05-04 NOTE — PROGRESS NOTE ADULT - SUBJECTIVE AND OBJECTIVE BOX
Patient is a 60y old  Female who presents with a chief complaint of shortness of breath (04 May 2021 11:58)      SUBJECTIVE / OVERNIGHT EVENTS: Today AM pt states feeling extremely weak. She is currently on RA saturating 94%. RN is awaiting to ambulate her on RA.    MEDICATIONS  (STANDING):  ALBUTerol    90 MICROgram(s) HFA Inhaler 1 Puff(s) Inhalation every 4 hours  ALBUTerol    90 MICROgram(s) HFA Inhaler 2 Puff(s) Inhalation every 6 hours  aspirin enteric coated 81 milliGRAM(s) Oral daily  dextrose 40% Gel 15 Gram(s) Oral once  dextrose 5%. 1000 milliLiter(s) (50 mL/Hr) IV Continuous <Continuous>  dextrose 5%. 1000 milliLiter(s) (100 mL/Hr) IV Continuous <Continuous>  dextrose 50% Injectable 25 Gram(s) IV Push once  dextrose 50% Injectable 12.5 Gram(s) IV Push once  dextrose 50% Injectable 25 Gram(s) IV Push once  enoxaparin Injectable 65 milliGRAM(s) SubCutaneous every 12 hours  glucagon  Injectable 1 milliGRAM(s) IntraMuscular once  insulin glargine Injectable (LANTUS) 12 Unit(s) SubCutaneous at bedtime  insulin lispro (ADMELOG) corrective regimen sliding scale   SubCutaneous three times a day before meals  insulin lispro (ADMELOG) corrective regimen sliding scale   SubCutaneous at bedtime  insulin lispro Injectable (ADMELOG) 4 Unit(s) SubCutaneous three times a day before meals  ipratropium 17 MICROgram(s) HFA Inhaler 1 Puff(s) Inhalation every 6 hours  loratadine 10 milliGRAM(s) Oral daily  metoprolol tartrate 50 milliGRAM(s) Oral two times a day  PARoxetine 10 milliGRAM(s) Oral daily  polyethylene glycol 3350 17 Gram(s) Oral daily  senna 2 Tablet(s) Oral at bedtime    MEDICATIONS  (PRN):  acetaminophen   Tablet .. 650 milliGRAM(s) Oral every 6 hours PRN Temp greater or equal to 38C (100.4F), Mild Pain (1 - 3), Moderate Pain (4 - 6)  aluminum hydroxide/magnesium hydroxide/simethicone Suspension 30 milliLiter(s) Oral every 4 hours PRN Dyspepsia  benzonatate 100 milliGRAM(s) Oral every 8 hours PRN Cough  sodium chloride 0.65% Nasal 1 Spray(s) Both Nostrils two times a day PRN Congestion        CAPILLARY BLOOD GLUCOSE      POCT Blood Glucose.: 188 mg/dL (04 May 2021 13:01)  POCT Blood Glucose.: 129 mg/dL (04 May 2021 09:13)  POCT Blood Glucose.: 229 mg/dL (03 May 2021 21:18)  POCT Blood Glucose.: 118 mg/dL (03 May 2021 18:08)    I&O's Summary    04 May 2021 07:01  -  04 May 2021 13:57  --------------------------------------------------------  IN: 0 mL / OUT: 400 mL / NET: -400 mL      Vital Signs Last 24 Hrs  T(C): 37.2 (04 May 2021 08:52), Max: 37.6 (03 May 2021 21:10)  T(F): 99 (04 May 2021 08:52), Max: 99.7 (03 May 2021 21:10)  HR: 120 (04 May 2021 11:17) (103 - 120)  BP: 115/75 (04 May 2021 08:52) (115/75 - 130/81)  BP(mean): --  RR: 18 (04 May 2021 11:17) (18 - 20)  SpO2: 87% (04 May 2021 11:17) (87% - 97%)    PHYSICAL EXAM  GENERAL: NAD, well-developed  HEAD:  Atraumatic, Normocephalic  EYES: EOMI, PERRLA, conjunctiva and sclera clear  NECK: Supple, No JVD  CHEST/LUNG: Clear to auscultation bilaterally; No wheeze  HEART: Regular rate and rhythm; No murmurs, rubs, or gallops  ABDOMEN: Soft, Nontender, Nondistended; Bowel sounds present  EXTREMITIES:  2+ Peripheral Pulses, No clubbing, cyanosis, or edema  LABS:                        11.5   8.50  )-----------( 469      ( 04 May 2021 06:59 )             36.6     05-04    139  |  103  |  16  ----------------------------<  139<H>  4.4   |  25  |  0.78    Ca    9.6      04 May 2021 06:59  Phos  3.8     05-03  Mg     2.3     05-03                RADIOLOGY & ADDITIONAL TESTS:    Imaging Personally Reviewed:    Consultant(s) Notes Reviewed:      Care Discussed with Consultants/Other Providers:

## 2021-05-04 NOTE — PROGRESS NOTE ADULT - PROBLEM SELECTOR PLAN 3
- Repeat CT Angio chest w/ IV contrast-  no pulmonary embolus, on going multifocal pneumonia due to covid  -completed remdesivir and dexamethasone  -increasing d-dimer, will prophylactically start therapeutic Lovenox BID, can dc repeat VA duplex given stability in O2  - pt appears lethargic today, no sedatives given, will have PT reevaluate patient prior to dc planning - Repeat CT Angio chest w/ IV contrast-  no pulmonary embolus, on going multifocal pneumonia due to covid  -completed remdesivir and dexamethasone  -increasing d-dimer, will prophylactically start therapeutic Lovenox BID, can dc repeat VA duplex given stability in O2  - per PT reevaluation recc home w/ PT  - pending ambulatory o2 sat to confirm pt can be dc'ed with 2L.  - will dc on xarelto for 30 days

## 2021-05-04 NOTE — PROGRESS NOTE ADULT - PROBLEM SELECTOR PLAN 4
-A1c 7.8  -Hold home oral hypoglycemic agents ( Metformin)  -Monitor blood sugars qAC and qHS  -Lispro low insulin sliding scale  -DASH and Consistent carbohydrate diet  -c/w Lantus 12 QHS/Admelog 4 TIDAC -A1c 7.8  -Hold home oral hypoglycemic agents ( Metformin)  -Monitor blood sugars qAC and qHS  -Lispro low insulin sliding scale  -DASH and Consistent carbohydrate diet  -c/w Lantus 12 QHS/Admelog 4 TIDAC  - can dc on metformin 1000 mg BID

## 2021-05-05 DIAGNOSIS — R50.9 FEVER, UNSPECIFIED: ICD-10-CM

## 2021-05-05 LAB
ANION GAP SERPL CALC-SCNC: 10 MMOL/L — SIGNIFICANT CHANGE UP (ref 7–14)
BASOPHILS # BLD AUTO: 0.07 K/UL — SIGNIFICANT CHANGE UP (ref 0–0.2)
BASOPHILS NFR BLD AUTO: 0.8 % — SIGNIFICANT CHANGE UP (ref 0–2)
BUN SERPL-MCNC: 17 MG/DL — SIGNIFICANT CHANGE UP (ref 7–23)
CALCIUM SERPL-MCNC: 9.6 MG/DL — SIGNIFICANT CHANGE UP (ref 8.4–10.5)
CHLORIDE SERPL-SCNC: 105 MMOL/L — SIGNIFICANT CHANGE UP (ref 98–107)
CO2 SERPL-SCNC: 24 MMOL/L — SIGNIFICANT CHANGE UP (ref 22–31)
CREAT SERPL-MCNC: 0.78 MG/DL — SIGNIFICANT CHANGE UP (ref 0.5–1.3)
CRP SERPL-MCNC: 15.2 MG/L — HIGH
D DIMER BLD IA.RAPID-MCNC: 1070 NG/ML DDU — HIGH
EOSINOPHIL # BLD AUTO: 0.31 K/UL — SIGNIFICANT CHANGE UP (ref 0–0.5)
EOSINOPHIL NFR BLD AUTO: 3.4 % — SIGNIFICANT CHANGE UP (ref 0–6)
FERRITIN SERPL-MCNC: 329 NG/ML — HIGH (ref 15–150)
GLUCOSE BLDC GLUCOMTR-MCNC: 117 MG/DL — HIGH (ref 70–99)
GLUCOSE BLDC GLUCOMTR-MCNC: 125 MG/DL — HIGH (ref 70–99)
GLUCOSE BLDC GLUCOMTR-MCNC: 186 MG/DL — HIGH (ref 70–99)
GLUCOSE BLDC GLUCOMTR-MCNC: 218 MG/DL — HIGH (ref 70–99)
GLUCOSE SERPL-MCNC: 145 MG/DL — HIGH (ref 70–99)
HCT VFR BLD CALC: 35.7 % — SIGNIFICANT CHANGE UP (ref 34.5–45)
HGB BLD-MCNC: 11.4 G/DL — LOW (ref 11.5–15.5)
IANC: 5.83 K/UL — SIGNIFICANT CHANGE UP (ref 1.5–8.5)
IMM GRANULOCYTES NFR BLD AUTO: 2.4 % — HIGH (ref 0–1.5)
LYMPHOCYTES # BLD AUTO: 1.74 K/UL — SIGNIFICANT CHANGE UP (ref 1–3.3)
LYMPHOCYTES # BLD AUTO: 18.9 % — SIGNIFICANT CHANGE UP (ref 13–44)
MAGNESIUM SERPL-MCNC: 2.3 MG/DL — SIGNIFICANT CHANGE UP (ref 1.6–2.6)
MCHC RBC-ENTMCNC: 27.4 PG — SIGNIFICANT CHANGE UP (ref 27–34)
MCHC RBC-ENTMCNC: 31.9 GM/DL — LOW (ref 32–36)
MCV RBC AUTO: 85.8 FL — SIGNIFICANT CHANGE UP (ref 80–100)
MONOCYTES # BLD AUTO: 1.05 K/UL — HIGH (ref 0–0.9)
MONOCYTES NFR BLD AUTO: 11.4 % — SIGNIFICANT CHANGE UP (ref 2–14)
NEUTROPHILS # BLD AUTO: 5.83 K/UL — SIGNIFICANT CHANGE UP (ref 1.8–7.4)
NEUTROPHILS NFR BLD AUTO: 63.1 % — SIGNIFICANT CHANGE UP (ref 43–77)
NRBC # BLD: 0 /100 WBCS — SIGNIFICANT CHANGE UP
NRBC # FLD: 0.05 K/UL — HIGH
PHOSPHATE SERPL-MCNC: 3.2 MG/DL — SIGNIFICANT CHANGE UP (ref 2.5–4.5)
PLATELET # BLD AUTO: 535 K/UL — HIGH (ref 150–400)
POTASSIUM SERPL-MCNC: 4.6 MMOL/L — SIGNIFICANT CHANGE UP (ref 3.5–5.3)
POTASSIUM SERPL-SCNC: 4.6 MMOL/L — SIGNIFICANT CHANGE UP (ref 3.5–5.3)
RBC # BLD: 4.16 M/UL — SIGNIFICANT CHANGE UP (ref 3.8–5.2)
RBC # FLD: 16.2 % — HIGH (ref 10.3–14.5)
SODIUM SERPL-SCNC: 139 MMOL/L — SIGNIFICANT CHANGE UP (ref 135–145)
WBC # BLD: 9.22 K/UL — SIGNIFICANT CHANGE UP (ref 3.8–10.5)
WBC # FLD AUTO: 9.22 K/UL — SIGNIFICANT CHANGE UP (ref 3.8–10.5)

## 2021-05-05 PROCEDURE — 99233 SBSQ HOSP IP/OBS HIGH 50: CPT

## 2021-05-05 PROCEDURE — 99233 SBSQ HOSP IP/OBS HIGH 50: CPT | Mod: GC

## 2021-05-05 PROCEDURE — 93970 EXTREMITY STUDY: CPT | Mod: 26

## 2021-05-05 RX ADMIN — Medication 1 PUFF(S): at 07:15

## 2021-05-05 RX ADMIN — Medication 4 UNIT(S): at 08:25

## 2021-05-05 RX ADMIN — LORATADINE 10 MILLIGRAM(S): 10 TABLET ORAL at 12:31

## 2021-05-05 RX ADMIN — Medication 30 MILLILITER(S): at 10:38

## 2021-05-05 RX ADMIN — ENOXAPARIN SODIUM 65 MILLIGRAM(S): 100 INJECTION SUBCUTANEOUS at 17:57

## 2021-05-05 RX ADMIN — INSULIN GLARGINE 12 UNIT(S): 100 INJECTION, SOLUTION SUBCUTANEOUS at 21:41

## 2021-05-05 RX ADMIN — Medication 81 MILLIGRAM(S): at 12:31

## 2021-05-05 RX ADMIN — Medication 4 UNIT(S): at 17:52

## 2021-05-05 RX ADMIN — ENOXAPARIN SODIUM 65 MILLIGRAM(S): 100 INJECTION SUBCUTANEOUS at 06:14

## 2021-05-05 RX ADMIN — Medication 10 MILLIGRAM(S): at 13:58

## 2021-05-05 RX ADMIN — Medication 4: at 17:51

## 2021-05-05 RX ADMIN — Medication 50 MILLIGRAM(S): at 17:57

## 2021-05-05 RX ADMIN — Medication 4 UNIT(S): at 12:27

## 2021-05-05 RX ADMIN — Medication 50 MILLIGRAM(S): at 06:14

## 2021-05-05 NOTE — PROGRESS NOTE ADULT - SUBJECTIVE AND OBJECTIVE BOX
PULMONARY PROGRESS NOTE    BISI AGUILAR  MRN-[id number]    Patient is a 60y old  Female who presents with a chief complaint of shortness of breath (03 May 2021 13:36)      HPI:  on nc  feeling ok    ROS:   -    MEDICATIONS  (STANDING):  ALBUTerol    90 MICROgram(s) HFA Inhaler 1 Puff(s) Inhalation every 4 hours  ALBUTerol    90 MICROgram(s) HFA Inhaler 2 Puff(s) Inhalation every 6 hours  aspirin enteric coated 81 milliGRAM(s) Oral daily  dextrose 40% Gel 15 Gram(s) Oral once  dextrose 5%. 1000 milliLiter(s) (50 mL/Hr) IV Continuous <Continuous>  dextrose 5%. 1000 milliLiter(s) (100 mL/Hr) IV Continuous <Continuous>  dextrose 50% Injectable 25 Gram(s) IV Push once  dextrose 50% Injectable 12.5 Gram(s) IV Push once  dextrose 50% Injectable 25 Gram(s) IV Push once  enoxaparin Injectable 65 milliGRAM(s) SubCutaneous every 12 hours  glucagon  Injectable 1 milliGRAM(s) IntraMuscular once  insulin glargine Injectable (LANTUS) 12 Unit(s) SubCutaneous at bedtime  insulin lispro (ADMELOG) corrective regimen sliding scale   SubCutaneous three times a day before meals  insulin lispro (ADMELOG) corrective regimen sliding scale   SubCutaneous at bedtime  insulin lispro Injectable (ADMELOG) 4 Unit(s) SubCutaneous three times a day before meals  ipratropium 17 MICROgram(s) HFA Inhaler 1 Puff(s) Inhalation every 6 hours  loratadine 10 milliGRAM(s) Oral daily  metoprolol tartrate 50 milliGRAM(s) Oral two times a day  PARoxetine 10 milliGRAM(s) Oral daily  polyethylene glycol 3350 17 Gram(s) Oral daily  senna 2 Tablet(s) Oral at bedtime    MEDICATIONS  (PRN):  acetaminophen   Tablet .. 650 milliGRAM(s) Oral every 6 hours PRN Temp greater or equal to 38C (100.4F), Mild Pain (1 - 3), Moderate Pain (4 - 6)  aluminum hydroxide/magnesium hydroxide/simethicone Suspension 30 milliLiter(s) Oral every 4 hours PRN Dyspepsia  benzonatate 100 milliGRAM(s) Oral every 8 hours PRN Cough  sodium chloride 0.65% Nasal 1 Spray(s) Both Nostrils two times a day PRN Congestion          EXAM:  Vital Signs Last 24 Hrs  T(C): 36.9 (05 May 2021 13:40), Max: 38 (04 May 2021 15:36)  T(F): 98.5 (05 May 2021 13:40), Max: 100.4 (04 May 2021 15:36)  HR: 100 (05 May 2021 13:40) (100 - 125)  BP: 125/75 (05 May 2021 13:40) (114/79 - 139/98)  BP(mean): --  RR: 22 (05 May 2021 14:30) (18 - 22)  SpO2: 86% (05 May 2021 14:30) (86% - 98%)    GENERAL: The patient is awake and alert in no apparent distress.     LUNGS:   respirations unlabored                            11.4   9.22  )-----------( 535      ( 05 May 2021 07:42 )             35.7   05-05    139  |  105  |  17  ----------------------------<  145<H>  4.6   |  24  |  0.78    Ca    9.6      05 May 2021 07:42  Phos  3.2     05-05  Mg     2.3     05-05         < from: CT Angio Chest w/ IV Cont (04.27.21 @ 19:27) >    EXAM:  CT ANGIO CHEST (W)AW IC        PROCEDURE DATE:  Apr 27 2021         INTERPRETATION:  CLINICAL INFORMATION: Fever, tachycardia, COVID positive    COMPARISON: CTA chest 4/13/2021.    CONTRAST/COMPLICATIONS:  IV Contrast: Omnipaque 350  90 cc administered   10 cc discarded  Oral Contrast: NONE  Complications: None reported at time of study completion    PROCEDURE:  CT Angiography of the Chest.  Sagittal and coronal reformats were performed as well as 3D (MIP) reconstructions.    FINDINGS:    PULMONARY ARTERY: No pulmonary embolus in the main, left and right, lobar and segmental pulmonary arteries. Limited evaluation of the subsegmental pulmonary arteries secondary to suboptimal contrast bolus timing and motion artifact.  LUNGS AND AIRWAYS: Patent central airways. Redemonstrated bilateral patchy groundglass opacities and consolidations in all the lobes of the bilateral lungs with interval increase in involvement of the bilateral upper lobes.  PLEURA: No pleural effusion.  MEDIASTINUMAND MIAN: No lymphadenopathy. New small foci of air in the anterior mediastinum.  VESSELS: Bovine aortic arch.  HEART: Heart size is normal. No pericardial effusion.  CHEST WALL AND LOWER NECK: Within normal limits.  VISUALIZED UPPER ABDOMEN: Small hiatal hernia.  BONES: Degenerative changes.    IMPRESSION:  No pulmonary embolism.    Redemonstrated bilateral groundglass opacities and consolidations with interval increase in involvement of the bilateral upper lobes. Findings are consistent with patient's history of atypical/COVID infection.    Pneumomediastinum.            HARMONY DIEZ MD; Resident Radiology  This document has been electronically signed.  KENIA ROGERS MD; Attending Radiologist  This document has been electronically signed. Apr 28 2021 11:56AM    < end of copied text >      PROBLEM LIST:  60y Female with HEALTH ISSUES - PROBLEM Dx:  Acute respiratory failure with hypoxia  Acute respiratory failure with hypoxia    COVID-19  COVID-19    Type 2 diabetes mellitus without complication, unspecified whether long term insulin use  Type 2 diabetes mellitus without complication, unspecified whether long term insulin use    Hypertension, unspecified type  Hypertension, unspecified type    Hyperlipidemia, unspecified hyperlipidemia type  Hyperlipidemia, unspecified hyperlipidemia type    Need for prophylactic measure  Need for prophylactic measure    Suspected deep vein thrombosis (DVT)    Depression, unspecified depression type  Depression, unspecified depression type           RECS:  check ambulatory pulse ox, will likely need home O2  s/p remdesivir  s/pe dexamethasone  continue lovenox  f/u with us in the office for formal pfts and repeat ct chest          Please call with any questions.    Marge Alex MD  [facility] Pulmonary/Sleep Medicine  [phone]

## 2021-05-05 NOTE — PROGRESS NOTE ADULT - PROBLEM SELECTOR PLAN 3
- Repeat CT Angio chest w/ IV contrast-  no pulmonary embolus, on going multifocal pneumonia due to covid  -completed remdesivir and dexamethasone  -increasing d-dimer, c/w therapeutic Lovenox BID, can dc repeat VA duplex given stability in O2  - per PT reevaluation recc home w/ PT  - pending ambulatory o2 sat to confirm pt can be dc'ed with 2L.  - will dc on xarelto for 30 days - Repeat CT Angio chest w/ IV contrast-  no pulmonary embolus, on going multifocal pneumonia due to covid  -completed remdesivir and dexamethasone  -increasing d-dimer, c/w therapeutic Lovenox BID, will check repeat VA duplex to r/o DVT  - pending ambulatory o2 sat to confirm pt can be dc'ed with 2L.  - will dc on xarelto for 30 days

## 2021-05-05 NOTE — PROGRESS NOTE ADULT - PROBLEM SELECTOR PLAN 5
d/c losartan as pt thinks its contributes to her palpitations/tachycardia  will start metoprolol BID, uptitrated to 50 mg po BID 5/1   Echo showed preserved EF with nl LV and RV fxn  troponin neg  TSH wnl Lostartan had been d/miguel angel as pt thinks its contributes to her palpitations/tachycardia  C/w metoprolol BID, uptitrated to 50 mg po BID 5/1   Echo showed preserved EF with nl LV and RV fxn  troponin neg  TSH wnl

## 2021-05-05 NOTE — PROGRESS NOTE ADULT - SUBJECTIVE AND OBJECTIVE BOX
Deangelo Leonard MD, MPH, PGY1  Pager [phone]  *Please page Night Float after 7 PM*    PROGRESS NOTE:     Patient is a 60y old  Female who presents with a chief complaint of shortness of breath (04 May 2021 13:57)      SUBJECTIVE / OVERNIGHT EVENTS: No acute events overnight. Patient transferred to  from [address]. Patient denies any acute complaints other than generalized weakness and shortness of breath with activity which has been ongoing. Denies chest pain, abdominal pain, n/v.      MEDICATIONS  (STANDING):  ALBUTerol    90 MICROgram(s) HFA Inhaler 1 Puff(s) Inhalation every 4 hours  ALBUTerol    90 MICROgram(s) HFA Inhaler 2 Puff(s) Inhalation every 6 hours  aspirin enteric coated 81 milliGRAM(s) Oral daily  dextrose 40% Gel 15 Gram(s) Oral once  dextrose 5%. 1000 milliLiter(s) (50 mL/Hr) IV Continuous <Continuous>  dextrose 5%. 1000 milliLiter(s) (100 mL/Hr) IV Continuous <Continuous>  dextrose 50% Injectable 25 Gram(s) IV Push once  dextrose 50% Injectable 12.5 Gram(s) IV Push once  dextrose 50% Injectable 25 Gram(s) IV Push once  enoxaparin Injectable 65 milliGRAM(s) SubCutaneous every 12 hours  glucagon  Injectable 1 milliGRAM(s) IntraMuscular once  insulin glargine Injectable (LANTUS) 12 Unit(s) SubCutaneous at bedtime  insulin lispro (ADMELOG) corrective regimen sliding scale   SubCutaneous three times a day before meals  insulin lispro (ADMELOG) corrective regimen sliding scale   SubCutaneous at bedtime  insulin lispro Injectable (ADMELOG) 4 Unit(s) SubCutaneous three times a day before meals  ipratropium 17 MICROgram(s) HFA Inhaler 1 Puff(s) Inhalation every 6 hours  loratadine 10 milliGRAM(s) Oral daily  metoprolol tartrate 50 milliGRAM(s) Oral two times a day  PARoxetine 10 milliGRAM(s) Oral daily  polyethylene glycol 3350 17 Gram(s) Oral daily  senna 2 Tablet(s) Oral at bedtime    MEDICATIONS  (PRN):  acetaminophen   Tablet .. 650 milliGRAM(s) Oral every 6 hours PRN Temp greater or equal to 38C (100.4F), Mild Pain (1 - 3), Moderate Pain (4 - 6)  aluminum hydroxide/magnesium hydroxide/simethicone Suspension 30 milliLiter(s) Oral every 4 hours PRN Dyspepsia  benzonatate 100 milliGRAM(s) Oral every 8 hours PRN Cough  sodium chloride 0.65% Nasal 1 Spray(s) Both Nostrils two times a day PRN Congestion      CAPILLARY BLOOD GLUCOSE      POCT Blood Glucose.: 125 mg/dL (05 May 2021 08:22)  POCT Blood Glucose.: 228 mg/dL (04 May 2021 21:03)  POCT Blood Glucose.: 245 mg/dL (04 May 2021 18:05)  POCT Blood Glucose.: 188 mg/dL (04 May 2021 13:01)  POCT Blood Glucose.: 129 mg/dL (04 May 2021 09:13)    I&O's Summary    04 May 2021 07:01  -  05 May 2021 07:00  --------------------------------------------------------  IN: 800 mL / OUT: 650 mL / NET: 150 mL        PHYSICAL EXAM:  Vital Signs Last 24 Hrs  T(C): 36.9 (05 May 2021 06:14), Max: 38 (04 May 2021 15:36)  T(F): 98.5 (05 May 2021 06:14), Max: 100.4 (04 May 2021 15:36)  HR: 110 (05 May 2021 06:14) (104 - 125)  BP: 114/79 (05 May 2021 06:14) (114/79 - 139/98)  BP(mean): --  RR: 18 (05 May 2021 06:14) (18 - 18)  SpO2: 98% (05 May 2021 06:14) (87% - 98%)    CONSTITUTIONAL: NAD, well-developed  RESPIRATORY: Normal respiratory effort; lungs are clear to auscultation bilaterally  CARDIOVASCULAR: Regular rate and rhythm, normal S1 and S2, no murmur/rub/gallop; No lower extremity edema;  ABDOMEN: Nontender to palpation, normoactive bowel sounds, no rebound/guarding  MUSCLOSKELETAL: no clubbing or cyanosis of digits; no joint swelling or tenderness to palpation  PSYCH: A+O to person, place, and time; affect appropriate    LABS:                        11.4   9.22  )-----------( 535      ( 05 May 2021 07:42 )             35.7     05-05    139  |  105  |  17  ----------------------------<  145<H>  4.6   |  24  |  0.78    Ca    9.6      05 May 2021 07:42  Phos  3.2     05-05  Mg     2.3     05-05                  RADIOLOGY & ADDITIONAL TESTS:  Results Reviewed:   Imaging Personally Reviewed:  Electrocardiogram Personally Reviewed:    COORDINATION OF CARE:  Care Discussed with Consultants/Other Providers [Y/N]:  Prior or Outpatient Records Reviewed [Y/N]:

## 2021-05-05 NOTE — PROGRESS NOTE ADULT - PROBLEM SELECTOR PLAN 4
-A1c 7.8  -Hold home oral hypoglycemic agents ( Metformin)  -Monitor blood sugars qAC and qHS  -Lispro low insulin sliding scale  -DASH and Consistent carbohydrate diet  -c/w Lantus 12 QHS/Admelog 4 TIDAC  - can dc on metformin 1000 mg BID

## 2021-05-05 NOTE — PROGRESS NOTE ADULT - ATTENDING COMMENTS
Admitted for COVID remains hospitalized for persistent fevers and hypoxia. Cultures repeatedly have been negative.  Today she is reporting fatigue which is improving and occasional dizziness on ambulation.   Will obtain doppler and ambulatory saturations   Dispo: Home with home PT and likely supplemental O2

## 2021-05-05 NOTE — PROGRESS NOTE ADULT - PROBLEM SELECTOR PLAN 2
O2sat 94% on RA, pending ambulatory oxygen  - pt tachycardic d/t deconditioning  continue supportive care  Patient with pneumomediastinum seen on CT Chest 4/27, pulm following appreciate recs. Can likely be followed as o/p O2sat 94% on RA, pending ambulatory oxygen. Will check amb O2 sat today  - pt likely tachycardic d/t deconditioning  continue supportive care  Patient with pneumomediastinum seen on CT Chest 4/27, pulm following appreciate recs. Can likely be followed as o/p

## 2021-05-05 NOTE — PROGRESS NOTE ADULT - PROBLEM SELECTOR PLAN 1
Patient with intermittent fevers, most recently 5/4 afternoon to 100.4  ID following, appreciate continued recs  No evidence so far of secondary bacterial infection, patient received 3 days of Zosyn (4/27-4/29)  BCx negative to date, most recently 5/1 Patient with intermittent low grade fevers, most recently 5/4 afternoon to 100.4  ID following, appreciate continued recs  No evidence so far of secondary bacterial infection, patient received 3 days of Zosyn (4/27-4/29)  BCx negative to date, most recently 5/1

## 2021-05-06 ENCOUNTER — TRANSCRIPTION ENCOUNTER (OUTPATIENT)
Age: 61
End: 2021-05-06

## 2021-05-06 VITALS
DIASTOLIC BLOOD PRESSURE: 79 MMHG | SYSTOLIC BLOOD PRESSURE: 120 MMHG | HEART RATE: 97 BPM | TEMPERATURE: 99 F | RESPIRATION RATE: 18 BRPM | OXYGEN SATURATION: 98 %

## 2021-05-06 LAB
CULTURE RESULTS: SIGNIFICANT CHANGE UP
CULTURE RESULTS: SIGNIFICANT CHANGE UP
GLUCOSE BLDC GLUCOMTR-MCNC: 118 MG/DL — HIGH (ref 70–99)
GLUCOSE BLDC GLUCOMTR-MCNC: 142 MG/DL — HIGH (ref 70–99)
SPECIMEN SOURCE: SIGNIFICANT CHANGE UP
SPECIMEN SOURCE: SIGNIFICANT CHANGE UP

## 2021-05-06 PROCEDURE — 99239 HOSP IP/OBS DSCHRG MGMT >30: CPT | Mod: GC

## 2021-05-06 RX ORDER — METFORMIN HYDROCHLORIDE 850 MG/1
1 TABLET ORAL
Qty: 0 | Refills: 0 | DISCHARGE

## 2021-05-06 RX ORDER — RIVAROXABAN 15 MG-20MG
1 KIT ORAL
Qty: 30 | Refills: 0
Start: 2021-05-06 | End: 2021-06-04

## 2021-05-06 RX ORDER — LOSARTAN POTASSIUM 100 MG/1
1 TABLET, FILM COATED ORAL
Qty: 0 | Refills: 0 | DISCHARGE

## 2021-05-06 RX ORDER — ALBUTEROL 90 UG/1
2 AEROSOL, METERED ORAL
Qty: 8.5 | Refills: 0
Start: 2021-05-06 | End: 2021-06-04

## 2021-05-06 RX ORDER — METFORMIN HYDROCHLORIDE 850 MG/1
1 TABLET ORAL
Qty: 60 | Refills: 0
Start: 2021-05-06 | End: 2021-06-04

## 2021-05-06 RX ORDER — METOPROLOL TARTRATE 50 MG
1 TABLET ORAL
Qty: 60 | Refills: 0
Start: 2021-05-06 | End: 2021-06-04

## 2021-05-06 RX ORDER — ASPIRIN/CALCIUM CARB/MAGNESIUM 324 MG
1 TABLET ORAL
Qty: 0 | Refills: 0 | DISCHARGE

## 2021-05-06 RX ADMIN — ENOXAPARIN SODIUM 65 MILLIGRAM(S): 100 INJECTION SUBCUTANEOUS at 06:02

## 2021-05-06 RX ADMIN — POLYETHYLENE GLYCOL 3350 17 GRAM(S): 17 POWDER, FOR SOLUTION ORAL at 12:30

## 2021-05-06 RX ADMIN — Medication 4 UNIT(S): at 08:49

## 2021-05-06 RX ADMIN — Medication 10 MILLIGRAM(S): at 12:31

## 2021-05-06 RX ADMIN — LORATADINE 10 MILLIGRAM(S): 10 TABLET ORAL at 12:31

## 2021-05-06 RX ADMIN — Medication 50 MILLIGRAM(S): at 06:02

## 2021-05-06 RX ADMIN — Medication 4 UNIT(S): at 12:30

## 2021-05-06 RX ADMIN — Medication 81 MILLIGRAM(S): at 12:31

## 2021-05-06 NOTE — PROGRESS NOTE ADULT - PROBLEM SELECTOR PROBLEM 1
Acute respiratory failure with hypoxia
R/O Fever
R/O Fever
Acute respiratory failure with hypoxia
Acute respiratory failure with hypoxia
R/O Fever
Acute respiratory failure with hypoxia
Fever
Fever
R/O Fever
Acute respiratory failure with hypoxia
Acute respiratory failure with hypoxia
R/O Fever
Acute respiratory failure with hypoxia
Acute respiratory failure with hypoxia

## 2021-05-06 NOTE — PROGRESS NOTE ADULT - PROBLEM SELECTOR PLAN 3
- Repeat CT Angio chest w/ IV contrast-  no pulmonary embolus, on going multifocal pneumonia due to covid  -completed remdesivir and dexamethasone  -increasing d-dimer, c/w therapeutic Lovenox BID, will check repeat VA duplex to r/o DVT  - pending ambulatory o2 sat to confirm pt can be dc'ed with 2L.  - will dc on xarelto for 30 days

## 2021-05-06 NOTE — PROGRESS NOTE ADULT - NSICDXPILOT_GEN_ALL_CORE
[place]

## 2021-05-06 NOTE — PROGRESS NOTE ADULT - PROVIDER SPECIALTY LIST ADULT
Cardiology
Infectious Disease
Pulmonology
Cardiology
Hospitalist
Hospitalist
Infectious Disease
Infectious Disease
Pulmonology
Cardiology
Hospitalist
Hospitalist
Infectious Disease
Cardiology
Infectious Disease
Pulmonology
Hospitalist
Internal Medicine
Hospitalist
Internal Medicine

## 2021-05-06 NOTE — PROGRESS NOTE ADULT - PROBLEM SELECTOR PLAN 2
O2sat 94% on RA, pending ambulatory oxygen. Will check amb O2 sat today  - pt likely tachycardic d/t deconditioning  continue supportive care  Patient with pneumomediastinum seen on CT Chest 4/27, pulm following appreciate recs. Can likely be followed as o/p Patient desatting to 86% on RA while ambulating   - pt likely tachycardic d/t deconditioning  -Will require d/c with home O2  continue supportive care  Patient with pneumomediastinum seen on CT Chest 4/27, pulm following appreciate recs. Can likely be followed as o/p

## 2021-05-06 NOTE — DISCHARGE NOTE NURSING/CASE MANAGEMENT/SOCIAL WORK - NSDCFUADDAPPT_GEN_ALL_CORE_FT
Please follow up with your primary care provider in 1 to 2 weeks for further care. If you don't have a primary care provider please follow up at our Medicine Clinic at  [facility] [place] [address] [phone] or [phone]  (please call to make appointment)

## 2021-05-06 NOTE — PROGRESS NOTE ADULT - PROBLEM SELECTOR PROBLEM 7
Depression, unspecified depression type
Need for prophylactic measure
Depression, unspecified depression type
Need for prophylactic measure
Need for prophylactic measure
Depression, unspecified depression type
Depression, unspecified depression type
Need for prophylactic measure
Depression, unspecified depression type
Need for prophylactic measure
Depression, unspecified depression type
Need for prophylactic measure
Depression, unspecified depression type
Need for prophylactic measure
Depression, unspecified depression type

## 2021-05-06 NOTE — PROVIDER CONTACT NOTE (OTHER) - DATE AND TIME:
28-Apr-2021 21:00
29-Apr-2021 12:00
06-May-2021 06:23
27-Apr-2021 22:27
29-Apr-2021 05:50
22-Apr-2021 14:10
26-Apr-2021 11:55
02-May-2021 16:50
21-Apr-2021 11:40
23-Apr-2021 18:10
04-May-2021 16:06
29-Apr-2021 00:18
29-Apr-2021 14:30
29-Apr-2021 22:40
23-Apr-2021 13:50
29-Apr-2021 22:10

## 2021-05-06 NOTE — PROVIDER CONTACT NOTE (OTHER) - SITUATION
Patient refuses blood work this morning because she is being discharged
Pt HR is 125 bpm
patient refusing labs this AM. also refusing losartan, states it makes her feel "strange", that it's making her sick.
patient c/o feeling anxious, HR on [id number]
Pt temp I s100.5F and HR is 119 bpm
pt HR sustaining in the mid 120s
Patient's HR is 122
Pt Desat on 3L NC to low 80s, taking a while to climb back up when increased to 5L NC. Pt also states she has palpitations, and that she has taken metoprolol in the past.
Pt temp is 100.9F
patient heart rate sustaining 120-130s.
pt HR tachy to 125 on pulse oximetry, VSS otherwise, pt c/o palpitations and feelings of anixety
Patient tachycardic into the 120's. Patient rectal temperature 100.4
Pt . Tylenol was given for pain in her neck and back.
Pt HR is sustaining 144 at rest.
pt c/o palpitations, tachy to 125 bpm

## 2021-05-06 NOTE — PROVIDER CONTACT NOTE (OTHER) - BACKGROUND
covid +
61 y/o F admitted +COVID
59 y/o F admitted +COVID
Covid +
Pt was admitted for COVID 19
Pt was admitted for COVID 19
Patient A&Ox4. Patient PMH depression, DM, HTN, COVID positive, now on room air.
Patient admitted with Covid 19+.  Hx of depression, DM, htn
covid+; history of anxiety/depression. previous fall during this admission.
covid+; history of anxiety/depression. previous fall during this admission.
patient admitted with Covid 19
COVID +
Pt was admitted for COVID 19
admitted for covid19. hx of t2DM, HTN, depression
covid+; history of anxiety/depression, DM, HTN.
covid+; history of anxiety/depression. previous fall during this admission.

## 2021-05-06 NOTE — PROVIDER CONTACT NOTE (OTHER) - NAME OF MD/NP/PA/DO NOTIFIED:
MATTIE Chatterjee #[id number]
emily MARX
Em GAMBINO)
ACP Effinger aware
ACP Va
ACP tony
Dr. Musa notified
Em GAMBINO)
ACP [place]inger [id number] aware
ACP Rocio Arboleda
AXCP Effinger aware
ACP Alysia
Alex MARX
ACP Alysia
Kevin ACP
Em PHELPS

## 2021-05-06 NOTE — PROVIDER CONTACT NOTE (OTHER) - RECOMMENDATIONS
BC, UC, Tylenol
Monitor
patient given PO Tylenol and encouraged to drink fluids.
EKG (sinus tach), PRN Lorazepam for anxiety, continue to monitor
notify provider
Hold off on inhalers
notify provider
Monitor
notify provider
Dr. Musa notified
notify provider
ACP Rocio Arboleda notified
EKG, PRN Lorazepam, continue to monitor
patient encouraged to limit phone calls, cont deep breathing exercises, low lighting in room. PA made aware.

## 2021-05-06 NOTE — PROVIDER CONTACT NOTE (OTHER) - ACTION/TREATMENT ORDERED:
provider aware, given NS bolus. will order EKG & ambulatory O2sats w/ HR.
provider aware, repeat Vital signs in a hour
ACP ordered rectal temp
Provider made aware, per provider give PO tylenol and encourage pt to drink fluids. Call back in tachycardia not resolving. Provider made aware patient refusing ambulatory sats at this time as well.
Risks and benefits explained, but patient continue to refuse
provider aware, allow pt to acclimate to increase in O2, will order metoprolol for pt
EKG, PRN Lorazepam, continue to monitor
provider aware. will order D-dimer as add on lab results. pending d-dimer results may repeat CTA scan. will put PRN dose of lopressor for HR > 150
Hold off on tonight's dose of inhalers. Bump up nasal cannula to 3L.
full set of vitals, rectal temp, and text page provider with result of rectal.
PRN Lorazepam given with relief, pt refused EKG, ACP aware
ACP Rocio Arboleda aware. No interventions at this time. Will continue to monitor.
pending xanax as previously ordered, EKG, cont to monitor V/S
rescheduled the losartan for later today. plan to retry labs 4/30.
ACP said to recheck temperature at 0300 and if pt is still febrile, administer tylenol per order.
Tylenol only

## 2021-05-06 NOTE — DISCHARGE NOTE NURSING/CASE MANAGEMENT/SOCIAL WORK - PATIENT PORTAL LINK FT
You can access the FollowMyHealth Patient Portal offered by [facility] by registering at the following website: [email or web address]. By joining [facility]’s FollowMyHealth portal, you will also be able to view your health information using other applications (apps) compatible with our system.

## 2021-05-06 NOTE — PROGRESS NOTE ADULT - PROBLEM SELECTOR PROBLEM 3
COVID-19
COVID-19
Type 2 diabetes mellitus without complication, unspecified whether long term insulin use
COVID-19
COVID-19
Type 2 diabetes mellitus without complication, unspecified whether long term insulin use
COVID-19
COVID-19
Type 2 diabetes mellitus without complication, unspecified whether long term insulin use
Type 2 diabetes mellitus without complication, unspecified whether long term insulin use
COVID-19
COVID-19
Type 2 diabetes mellitus without complication, unspecified whether long term insulin use
COVID-19
Type 2 diabetes mellitus without complication, unspecified whether long term insulin use
COVID-19

## 2021-05-06 NOTE — PROGRESS NOTE ADULT - ATTENDING COMMENTS
Admitted for COVID remains hospitalized for persistent fevers and hypoxia. Cultures repeatedly have been negative.  Today she is reporting fatigue, no mor dizziness. LE dopplers negative   Dispo: Home with home PT and supplemental O2. She will also go on 30d of Xarelto post discharge. Admitted for COVID remains hospitalized for persistent fevers and hypoxia. Cultures repeatedly have been negative.  Today she is reporting fatigue, no mor dizziness. LE dopplers negative   Dispo: Home with home PT and supplemental O2. She will also go on 30d of Xarelto post discharge. d/c planning 36 min

## 2021-05-06 NOTE — PROGRESS NOTE ADULT - PROBLEM SELECTOR PLAN 5
Lostartan had been d/miguel angel as pt thinks its contributes to her palpitations/tachycardia  C/w metoprolol BID, uptitrated to 50 mg po BID 5/1   Echo showed preserved EF with nl LV and RV fxn  troponin neg  TSH wnl

## 2021-05-06 NOTE — PROGRESS NOTE ADULT - PROBLEM SELECTOR PLAN 8
-DVT ppx: Lovenox 40mg SQ qD
-DVT ppx: Lovenox 40mg SQ qD  - PMD Dr. Eid aware    Updated pt  Bebo on 4/22, hopeful d/c in next few days
-DVT ppx: started prophylactically on therapeutic Lovenox
-DVT ppx: Lovenox 40mg SQ qD    D/w  Bebo today 5/1
-DVT ppx: Lovenox 40mg SQ qD  - PMD Dr. Eid aware    Updated pt  Bebo on 4/22, hopeful d/c in next few days
-DVT ppx: started prophylactically on therapeutic Lovenox     D/w  Bebo today 5/2  DC planning tomorrow if blood cultures remain neg, and cleared by ID/Cards
-DVT ppx: Lovenox 40mg SQ qD  - PMD Dr. Eid aware    Updated pt  Bebo on 4/22, hopeful d/c in next few days
-DVT ppx: started prophylactically on therapeutic Lovenox

## 2021-05-06 NOTE — PROGRESS NOTE ADULT - ASSESSMENT
60 year old female with T2DM A1c 7.8 (on Metformin), HTN and depression admitted to medicine for acute respiratory failure with hypoxia and sepsis 2/2 COVID 19. 
60 year old female with T2DM A1c 7.8 (on Metformin), HTN and depression presents with worsening shortness of breath and fatigue, hypoxic to the 80s, admitted to medicine for acute respiratory failure with hypoxia and sepsis 2/2 COVID 19. 
60 year old female with PMhx of T2DM (on Metformin), HTN and depression presents with worsening shortness of breath and fatigue.  Admitted on 4/13 with COVID, known tested positive 4 days before admission.   Currently on NC 3L, did not change much since admission.  s/p remdesivir 4/13-17  s/p Dexamethasone 4/13-22  Spiking fever since 4/23-26.  ID consulted for fever.  CTA no PE no s/s bacterial consolidation  UA neg  Blood Cx so far negative  ? etiology of pneumomediastinum -?small pneumothorax- if worsens ? thoracic surgery eval  Rec:  1) follow Cx  2) Follow clinically  3) For now continue zosyn-if no growth on Cx would Dc abx tomorrow  monitor oxygenation and supplementa s needed    Will tailor plan for ID issues  per course,results.Will defer to primary team on management of other issues.  Assessment, plan and recommendations as detailed above were discussed with the medical/primary  team-Dr Domínguez.  Will Follow.  Beeper [phone] [facility] [id number].   Wknd/afterhours/No response-[phone] or Fellow on call .
60 year old female with PMhx of T2DM (on Metformin), HTN and depression presents with worsening shortness of breath and fatigue.  Admitted on 4/13 with COVID, known tested positive 4 days before admission.   Currently on NC 3L, did not change much since admission.  s/p remdesivir 4/13-17  s/p Dexamethasone 4/13-22  Still with fever  CTA no PE no s/s bacterial consolidation, but has presence of pneumomediastinum  UA neg  Blood Cx so far negative  Overall,  1) COVID  - Supportive care, s/p RemD and Dexa  - O2 supplementation per primary team  2) Fever  - Reactive to COVID/Pneumomediastinum? Infectious workup negative presently  - F/U pending cultures  - Monitor for focal source  3) Pneumomediastinum  - Thought related to pneumothorax?  - Monitor, consider CTS if worsening    My colleagues will be covering this patient starting on 5/1/21.  Please call [phone] or on call fellow with any questions or change in status.     Kip Sheikh MD  Pager [phone]  After 5pm and on weekends call [phone]
60 year old female with T2DM A1c 7.8 (on Metformin), HTN and depression admitted to medicine for acute respiratory failure with hypoxia and sepsis 2/2 COVID 19. 
60 year old female with T2DM A1c 7.8 (on Metformin), HTN and depression admitted to medicine for acute respiratory failure with hypoxia and sepsis 2/2 COVID 19. 
60 year old female with PMhx of T2DM (on Metformin), HTN and depression presents with worsening shortness of breath and fatigue.  Admitted on 4/13 with COVID, known tested positive 4 days before admission.   Currently on NC 3L, did not change much since admission.  s/p remdesivir 4/13-17  s/p Dexamethasone 4/13-22  Spiking fever since 4/23-26.  ID consulted for fever.  CTA no PE no s/s bacterial consolidation  UA neg  Blood Cx so far negative  ? etiology of pneumomediastinum -?small pneumothorax- if worsens ? thoracic surgery eval  Rec:  1) follow Cx  2) Follow clinically  3) Dc zosyna nd observe   monitor oxygenation and supplementa s needed  prognosis guarded    Will tailor plan for ID issues  per course,results.Will defer to primary team on management of other issues.  Assessment, plan and recommendations as detailed above were discussed with the medical/primary  team-Dr Domínguez.  I will be at [facility] starting tomorrow.  My colleagues in ID service will cover ,follow and assume care of ID issues.  Please call ID fellow on call via [employer](or [phone] if unreachable)  if issues.  
60 year old female with T2DM A1c 7.8 (on Metformin), HTN and depression admitted to medicine for acute respiratory failure with hypoxia and sepsis 2/2 COVID 19. 
60 year old female with PMhx of T2DM (on Metformin), HTN and depression presents with worsening shortness of breath and fatigue.  Admitted on 4/13 with COVID, known tested positive 4 days before admission.   Now on room air.  s/p remdesivir 4/13-17  s/p Dexamethasone 4/13-22  CTA no PE no s/s bacterial consolidation, but has presence of pneumomediastinum  UA neg  Blood Cx so far negative  Fevers resolved    Overall,  1) COVID  - Supportive care, s/p RemD and Dexa  - Now on room air  2) Fever  - Suspect from COVID   - Infectious workup negative presently  - Monitor for focal source  3) Pneumomediastinum  - Appreciate Pulm following - pneumomediastinum small and to be followed outpt.    Thang Yeager MD  Pager [phone]  After 5pm/weekends call [phone]    
60 year old female with PMhx of T2DM (on Metformin), HTN and depression presents with worsening shortness of breath and fatigue.  Admitted on 4/13 with COVID, known tested positive 4 days before admission.   Now on room air.  s/p remdesivir 4/13-17  s/p Dexamethasone 4/13-22  CTA no PE no s/s bacterial consolidation, but has presence of pneumomediastinum  UA neg  Blood Cx so far negative  Fevers resolved    Overall,  1) COVID  - Supportive care, s/p RemD and Dexa  - Now on room air  2) Fever  - Suspect from COVID   - Infectious workup negative presently  - Monitor for focal source  3) Pneumomediastinum  - Appreciate Pulm following - pneumomediastinum small and to be followed outpt.    Thang Yeager MD  Pager [phone]  After 5pm/weekends call [phone]    Please call with questions.
60 year old female with T2DM A1c 7.8 (on Metformin), HTN and depression admitted to medicine for acute respiratory failure with hypoxia and sepsis 2/2 COVID 19. 
60 year old female with T2DM A1c 7.8 (on Metformin), HTN and depression presents with worsening shortness of breath and fatigue, hypoxic to the 80s, admitted to medicine for acute respiratory failure with hypoxia and sepsis 2/2 COVID 19. 
60 year old female with T2DM A1c 7.8 (on Metformin), HTN and depression admitted to medicine for acute respiratory failure with hypoxia and sepsis 2/2 COVID 19. 
60 year old female with T2DM A1c 7.8 (on Metformin), HTN and depression presents with worsening shortness of breath and fatigue, hypoxic to the 80s, admitted to medicine for acute respiratory failure with hypoxia and sepsis 2/2 COVID 19. 
60 year old female with T2DM A1c 7.8 (on Metformin), HTN and depression admitted to medicine for acute respiratory failure with hypoxia and sepsis 2/2 COVID 19.

## 2021-05-06 NOTE — PROGRESS NOTE ADULT - PROBLEM SELECTOR PROBLEM 5
Hypertension, unspecified type
Hyperlipidemia, unspecified hyperlipidemia type
Hypertension, unspecified type
Hypertension, unspecified type
Hyperlipidemia, unspecified hyperlipidemia type
Hyperlipidemia, unspecified hyperlipidemia type
Hypertension, unspecified type
Hyperlipidemia, unspecified hyperlipidemia type
Hyperlipidemia, unspecified hyperlipidemia type
Hypertension, unspecified type
Hypertension, unspecified type
Hyperlipidemia, unspecified hyperlipidemia type
Hypertension, unspecified type
Hypertension, unspecified type

## 2021-05-06 NOTE — PROGRESS NOTE ADULT - PROBLEM SELECTOR PROBLEM 4
Type 2 diabetes mellitus without complication, unspecified whether long term insulin use
Hypertension, unspecified type
Type 2 diabetes mellitus without complication, unspecified whether long term insulin use
Hypertension, unspecified type
Type 2 diabetes mellitus without complication, unspecified whether long term insulin use
Type 2 diabetes mellitus without complication, unspecified whether long term insulin use
Hypertension, unspecified type
Hypertension, unspecified type
Type 2 diabetes mellitus without complication, unspecified whether long term insulin use
Hypertension, unspecified type
Hypertension, unspecified type
Type 2 diabetes mellitus without complication, unspecified whether long term insulin use
Hypertension, unspecified type
Type 2 diabetes mellitus without complication, unspecified whether long term insulin use
Hypertension, unspecified type

## 2021-05-06 NOTE — PROGRESS NOTE ADULT - PROBLEM SELECTOR PLAN 6
- denies SI  - c/w Paxil  -Added low dose IV Ativan for anxiety PRN
- denies SI  - c/w Paxil  -Added low dose IV Ativan for anxiety PRN
- C/w rosuvastatin
- denies SI  - c/w Paxil  -Added low dose IV Ativan for anxiety PRN
- C/w rosuvastatin
- denies SI  - c/w Paxil  -Added low dose IV Ativan for anxiety PRN
- C/w rosuvastatin
- C/w rosuvastatin
- denies SI  - c/w Paxil
- denies SI  - c/w Paxil  -Added low dose IV Ativan for anxiety PRN
- C/w rosuvastatin
- denies SI  - c/w Paxil  -Added low dose IV Ativan for anxiety PRN
- C/w rosuvastatin
- denies SI  - c/w Paxil  -Added low dose IV Ativan for anxiety PRN
- C/w rosuvastatin
- denies SI  - c/w Paxil  -Added low dose IV Ativan for anxiety PRN
- denies SI  - c/w Paxil  -Added low dose IV Ativan for anxiety PRN

## 2021-05-06 NOTE — PROGRESS NOTE ADULT - SUBJECTIVE AND OBJECTIVE BOX
Deangelo Leonard MD, MPH, PGY1  Pager [phone]  *Please page Night Float after 7 PM*    PROGRESS NOTE:     Patient is a 60y old  Female who presents with a chief complaint of shortness of breath (05 May 2021 15:26)      SUBJECTIVE / OVERNIGHT EVENTS: No acute events overnight.      MEDICATIONS  (STANDING):  ALBUTerol    90 MICROgram(s) HFA Inhaler 1 Puff(s) Inhalation every 4 hours  ALBUTerol    90 MICROgram(s) HFA Inhaler 2 Puff(s) Inhalation every 6 hours  aspirin enteric coated 81 milliGRAM(s) Oral daily  dextrose 40% Gel 15 Gram(s) Oral once  dextrose 5%. 1000 milliLiter(s) (50 mL/Hr) IV Continuous <Continuous>  dextrose 5%. 1000 milliLiter(s) (100 mL/Hr) IV Continuous <Continuous>  dextrose 50% Injectable 25 Gram(s) IV Push once  dextrose 50% Injectable 12.5 Gram(s) IV Push once  dextrose 50% Injectable 25 Gram(s) IV Push once  enoxaparin Injectable 65 milliGRAM(s) SubCutaneous every 12 hours  glucagon  Injectable 1 milliGRAM(s) IntraMuscular once  insulin glargine Injectable (LANTUS) 12 Unit(s) SubCutaneous at bedtime  insulin lispro (ADMELOG) corrective regimen sliding scale   SubCutaneous three times a day before meals  insulin lispro (ADMELOG) corrective regimen sliding scale   SubCutaneous at bedtime  insulin lispro Injectable (ADMELOG) 4 Unit(s) SubCutaneous three times a day before meals  ipratropium 17 MICROgram(s) HFA Inhaler 1 Puff(s) Inhalation every 6 hours  loratadine 10 milliGRAM(s) Oral daily  metoprolol tartrate 50 milliGRAM(s) Oral two times a day  PARoxetine 10 milliGRAM(s) Oral daily  polyethylene glycol 3350 17 Gram(s) Oral daily  senna 2 Tablet(s) Oral at bedtime    MEDICATIONS  (PRN):  acetaminophen   Tablet .. 650 milliGRAM(s) Oral every 6 hours PRN Temp greater or equal to 38C (100.4F), Mild Pain (1 - 3), Moderate Pain (4 - 6)  aluminum hydroxide/magnesium hydroxide/simethicone Suspension 30 milliLiter(s) Oral every 4 hours PRN Dyspepsia  benzonatate 100 milliGRAM(s) Oral every 8 hours PRN Cough  sodium chloride 0.65% Nasal 1 Spray(s) Both Nostrils two times a day PRN Congestion      CAPILLARY BLOOD GLUCOSE      POCT Blood Glucose.: 118 mg/dL (06 May 2021 08:19)  POCT Blood Glucose.: 186 mg/dL (05 May 2021 21:22)  POCT Blood Glucose.: 218 mg/dL (05 May 2021 17:17)  POCT Blood Glucose.: 117 mg/dL (05 May 2021 12:12)    I&O's Summary      PHYSICAL EXAM:  Vital Signs Last 24 Hrs  T(C): 36.7 (06 May 2021 05:27), Max: 36.9 (05 May 2021 13:40)  T(F): 98.1 (06 May 2021 05:27), Max: 98.5 (05 May 2021 13:40)  HR: 99 (06 May 2021 05:27) (98 - 100)  BP: 111/69 (06 May 2021 05:27) (108/62 - 131/91)  BP(mean): --  RR: 17 (06 May 2021 05:27) (17 - 22)  SpO2: 100% (06 May 2021 05:27) (86% - 100%)    CONSTITUTIONAL: NAD, well-developed  RESPIRATORY: Normal respiratory effort; lungs are clear to auscultation bilaterally  CARDIOVASCULAR: Regular rate and rhythm, normal S1 and S2, no murmur/rub/gallop; No lower extremity edema;  ABDOMEN: Nontender to palpation, normoactive bowel sounds, no rebound/guarding  MUSCLOSKELETAL: no clubbing or cyanosis of digits; no joint swelling or tenderness to palpation  PSYCH: A+O to person, place, and time; affect appropriate    LABS:                        11.4   9.22  )-----------( 535      ( 05 May 2021 07:42 )             35.7     05-05    139  |  105  |  17  ----------------------------<  145<H>  4.6   |  24  |  0.78    Ca    9.6      05 May 2021 07:42  Phos  3.2     05-05  Mg     2.3     05-05                  RADIOLOGY & ADDITIONAL TESTS:  Results Reviewed:   Imaging Personally Reviewed:  Electrocardiogram Personally Reviewed:    COORDINATION OF CARE:  Care Discussed with Consultants/Other Providers [Y/N]:  Prior or Outpatient Records Reviewed [Y/N]:   Deangelo Leonard MD, MPH, PGY1  Pager [phone]  *Please page Night Float after 7 PM*    PROGRESS NOTE:     Patient is a 60y old  Female who presents with a chief complaint of shortness of breath (05 May 2021 15:26)      SUBJECTIVE / OVERNIGHT EVENTS: No acute events overnight. Patient still feels weak with some dyspnea on exertion and feels ready for discharge. She denies chest pain, abdominal pain, n/v.      MEDICATIONS  (STANDING):  ALBUTerol    90 MICROgram(s) HFA Inhaler 1 Puff(s) Inhalation every 4 hours  ALBUTerol    90 MICROgram(s) HFA Inhaler 2 Puff(s) Inhalation every 6 hours  aspirin enteric coated 81 milliGRAM(s) Oral daily  dextrose 40% Gel 15 Gram(s) Oral once  dextrose 5%. 1000 milliLiter(s) (50 mL/Hr) IV Continuous <Continuous>  dextrose 5%. 1000 milliLiter(s) (100 mL/Hr) IV Continuous <Continuous>  dextrose 50% Injectable 25 Gram(s) IV Push once  dextrose 50% Injectable 12.5 Gram(s) IV Push once  dextrose 50% Injectable 25 Gram(s) IV Push once  enoxaparin Injectable 65 milliGRAM(s) SubCutaneous every 12 hours  glucagon  Injectable 1 milliGRAM(s) IntraMuscular once  insulin glargine Injectable (LANTUS) 12 Unit(s) SubCutaneous at bedtime  insulin lispro (ADMELOG) corrective regimen sliding scale   SubCutaneous three times a day before meals  insulin lispro (ADMELOG) corrective regimen sliding scale   SubCutaneous at bedtime  insulin lispro Injectable (ADMELOG) 4 Unit(s) SubCutaneous three times a day before meals  ipratropium 17 MICROgram(s) HFA Inhaler 1 Puff(s) Inhalation every 6 hours  loratadine 10 milliGRAM(s) Oral daily  metoprolol tartrate 50 milliGRAM(s) Oral two times a day  PARoxetine 10 milliGRAM(s) Oral daily  polyethylene glycol 3350 17 Gram(s) Oral daily  senna 2 Tablet(s) Oral at bedtime    MEDICATIONS  (PRN):  acetaminophen   Tablet .. 650 milliGRAM(s) Oral every 6 hours PRN Temp greater or equal to 38C (100.4F), Mild Pain (1 - 3), Moderate Pain (4 - 6)  aluminum hydroxide/magnesium hydroxide/simethicone Suspension 30 milliLiter(s) Oral every 4 hours PRN Dyspepsia  benzonatate 100 milliGRAM(s) Oral every 8 hours PRN Cough  sodium chloride 0.65% Nasal 1 Spray(s) Both Nostrils two times a day PRN Congestion      CAPILLARY BLOOD GLUCOSE      POCT Blood Glucose.: 118 mg/dL (06 May 2021 08:19)  POCT Blood Glucose.: 186 mg/dL (05 May 2021 21:22)  POCT Blood Glucose.: 218 mg/dL (05 May 2021 17:17)  POCT Blood Glucose.: 117 mg/dL (05 May 2021 12:12)    I&O's Summary      PHYSICAL EXAM:  Vital Signs Last 24 Hrs  T(C): 36.7 (06 May 2021 05:27), Max: 36.9 (05 May 2021 13:40)  T(F): 98.1 (06 May 2021 05:27), Max: 98.5 (05 May 2021 13:40)  HR: 99 (06 May 2021 05:27) (98 - 100)  BP: 111/69 (06 May 2021 05:27) (108/62 - 131/91)  BP(mean): --  RR: 17 (06 May 2021 05:27) (17 - 22)  SpO2: 100% (06 May 2021 05:27) (86% - 100%)    CONSTITUTIONAL: NAD, well-developed  RESPIRATORY: Normal respiratory effort; lungs are clear to auscultation bilaterally  CARDIOVASCULAR: Regular rate and rhythm, normal S1 and S2, no murmur/rub/gallop; No lower extremity edema;  ABDOMEN: Nontender to palpation, normoactive bowel sounds, no rebound/guarding  MUSCLOSKELETAL: no clubbing or cyanosis of digits; no joint swelling or tenderness to palpation  PSYCH: A+O to person, place, and time; affect appropriate    LABS:                        11.4   9.22  )-----------( 535      ( 05 May 2021 07:42 )             35.7     05-05    139  |  105  |  17  ----------------------------<  145<H>  4.6   |  24  |  0.78    Ca    9.6      05 May 2021 07:42  Phos  3.2     05-05  Mg     2.3     05-05                  RADIOLOGY & ADDITIONAL TESTS:  Results Reviewed:   Imaging Personally Reviewed:  Electrocardiogram Personally Reviewed:    COORDINATION OF CARE:  Care Discussed with Consultants/Other Providers [Y/N]:  Prior or Outpatient Records Reviewed [Y/N]:

## 2021-05-06 NOTE — PROGRESS NOTE ADULT - PROBLEM SELECTOR PLAN 7
-DVT ppx: Lovenox 40mg SQ qD  - PMD Dr. Eid aware    Updated pt  Bebo on 4/22, hopeful d/c in next few days
- denies SI  - c/w Paxil
-DVT ppx: Lovenox 40mg SQ qD  - PMD Dr. Eid aware
- denies SI  - c/w Paxil
- denies SI  - c/w Paxil
-DVT ppx: Lovenox 40mg SQ qD  - PMD Dr. Eid aware
-DVT ppx: Lovenox 40mg SQ qD  - PMD Dr. Eid aware    Updated pt  Bebo on 4/22, hopeful d/c in next few days
-DVT ppx: Lovenox 40mg SQ qD  - PMD Dr. Eid emailed  - Attempted to contact , but voicemail box full
-DVT ppx: Lovenox 40mg SQ qD  - PMD Dr. Eid aware    Updated pt  Bebo on 4/22, hopeful d/c in next few days
-DVT ppx: Lovenox 40mg SQ qD  - PMD Dr. Eid aware    Updated pt  Bebo on 4/22, hopeful d/c in next few days
- denies SI  - c/w Paxil  -Added low dose IV Ativan for anxiety PRN
-DVT ppx: Lovenox 40mg SQ qD  - PMD Dr. Eid aware
-DVT ppx: Lovenox 40mg SQ qD  - PMD Dr. Eid aware    Updated pt  Bebo on 4/22, hopeful d/c in next few days
- denies SI  - c/w Paxil
-DVT ppx: Lovenox 40mg SQ qD  - PMD Dr. Eid aware    Updated  today 4/20
- denies SI  - c/w Paxil
-DVT ppx: Lovenox 40mg SQ qD  - PMD Dr. Eid aware   - Care discussed with pt's 
-DVT ppx: Lovenox 40mg SQ qD  - PMD Dr. Eid aware    Updated  4/20
-DVT ppx: Lovenox 40mg SQ qD  - PMD Dr. Eid aware

## 2021-05-06 NOTE — PROVIDER CONTACT NOTE (OTHER) - REASON
Patient tachycardic
Pt temp I s100.5F and HR is 119 bpm
Pt temp is 100.9F
palpitations/HR
patient c/o feeling anxious, HR on [id number]
Pt HR is 125 bpm
Desat to low 80s
Patient tachycardic
Patient refuses blood work this morning because she is being discharged
refusing labs/meds
tachycardia
HR/palpitations

## 2021-05-06 NOTE — PROGRESS NOTE ADULT - PROBLEM SELECTOR PROBLEM 2
Acute respiratory failure with hypoxia
Acute respiratory failure with hypoxia
COVID-19
COVID-19
Acute respiratory failure with hypoxia
COVID-19
COVID-19
Acute respiratory failure with hypoxia
COVID-19
COVID-19
Acute respiratory failure with hypoxia
COVID-19
Acute respiratory failure with hypoxia
COVID-19
COVID-19
Acute respiratory failure with hypoxia
COVID-19
COVID-19
Acute respiratory failure with hypoxia

## 2021-05-06 NOTE — PROGRESS NOTE ADULT - PROBLEM SELECTOR PROBLEM 6
Depression, unspecified depression type
Hyperlipidemia, unspecified hyperlipidemia type
Depression, unspecified depression type
Depression, unspecified depression type
Hyperlipidemia, unspecified hyperlipidemia type
Hyperlipidemia, unspecified hyperlipidemia type
Depression, unspecified depression type
Hyperlipidemia, unspecified hyperlipidemia type
Hyperlipidemia, unspecified hyperlipidemia type
Depression, unspecified depression type
Depression, unspecified depression type
Hyperlipidemia, unspecified hyperlipidemia type
Depression, unspecified depression type
Hyperlipidemia, unspecified hyperlipidemia type
Hyperlipidemia, unspecified hyperlipidemia type
Depression, unspecified depression type
Hyperlipidemia, unspecified hyperlipidemia type
Hyperlipidemia, unspecified hyperlipidemia type

## 2021-05-06 NOTE — PROGRESS NOTE ADULT - PROBLEM SELECTOR PLAN 1
Patient with intermittent low grade fevers, most recently 5/4 afternoon to 100.4  ID following, appreciate continued recs  No evidence so far of secondary bacterial infection, patient received 3 days of Zosyn (4/27-4/29)  BCx negative to date, most recently 5/1

## 2021-05-07 ENCOUNTER — TRANSCRIPTION ENCOUNTER (OUTPATIENT)
Age: 61
End: 2021-05-07

## 2021-05-11 ENCOUNTER — TRANSCRIPTION ENCOUNTER (OUTPATIENT)
Age: 61
End: 2021-05-11

## 2021-05-12 ENCOUNTER — TRANSCRIPTION ENCOUNTER (OUTPATIENT)
Age: 61
End: 2021-05-12

## 2021-05-13 ENCOUNTER — TRANSCRIPTION ENCOUNTER (OUTPATIENT)
Age: 61
End: 2021-05-13

## 2021-05-18 PROBLEM — E11.9 TYPE 2 DIABETES MELLITUS WITHOUT COMPLICATIONS: Chronic | Status: ACTIVE | Noted: 2021-04-13

## 2021-05-18 PROBLEM — F32.9 MAJOR DEPRESSIVE DISORDER, SINGLE EPISODE, UNSPECIFIED: Chronic | Status: ACTIVE | Noted: 2021-04-13

## 2021-05-18 PROBLEM — I10 ESSENTIAL (PRIMARY) HYPERTENSION: Chronic | Status: ACTIVE | Noted: 2021-04-13

## 2021-06-07 ENCOUNTER — APPOINTMENT (OUTPATIENT)
Dept: ENDOCRINOLOGY | Facility: CLINIC | Age: 61
End: 2021-06-07
Payer: COMMERCIAL

## 2021-06-07 ENCOUNTER — APPOINTMENT (OUTPATIENT)
Dept: CARDIOLOGY | Facility: CLINIC | Age: 61
End: 2021-06-07
Payer: COMMERCIAL

## 2021-06-07 ENCOUNTER — NON-APPOINTMENT (OUTPATIENT)
Age: 61
End: 2021-06-07

## 2021-06-07 VITALS
WEIGHT: 153 LBS | HEIGHT: 60 IN | OXYGEN SATURATION: 98 % | DIASTOLIC BLOOD PRESSURE: 72 MMHG | BODY MASS INDEX: 30.04 KG/M2 | HEART RATE: 106 BPM | SYSTOLIC BLOOD PRESSURE: 120 MMHG

## 2021-06-07 DIAGNOSIS — R00.0 TACHYCARDIA, UNSPECIFIED: ICD-10-CM

## 2021-06-07 PROCEDURE — 99214 OFFICE O/P EST MOD 30 MIN: CPT

## 2021-06-07 PROCEDURE — 93000 ELECTROCARDIOGRAM COMPLETE: CPT

## 2021-06-07 RX ORDER — AZITHROMYCIN 250 MG/1
250 TABLET, FILM COATED ORAL
Qty: 1 | Refills: 0 | Status: DISCONTINUED | COMMUNITY
Start: 2020-01-02 | End: 2021-06-07

## 2021-06-07 RX ORDER — PROMETHAZINE HYDROCHLORIDE 6.25 MG/5ML
6.25 SOLUTION ORAL EVERY 4 HOURS
Qty: 150 | Refills: 0 | Status: DISCONTINUED | COMMUNITY
Start: 2020-01-02 | End: 2021-06-07

## 2021-06-07 NOTE — HISTORY OF PRESENT ILLNESS
[FreeTextEntry1] :  is a 61 year old female who  returns today in follow up with regard to a history of type 2 diabetes mellitus.  There is no known history of retinopathy, nephropathy. She too denies any history of neuropathy.   Current DM medication include Metformin ER 1000 mg bid, but patient has only been taking one per day. in light of Gi upset. HGM  tested 2 x per day of late has shown values to be running  in the low 100s, the lowest number she saw was 86.  There has been no significant hypoglycemia. She denies any chest pain, sob, neurologic or ophthalmologic complaints. She too denies any new podiatric concerns. She is up to date with her ophthalmologic visit.\par POCT A1c returned today at 6.6%  ; previously 7.4% 12/7/2020\par POCT glucose returned today at  111  mg/dL \par Additional diagnoses include of that: HTN, Hyperlipidemia. Taking Rosuvastatin 5 mg and Metoprolol 50 mg bid\par Is on jesus red, vit d  5,000 iu and quinol with  Tumeric, zinc 50 mg and Probiotic. Patient follows with Dr. Eid. \par Had covid in April 2021 and was hospitalized for 22 days at [facility].

## 2021-06-07 NOTE — HISTORY OF PRESENT ILLNESS
[FreeTextEntry1] : The patient presents for evaluation of high blood pressure. Patient is currently tolerating the current antihypertensive regime and they deny headaches, stiff neck, visual changes, pedal Edema or PND. They also are here for follow-up of elevated cholesterol and continued care of diabetes mellitus. Patient is currently tolerating medication and denies muscle pain, joint pain, back pain, tea, nausea, vomiting, abdominal pain or diarrhea. The patient is trying to follow a low cholesterol diet.  The patient is following the diabetic regimen and is tolerating hypoglycemic agents and following the diet.\par Pt had covid in April and was hospitalized in [facility] discharged on 2L O2 and xarelto for 30 days. CT Angio negative for PE, completed remdesivir and dexamethasone\par

## 2021-06-08 ENCOUNTER — APPOINTMENT (OUTPATIENT)
Dept: PULMONOLOGY | Facility: CLINIC | Age: 61
End: 2021-06-08

## 2021-06-08 DIAGNOSIS — Z20.822 ENCOUNTER FOR PREPROCEDURAL LABORATORY EXAMINATION: ICD-10-CM

## 2021-06-08 DIAGNOSIS — Z01.812 ENCOUNTER FOR PREPROCEDURAL LABORATORY EXAMINATION: ICD-10-CM

## 2021-06-11 LAB
ALBUMIN SERPL ELPH-MCNC: 4.7 G/DL
ALP BLD-CCNC: 87 U/L
ALT SERPL-CCNC: 17 U/L
ANION GAP SERPL CALC-SCNC: 13 MMOL/L
AST SERPL-CCNC: 16 U/L
BILIRUB DIRECT SERPL-MCNC: 0.1 MG/DL
BILIRUB INDIRECT SERPL-MCNC: 0.2 MG/DL
BILIRUB SERPL-MCNC: 0.3 MG/DL
BUN SERPL-MCNC: 14 MG/DL
CALCIUM SERPL-MCNC: 10.2 MG/DL
CHLORIDE SERPL-SCNC: 102 MMOL/L
CHOLEST SERPL-MCNC: 175 MG/DL
CK SERPL-CCNC: 66 U/L
CO2 SERPL-SCNC: 24 MMOL/L
COVID-19 NUCLEOCAPSID  GAM ANTIBODY INTERPRETATION: POSITIVE
COVID-19 SPIKE DOMAIN ANTIBODY INTERPRETATION: POSITIVE
CREAT SERPL-MCNC: 0.81 MG/DL
CRP SERPL-MCNC: <3 MG/L
DEPRECATED D DIMER PPP IA-ACNC: <150 NG/ML DDU
ERYTHROCYTE [SEDIMENTATION RATE] IN BLOOD BY WESTERGREN METHOD: 24 MM/HR
ESTIMATED AVERAGE GLUCOSE: 157 MG/DL
FERRITIN SERPL-MCNC: 97 NG/ML
GLUCOSE SERPL-MCNC: 72 MG/DL
HBA1C MFR BLD HPLC: 7.1 %
HDLC SERPL-MCNC: 44 MG/DL
LDLC SERPL CALC-MCNC: 104 MG/DL
LDLC SERPL DIRECT ASSAY-MCNC: 103 MG/DL
NONHDLC SERPL-MCNC: 131 MG/DL
NT-PROBNP SERPL-MCNC: 72 PG/ML
POTASSIUM SERPL-SCNC: 4.1 MMOL/L
PROT SERPL-MCNC: 7.8 G/DL
SARS-COV-2 AB SERPL IA-ACNC: >250 U/ML
SARS-COV-2 AB SERPL QL IA: 153 INDEX
SODIUM SERPL-SCNC: 139 MMOL/L
TRIGL SERPL-MCNC: 135 MG/DL

## 2021-06-14 LAB — SARS-COV-2 N GENE NPH QL NAA+PROBE: NOT DETECTED

## 2021-06-16 ENCOUNTER — APPOINTMENT (OUTPATIENT)
Dept: PULMONOLOGY | Facility: CLINIC | Age: 61
End: 2021-06-16
Payer: COMMERCIAL

## 2021-06-16 VITALS
RESPIRATION RATE: 18 BRPM | DIASTOLIC BLOOD PRESSURE: 80 MMHG | OXYGEN SATURATION: 96 % | TEMPERATURE: 98 F | HEART RATE: 98 BPM | HEIGHT: 63 IN | BODY MASS INDEX: 25.16 KG/M2 | WEIGHT: 142 LBS | SYSTOLIC BLOOD PRESSURE: 121 MMHG

## 2021-06-16 LAB — POCT - HEMOGLOBIN (HGB), QUANTITATIVE, TRANSCUTANEOUS: 11.7

## 2021-06-16 PROCEDURE — 94060 EVALUATION OF WHEEZING: CPT

## 2021-06-16 PROCEDURE — 88738 HGB QUANT TRANSCUTANEOUS: CPT

## 2021-06-16 PROCEDURE — 99204 OFFICE O/P NEW MOD 45 MIN: CPT | Mod: 25

## 2021-06-16 PROCEDURE — 94726 PLETHYSMOGRAPHY LUNG VOLUMES: CPT

## 2021-06-16 PROCEDURE — 71046 X-RAY EXAM CHEST 2 VIEWS: CPT

## 2021-06-16 PROCEDURE — 99214 OFFICE O/P EST MOD 30 MIN: CPT | Mod: 25

## 2021-06-16 PROCEDURE — 94618 PULMONARY STRESS TESTING: CPT

## 2021-06-16 PROCEDURE — 94729 DIFFUSING CAPACITY: CPT

## 2021-06-16 PROCEDURE — ZZZZZ: CPT

## 2021-06-16 RX ORDER — METFORMIN ER 750 MG 750 MG/1
750 TABLET ORAL
Qty: 180 | Refills: 1 | Status: DISCONTINUED | COMMUNITY
Start: 2019-04-23 | End: 2021-06-16

## 2021-06-16 RX ORDER — LOSARTAN POTASSIUM 100 MG/1
100 TABLET, FILM COATED ORAL
Qty: 90 | Refills: 1 | Status: DISCONTINUED | COMMUNITY
Start: 2019-04-23 | End: 2021-06-16

## 2021-06-16 NOTE — REASON FOR VISIT
[Follow-Up - From Hospitalization] : a follow-up visit after a recent hospitalization [TextBox_44] : Post COVID

## 2021-06-16 NOTE — PROCEDURE
[FreeTextEntry1] : PFT: Slight restriction.  Lung volumes normal. DLCO mildly reduced when corrected for volumes.\par \par CXR: still with some faint bilateral patchy opacities reflective of prior covid infection but greatly improved from hospitalization.  No effusions, cardiac silhouette appears normal, no atif abnormality.\par \par exercise oximetry: no significant O2 desaturation with 6 min of walking on room air\par \par \par Reviewed:\par EXAM: CT ANGIO CHEST (W)AW IC\par \par \par PROCEDURE DATE: Apr 27 2021\par \par \par \par INTERPRETATION: CLINICAL INFORMATION: Fever, tachycardia, COVID positive\par \par COMPARISON: CTA chest 4/13/2021.\par \par CONTRAST/COMPLICATIONS:\par IV Contrast: Omnipaque 350 90 cc administered 10 cc discarded\par Oral Contrast: NONE\par Complications: None reported at time of study completion\par \par PROCEDURE:\par CT Angiography of the Chest.\par Sagittal and coronal reformats were performed as well as 3D (MIP) reconstructions.\par \par FINDINGS:\par \par PULMONARY ARTERY: No pulmonary embolus in the main, left and right, lobar and segmental pulmonary arteries. Limited evaluation of the subsegmental pulmonary arteries secondary to suboptimal contrast bolus timing and motion artifact.\par LUNGS AND AIRWAYS: Patent central airways. Redemonstrated bilateral patchy groundglass opacities and consolidations in all the lobes of the bilateral lungs with interval increase in involvement of the bilateral upper lobes.\par PLEURA: No pleural effusion.\par MEDIASTINUM AND MIAN: No lymphadenopathy. New small foci of air in the anterior mediastinum.\par VESSELS: Bovine aortic arch.\par HEART: Heart size is normal. No pericardial effusion.\par CHEST WALL AND LOWER NECK: Within normal limits.\par VISUALIZED UPPER ABDOMEN: Small hiatal hernia.\par BONES: Degenerative changes.\par \par IMPRESSION:\par No pulmonary embolism.\par \par Redemonstrated bilateral groundglass opacities and consolidations with interval increase in involvement of the bilateral upper lobes. Findings are consistent with patient's history of atypical/COVID infection.\par \par Pneumomediastinum.

## 2021-06-16 NOTE — CONSULT LETTER
[FreeTextEntry1] : Dear ,\par \par I had the pleasure of evaluating your patient, BISI AGUILAR today in pulmonary consultation.  Please refer to my attached note for my findings and recommendations.\par \par \par Thank you for allowing me to participate in the care of your patient, please feel free to call with any questions or concerns.\par \par \par Sincerely,\par \par Marge Alex MD\par [facility] \par [facility]\par \par

## 2021-06-16 NOTE — HISTORY OF PRESENT ILLNESS
[Never] : never [TextBox_4] : Hospitalized at [facility] for COVID infection from april 13, 2021-May 6, 2021\par received: remdesivir/dexa, dc on xarelto 10 x 30 days\par cta neg for pe but had some pneumomediastinum\par was sent home with O2, no longer using it\par \par \par Currently feeling about 95% better, no dyspnea, chest pain, rare cough.\par no prior hx of pulm disease\par never hospitalized for resp illness prior to covid infection\par checking O2 sats which are always >95%

## 2021-06-30 ENCOUNTER — TRANSCRIPTION ENCOUNTER (OUTPATIENT)
Age: 61
End: 2021-06-30

## 2021-08-01 LAB
25(OH)D3 SERPL-MCNC: 50 NG/ML
HBA1C MFR BLD HPLC: 6.6
T3FREE SERPL-MCNC: 2.95 PG/ML
T4 FREE SERPL-MCNC: 1 NG/DL
TSH SERPL-ACNC: 1.79 UIU/ML

## 2021-09-21 ENCOUNTER — APPOINTMENT (OUTPATIENT)
Dept: PULMONOLOGY | Facility: CLINIC | Age: 61
End: 2021-09-21
Payer: COMMERCIAL

## 2021-09-21 ENCOUNTER — OUTPATIENT (OUTPATIENT)
Dept: OUTPATIENT SERVICES | Facility: HOSPITAL | Age: 61
LOS: 1 days | End: 2021-09-21
Payer: COMMERCIAL

## 2021-09-21 ENCOUNTER — APPOINTMENT (OUTPATIENT)
Dept: CT IMAGING | Facility: IMAGING CENTER | Age: 61
End: 2021-09-21
Payer: COMMERCIAL

## 2021-09-21 VITALS — SYSTOLIC BLOOD PRESSURE: 137 MMHG | DIASTOLIC BLOOD PRESSURE: 88 MMHG

## 2021-09-21 VITALS — TEMPERATURE: 97.5 F | OXYGEN SATURATION: 95 % | HEART RATE: 91 BPM

## 2021-09-21 DIAGNOSIS — U07.1 COVID-19: ICD-10-CM

## 2021-09-21 PROCEDURE — 71250 CT THORAX DX C-: CPT

## 2021-09-21 PROCEDURE — 99214 OFFICE O/P EST MOD 30 MIN: CPT

## 2021-09-21 PROCEDURE — 71250 CT THORAX DX C-: CPT | Mod: 26

## 2021-09-21 NOTE — ASSESSMENT
[FreeTextEntry1] : doing well \par has repeat ct later today\par may consider nasal spray for pnd will wait to see results of ct chest

## 2021-09-21 NOTE — REASON FOR VISIT
[Follow-Up] : a follow-up visit [Abnormal CXR/ Chest CT] : an abnormal CXR/ chest CT [TextBox_44] : post covid

## 2021-09-27 ENCOUNTER — NON-APPOINTMENT (OUTPATIENT)
Age: 61
End: 2021-09-27

## 2021-10-11 ENCOUNTER — APPOINTMENT (OUTPATIENT)
Dept: ENDOCRINOLOGY | Facility: CLINIC | Age: 61
End: 2021-10-11
Payer: COMMERCIAL

## 2021-10-11 ENCOUNTER — APPOINTMENT (OUTPATIENT)
Dept: CARDIOLOGY | Facility: CLINIC | Age: 61
End: 2021-10-11
Payer: COMMERCIAL

## 2021-10-11 VITALS
DIASTOLIC BLOOD PRESSURE: 78 MMHG | SYSTOLIC BLOOD PRESSURE: 128 MMHG | WEIGHT: 142 LBS | HEIGHT: 63 IN | BODY MASS INDEX: 25.16 KG/M2 | OXYGEN SATURATION: 99 % | TEMPERATURE: 98.8 F | HEART RATE: 85 BPM

## 2021-10-11 VITALS
TEMPERATURE: 98.8 F | HEIGHT: 63 IN | SYSTOLIC BLOOD PRESSURE: 128 MMHG | BODY MASS INDEX: 25.16 KG/M2 | WEIGHT: 142 LBS | DIASTOLIC BLOOD PRESSURE: 78 MMHG | HEART RATE: 85 BPM | OXYGEN SATURATION: 99 %

## 2021-10-11 DIAGNOSIS — U07.1 COVID-19: ICD-10-CM

## 2021-10-11 DIAGNOSIS — E11.37X1 TYPE 2 DIABETES MELLITUS WITH DIABETIC MACULAR EDEMA, RESOLVED FOLLOWING TREATMENT, RIGHT EYE: ICD-10-CM

## 2021-10-11 DIAGNOSIS — Z79.4 TYPE 2 DIABETES MELLITUS WITH DIABETIC MACULAR EDEMA, RESOLVED FOLLOWING TREATMENT, RIGHT EYE: ICD-10-CM

## 2021-10-11 LAB
GLUCOSE BLDC GLUCOMTR-MCNC: 104
HBA1C MFR BLD HPLC: 6.7

## 2021-10-11 PROCEDURE — 93000 ELECTROCARDIOGRAM COMPLETE: CPT

## 2021-10-11 PROCEDURE — 99214 OFFICE O/P EST MOD 30 MIN: CPT

## 2021-10-11 PROCEDURE — 82962 GLUCOSE BLOOD TEST: CPT

## 2021-10-11 PROCEDURE — 83036 HEMOGLOBIN GLYCOSYLATED A1C: CPT | Mod: QW

## 2021-10-12 LAB
ALBUMIN SERPL ELPH-MCNC: 4.7 G/DL
ALP BLD-CCNC: 91 U/L
ALT SERPL-CCNC: 15 U/L
ANION GAP SERPL CALC-SCNC: 11 MMOL/L
AST SERPL-CCNC: 15 U/L
BASOPHILS # BLD AUTO: 0.04 K/UL
BASOPHILS NFR BLD AUTO: 0.5 %
BILIRUB DIRECT SERPL-MCNC: 0.1 MG/DL
BILIRUB INDIRECT SERPL-MCNC: 0.2 MG/DL
BILIRUB SERPL-MCNC: 0.3 MG/DL
BUN SERPL-MCNC: 16 MG/DL
CALCIUM SERPL-MCNC: 9.7 MG/DL
CHLORIDE SERPL-SCNC: 103 MMOL/L
CHOLEST SERPL-MCNC: 157 MG/DL
CK SERPL-CCNC: 92 U/L
CO2 SERPL-SCNC: 26 MMOL/L
COVID-19 NUCLEOCAPSID  GAM ANTIBODY INTERPRETATION: POSITIVE
COVID-19 SPIKE DOMAIN ANTIBODY INTERPRETATION: POSITIVE
CREAT SERPL-MCNC: 0.84 MG/DL
EOSINOPHIL # BLD AUTO: 0.09 K/UL
EOSINOPHIL NFR BLD AUTO: 1.1 %
ESTIMATED AVERAGE GLUCOSE: 151 MG/DL
GLUCOSE SERPL-MCNC: 106 MG/DL
HBA1C MFR BLD HPLC: 6.9 %
HCT VFR BLD CALC: 39.6 %
HDLC SERPL-MCNC: 50 MG/DL
HGB BLD-MCNC: 12.4 G/DL
IMM GRANULOCYTES NFR BLD AUTO: 0.4 %
LDLC SERPL CALC-MCNC: 91 MG/DL
LDLC SERPL DIRECT ASSAY-MCNC: 87 MG/DL
LYMPHOCYTES # BLD AUTO: 1.42 K/UL
LYMPHOCYTES NFR BLD AUTO: 18 %
MAN DIFF?: NORMAL
MCHC RBC-ENTMCNC: 28 PG
MCHC RBC-ENTMCNC: 31.3 GM/DL
MCV RBC AUTO: 89.4 FL
MONOCYTES # BLD AUTO: 0.67 K/UL
MONOCYTES NFR BLD AUTO: 8.5 %
NEUTROPHILS # BLD AUTO: 5.62 K/UL
NEUTROPHILS NFR BLD AUTO: 71.5 %
NONHDLC SERPL-MCNC: 107 MG/DL
PLATELET # BLD AUTO: 366 K/UL
POTASSIUM SERPL-SCNC: 4.4 MMOL/L
PROT SERPL-MCNC: 7.6 G/DL
RBC # BLD: 4.43 M/UL
RBC # FLD: 15.4 %
SARS-COV-2 AB SERPL IA-ACNC: >250 U/ML
SARS-COV-2 AB SERPL QL IA: 176 INDEX
SODIUM SERPL-SCNC: 141 MMOL/L
TRIGL SERPL-MCNC: 79 MG/DL
WBC # FLD AUTO: 7.87 K/UL

## 2021-10-12 NOTE — HISTORY OF PRESENT ILLNESS
[FreeTextEntry1] : The patient presents for evaluation of high blood pressure. Patient is currently tolerating the current  antihypertensive regime and they deny headaches, stiff neck, visual changes, pedal Edema or PND. They also are here for follow-up of elevated cholesterol. Patient is currently tolerating medication and and does not complain of new  muscle pain, joint pain, back pain,urinary changes ,nausea, vomiting, abdominal pain or diarrhea. The patient is trying to follow a low cholesterol diet. \par The patient also presents for continued care of diabetes mellitus. Patient is following the diabetic regimen. Patient is tolerating hypoglycemic agents and following the diet. Patient denies any visual changes, blood in the urine, abdominal pain, nausea, vomiting, myalgias, arthralgias, paresthesia’s and syncope. The patient denies any chest discomfort, shortness of breath or new neurologic signs or symptoms. Patient denies any polyuria, worsening nocturia, or polydipsia. \par Pt states today that they had both covid 19 vaccine . pt had the COVID-19 vaccine without major side effects. The patient did experience mild fatigue headache and arm soreness. The patient denied any fever over 100.5. pt denies any recent sore throat, fever, chills loss of taste or smell. Pt had first dose of Pfizer vaccine two weeks ago, due for second shot 10/22.

## 2021-10-16 NOTE — HISTORY OF PRESENT ILLNESS
[FreeTextEntry1] :  is a 60 year old female who  returns today in follow up with regard to a history of type 2 diabetes mellitus.  There is no known history of retinopathy, nephropathy. She too denies any history of neuropathy.   Current DM medication include Metformin  100 mg bid with bg's am low 1000's. iHGM  tested 2 x per day of late has shown values to be running low 100's rarely am down to 70's -asympt.                      There has been no significant hypoglycemia. She denies any chest pain, sob, neurologic or ophthalmologic complaints. She too denies any new podiatric concerns. She is up to date with her ophthalmologic visit.\par Additional diagnoses include of that: HTN, Hyperlipidemia. Taking Rosuvastatin 5 mg and Losartan 100 mg daily.\par Is on jesus red, vit d  5,000 iu and quinol with  Tumeric  and Probiotic. Patient follows with Dr. Eid. \par UTD with optho\par Too takes jesus red and biotin\par \par \par \par

## 2021-10-16 NOTE — PHYSICAL EXAM
[Alert] : alert [Well Nourished] : well nourished [No Acute Distress] : no acute distress [Well Developed] : well developed [Normal Sclera/Conjunctiva] : normal sclera/conjunctiva [EOMI] : extra ocular movement intact [No Proptosis] : no proptosis [Normal Oropharynx] : the oropharynx was normal [Thyroid Not Enlarged] : the thyroid was not enlarged [No Thyroid Nodules] : no palpable thyroid nodules [No Respiratory Distress] : no respiratory distress [No Accessory Muscle Use] : no accessory muscle use [Clear to Auscultation] : lungs were clear to auscultation bilaterally [Normal S1, S2] : normal S1 and S2 [Regular Rhythm] : with a regular rhythm [Normal Rate] : heart rate was normal [No Edema] : no peripheral edema [Pedal Pulses Normal] : the pedal pulses are present [Not Tender] : non-tender [Normal Bowel Sounds] : normal bowel sounds [Not Distended] : not distended [Soft] : abdomen soft [Normal Anterior Cervical Nodes] : no anterior cervical lymphadenopathy [Normal Posterior Cervical Nodes] : no posterior cervical lymphadenopathy [No Spinal Tenderness] : no spinal tenderness [Spine Straight] : spine straight [No Stigmata of Cushings Syndrome] : no stigmata of Cushings Syndrome [Normal Gait] : normal gait [Normal Strength/Tone] : muscle strength and tone were normal [No Rash] : no rash [Normal Reflexes] : deep tendon reflexes were 2+ and symmetric [No Tremors] : no tremors [Oriented x3] : oriented to person, place, and time [Acanthosis Nigricans] : no acanthosis nigricans

## 2021-10-31 LAB
CREAT SPEC-SCNC: 92 MG/DL
MICROALBUMIN 24H UR DL<=1MG/L-MCNC: 1.5 MG/DL
MICROALBUMIN/CREAT 24H UR-RTO: 16 MG/G
T3FREE SERPL-MCNC: 2.61 PG/ML
T4 FREE SERPL-MCNC: 1.1 NG/DL
TSH SERPL-ACNC: 1.78 UIU/ML

## 2021-11-01 ENCOUNTER — NON-APPOINTMENT (OUTPATIENT)
Age: 61
End: 2021-11-01

## 2022-03-07 ENCOUNTER — APPOINTMENT (OUTPATIENT)
Dept: ENDOCRINOLOGY | Facility: CLINIC | Age: 62
End: 2022-03-07
Payer: COMMERCIAL

## 2022-03-07 VITALS
HEART RATE: 88 BPM | BODY MASS INDEX: 26.58 KG/M2 | SYSTOLIC BLOOD PRESSURE: 125 MMHG | TEMPERATURE: 98.4 F | HEIGHT: 63 IN | OXYGEN SATURATION: 98 % | DIASTOLIC BLOOD PRESSURE: 88 MMHG | WEIGHT: 150 LBS

## 2022-03-07 VITALS — SYSTOLIC BLOOD PRESSURE: 128 MMHG | DIASTOLIC BLOOD PRESSURE: 86 MMHG

## 2022-03-07 LAB
GLUCOSE BLDC GLUCOMTR-MCNC: 137
HBA1C MFR BLD HPLC: 7.1

## 2022-03-07 PROCEDURE — 83036 HEMOGLOBIN GLYCOSYLATED A1C: CPT | Mod: QW

## 2022-03-07 PROCEDURE — 82962 GLUCOSE BLOOD TEST: CPT

## 2022-03-07 PROCEDURE — 99214 OFFICE O/P EST MOD 30 MIN: CPT

## 2022-03-07 NOTE — HISTORY OF PRESENT ILLNESS
[FreeTextEntry1] :  is a 61year old female who  returns today in follow up with regard to a history of type 2 diabetes mellitus.  There is no known history of retinopathy, nephropathy. She too denies any history of neuropathy.   Current DM medication include Metformin  100 mg bid with bg's am low 1000's. HGM  tested 2 x per day of late has shown values to be running   low 100s with occasional values under 100. Highest values in the 130s in the am. There has been no significant hypoglycemia. She denies any chest pain, sob, neurologic or ophthalmologic complaints. She too denies any new podiatric concerns. She is up to date with her ophthalmologic visit.\par POCT A1c returned today at 7.1%  ; previously 6.9% from 10/11/2021\par POCT glucose returned today at  137  mg/dL \par \par Additional diagnoses include of that: HTN, Hyperlipidemia. Taking Rosuvastatin 5 mg and Metoprolol 50 mg bid. Off losartan. \par Is on jesus red, vit d  5,000 iu and quinol with  Tumeric  and Probiotic. Patient follows with Dr. Eid. \par UTD with optho\par Too takes jesus red and biotin\par Notes intermittent dizziness. States episodes to not last a long time. Has been experiencing for some time. States BG is not low at the time.

## 2022-03-08 LAB
25(OH)D3 SERPL-MCNC: 41.8 NG/ML
ALBUMIN SERPL ELPH-MCNC: 4.9 G/DL
ALP BLD-CCNC: 88 U/L
ALT SERPL-CCNC: 16 U/L
ANION GAP SERPL CALC-SCNC: 15 MMOL/L
AST SERPL-CCNC: 13 U/L
BILIRUB SERPL-MCNC: 0.2 MG/DL
BUN SERPL-MCNC: 21 MG/DL
CALCIUM SERPL-MCNC: 10.4 MG/DL
CHLORIDE SERPL-SCNC: 107 MMOL/L
CHOLEST SERPL-MCNC: 200 MG/DL
CO2 SERPL-SCNC: 22 MMOL/L
CREAT SERPL-MCNC: 0.95 MG/DL
CREAT SPEC-SCNC: 80 MG/DL
EGFR: 69 ML/MIN/1.73M2
ESTIMATED AVERAGE GLUCOSE: 157 MG/DL
FRUCTOSAMINE SERPL-MCNC: 286 UMOL/L
GLUCOSE SERPL-MCNC: 122 MG/DL
GLYCOMARK.: 14.1 UG/ML
HBA1C MFR BLD HPLC: 7.1 %
HDLC SERPL-MCNC: 57 MG/DL
LDLC SERPL DIRECT ASSAY-MCNC: 127 MG/DL
MICROALBUMIN 24H UR DL<=1MG/L-MCNC: 2.1 MG/DL
MICROALBUMIN/CREAT 24H UR-RTO: 26 MG/G
POTASSIUM SERPL-SCNC: 4.6 MMOL/L
PROT SERPL-MCNC: 7.7 G/DL
SODIUM SERPL-SCNC: 144 MMOL/L
T3FREE SERPL-MCNC: 2.51 PG/ML
T4 FREE SERPL-MCNC: 1 NG/DL
TRIGL SERPL-MCNC: 92 MG/DL
TSH SERPL-ACNC: 1.97 UIU/ML
VIT B12 SERPL-MCNC: 272 PG/ML

## 2022-06-20 ENCOUNTER — APPOINTMENT (OUTPATIENT)
Dept: CARDIOLOGY | Facility: CLINIC | Age: 62
End: 2022-06-20
Payer: COMMERCIAL

## 2022-06-20 ENCOUNTER — NON-APPOINTMENT (OUTPATIENT)
Age: 62
End: 2022-06-20

## 2022-06-20 VITALS
SYSTOLIC BLOOD PRESSURE: 154 MMHG | HEART RATE: 78 BPM | DIASTOLIC BLOOD PRESSURE: 84 MMHG | OXYGEN SATURATION: 98 % | TEMPERATURE: 97.6 F

## 2022-06-20 DIAGNOSIS — Z00.00 ENCOUNTER FOR GENERAL ADULT MEDICAL EXAMINATION W/OUT ABNORMAL FINDINGS: ICD-10-CM

## 2022-06-20 DIAGNOSIS — Z23 ENCOUNTER FOR IMMUNIZATION: ICD-10-CM

## 2022-06-20 DIAGNOSIS — M54.32 SCIATICA, LEFT SIDE: ICD-10-CM

## 2022-06-20 PROCEDURE — 93000 ELECTROCARDIOGRAM COMPLETE: CPT

## 2022-06-20 PROCEDURE — 99214 OFFICE O/P EST MOD 30 MIN: CPT

## 2022-06-20 RX ORDER — TIZANIDINE 4 MG/1
4 TABLET ORAL
Qty: 7 | Refills: 0 | Status: ACTIVE | COMMUNITY
Start: 2020-01-29 | End: 1900-01-01

## 2022-06-20 NOTE — HISTORY OF PRESENT ILLNESS
[FreeTextEntry1] : The patient presents for evaluation of high blood pressure. Patient is currently tolerating the current antihypertensive regime and they deny headaches, stiff neck, visual changes, pedal Edema or PND. They also are here for follow-up of elevated cholesterol. Patient is currently tolerating medication and and does not complain of new muscle pain, joint pain, back pain,urinary changes ,nausea, vomiting, abdominal pain or diarrhea. The patient is trying to follow a low cholesterol diet. \par The patient also presents for continued care of diabetes mellitus. Patient is following the diabetic regimen. Patient is tolerating hypoglycemic agents and following the diet. Patient denies any visual changes, blood in the urine, abdominal pain, nausea, vomiting, myalgias, arthralgias, paresthesia’s and syncope. The patient denies any chest discomfort, shortness of breath or new neurologic signs or symptoms. Patient denies any polyuria, worsening nocturia, or polydipsia. \par Pt has sciatica in her left buttock and leg. Takes ibuprofen for relief . \par Patient complains of episode of dizziness consisting of a sensation of movement with positional change followed by feeling of movement they deny spinning sensation while standing still. Patient states that they do have symptoms that come and go depending on what movement they make. They do not feel as if they are going to pass out and cannot completely explain the sensation. The symptoms are not always constant and comes a goes without particular pattern.  They deny weakness, of limbs, facial drooping, difficulty speaking or blurred/loss of vision.\par

## 2022-06-23 LAB
ALBUMIN SERPL ELPH-MCNC: 4.5 G/DL
ALP BLD-CCNC: 93 U/L
ALT SERPL-CCNC: 18 U/L
ANION GAP SERPL CALC-SCNC: 12 MMOL/L
APO B SERPL-MCNC: 102 MG/DL
AST SERPL-CCNC: 17 U/L
BILIRUB DIRECT SERPL-MCNC: 0.1 MG/DL
BILIRUB INDIRECT SERPL-MCNC: 0.3 MG/DL
BILIRUB SERPL-MCNC: 0.4 MG/DL
BUN SERPL-MCNC: 17 MG/DL
CALCIUM SERPL-MCNC: 9.8 MG/DL
CHLORIDE SERPL-SCNC: 104 MMOL/L
CHOLEST SERPL-MCNC: 185 MG/DL
CK SERPL-CCNC: 132 U/L
CO2 SERPL-SCNC: 25 MMOL/L
COVID-19 NUCLEOCAPSID  GAM ANTIBODY INTERPRETATION: POSITIVE
COVID-19 SPIKE DOMAIN ANTIBODY INTERPRETATION: POSITIVE
CREAT SERPL-MCNC: 0.89 MG/DL
EGFR: 74 ML/MIN/1.73M2
ESTIMATED AVERAGE GLUCOSE: 157 MG/DL
GLUCOSE SERPL-MCNC: 100 MG/DL
HBA1C MFR BLD HPLC: 7.1 %
HDLC SERPL-MCNC: 50 MG/DL
LDLC SERPL CALC-MCNC: 121 MG/DL
LDLC SERPL DIRECT ASSAY-MCNC: 113 MG/DL
NONHDLC SERPL-MCNC: 136 MG/DL
POTASSIUM SERPL-SCNC: 4.1 MMOL/L
PROT SERPL-MCNC: 8 G/DL
SARS-COV-2 AB SERPL IA-ACNC: >250 U/ML
SARS-COV-2 AB SERPL QL IA: 215 INDEX
SODIUM SERPL-SCNC: 142 MMOL/L
TRIGL SERPL-MCNC: 75 MG/DL

## 2022-06-23 RX ORDER — DICLOFENAC SODIUM 100 MG/1
100 TABLET, FILM COATED, EXTENDED RELEASE ORAL
Qty: 7 | Refills: 0 | Status: DISCONTINUED | COMMUNITY
Start: 2020-01-29 | End: 2022-06-23

## 2022-06-23 RX ORDER — MELOXICAM 7.5 MG/1
7.5 TABLET ORAL
Qty: 7 | Refills: 0 | Status: ACTIVE | COMMUNITY
Start: 2022-06-23 | End: 1900-01-01

## 2022-06-29 ENCOUNTER — APPOINTMENT (OUTPATIENT)
Dept: RADIOLOGY | Facility: IMAGING CENTER | Age: 62
End: 2022-06-29

## 2022-06-29 ENCOUNTER — OUTPATIENT (OUTPATIENT)
Dept: OUTPATIENT SERVICES | Facility: HOSPITAL | Age: 62
LOS: 1 days | End: 2022-06-29
Payer: COMMERCIAL

## 2022-06-29 DIAGNOSIS — Z00.8 ENCOUNTER FOR OTHER GENERAL EXAMINATION: ICD-10-CM

## 2022-06-29 PROCEDURE — 77080 DXA BONE DENSITY AXIAL: CPT | Mod: 26

## 2022-06-29 PROCEDURE — 77080 DXA BONE DENSITY AXIAL: CPT

## 2022-07-12 ENCOUNTER — NON-APPOINTMENT (OUTPATIENT)
Age: 62
End: 2022-07-12

## 2022-08-01 ENCOUNTER — APPOINTMENT (OUTPATIENT)
Dept: ENDOCRINOLOGY | Facility: CLINIC | Age: 62
End: 2022-08-01

## 2022-08-01 ENCOUNTER — APPOINTMENT (OUTPATIENT)
Dept: CARDIOLOGY | Facility: CLINIC | Age: 62
End: 2022-08-01

## 2022-08-04 ENCOUNTER — APPOINTMENT (OUTPATIENT)
Dept: CARDIOLOGY | Facility: CLINIC | Age: 62
End: 2022-08-04

## 2022-08-25 ENCOUNTER — APPOINTMENT (OUTPATIENT)
Dept: ENDOCRINOLOGY | Facility: CLINIC | Age: 62
End: 2022-08-25

## 2022-08-25 ENCOUNTER — APPOINTMENT (OUTPATIENT)
Dept: CARDIOLOGY | Facility: CLINIC | Age: 62
End: 2022-08-25

## 2022-08-25 VITALS
HEIGHT: 63 IN | BODY MASS INDEX: 26.58 KG/M2 | HEART RATE: 81 BPM | SYSTOLIC BLOOD PRESSURE: 142 MMHG | DIASTOLIC BLOOD PRESSURE: 82 MMHG | OXYGEN SATURATION: 97 % | WEIGHT: 150 LBS

## 2022-08-25 LAB
GLUCOSE BLDC GLUCOMTR-MCNC: 127
HBA1C MFR BLD HPLC: 7.9

## 2022-08-25 PROCEDURE — 83036 HEMOGLOBIN GLYCOSYLATED A1C: CPT | Mod: QW

## 2022-08-25 PROCEDURE — 93306 TTE W/DOPPLER COMPLETE: CPT

## 2022-08-25 PROCEDURE — 82962 GLUCOSE BLOOD TEST: CPT

## 2022-08-25 PROCEDURE — 93880 EXTRACRANIAL BILAT STUDY: CPT

## 2022-08-25 PROCEDURE — 99214 OFFICE O/P EST MOD 30 MIN: CPT

## 2022-08-25 NOTE — HISTORY OF PRESENT ILLNESS
[FreeTextEntry1] :  is a 61year old female who returns today in follow up with regard to a history of type 2 diabetes mellitus. Dibetes has been present for about 6/7 years. There is no known history of retinopathy, nephropathy. She too denies any history of neuropathy.\par \par  Current DM medication includes Metformin 1000 mg bid.\par \par HGM tested 2x per day of late has shown values to be running in the low 100s with one low value in 80s recently.  There has been no significant hypoglycemia. \par \par She denies any chest pain, sob, neurologic or ophthalmologic complaints. She too denies any new podiatric concerns. She is up to date with her ophthalmologic visit. She does have a cataract in the right eye. \par \par POCT A1c returned today at  7.9% ; previously 7.1 % on 3/8/22\par POCT glucose returned today at 127 mg/dL \par \par Additional diagnoses include of that: HTN, Hyperlipidemia. Taking Rosuvastatin 5 mg and Metoprolol 50 mg bid. Off losartan. \par Is on jesus red, vit d 5,000 iu and quinol with Tumeric and Probiotic. Patient follows with Dr. Eid. \par Too takes jesus red and biotin\par \par DEXA bone density scan done in June 2022 revealed osteoporosis in the spine with the values: \par Spine: -2.7\par Femoral neck: -2.2\par Total Hip: -1.1\par \par She is currently taking vitamin d3 5,000 IU daily. \par \par Menopause at 43 yrs old.

## 2022-08-26 LAB
25(OH)D3 SERPL-MCNC: 34.8 NG/ML
ALBUMIN SERPL ELPH-MCNC: 4.6 G/DL
ALP BLD-CCNC: 92 U/L
ALT SERPL-CCNC: 20 U/L
ANION GAP SERPL CALC-SCNC: 14 MMOL/L
AST SERPL-CCNC: 18 U/L
BILIRUB SERPL-MCNC: 0.3 MG/DL
BUN SERPL-MCNC: 17 MG/DL
CALCIUM SERPL-MCNC: 9.6 MG/DL
CALCIUM SERPL-MCNC: 9.6 MG/DL
CHLORIDE SERPL-SCNC: 107 MMOL/L
CO2 SERPL-SCNC: 24 MMOL/L
CREAT SERPL-MCNC: 1 MG/DL
EGFR: 64 ML/MIN/1.73M2
GLUCOSE SERPL-MCNC: 100 MG/DL
MAGNESIUM SERPL-MCNC: 2.1 MG/DL
PARATHYROID HORMONE INTACT: 43 PG/ML
PHOSPHATE SERPL-MCNC: 3.9 MG/DL
POTASSIUM SERPL-SCNC: 4.2 MMOL/L
PROT SERPL-MCNC: 7.7 G/DL
SODIUM SERPL-SCNC: 145 MMOL/L
T3FREE SERPL-MCNC: 2.9 PG/ML
T4 FREE SERPL-MCNC: 1.1 NG/DL
TSH SERPL-ACNC: 1.7 UIU/ML
VIT B12 SERPL-MCNC: 570 PG/ML

## 2022-08-29 LAB — OSTEOCALCIN SERPL-MCNC: 6.3 NG/ML

## 2022-08-31 LAB — ALP BONE SERPL-MCNC: 21.4 UG/L

## 2022-08-31 RX ORDER — MAGNESIUM 200 MG
1000 TABLET ORAL
Qty: 45 | Refills: 1 | Status: ACTIVE | COMMUNITY
Start: 2022-03-09 | End: 1900-01-01

## 2022-09-02 LAB — COLLAGEN CTX SERPL-MCNC: 173 PG/ML

## 2023-03-03 ENCOUNTER — APPOINTMENT (OUTPATIENT)
Dept: ENDOCRINOLOGY | Facility: CLINIC | Age: 63
End: 2023-03-03
Payer: COMMERCIAL

## 2023-03-03 VITALS
HEART RATE: 82 BPM | TEMPERATURE: 97 F | OXYGEN SATURATION: 99 % | BODY MASS INDEX: 26.22 KG/M2 | DIASTOLIC BLOOD PRESSURE: 80 MMHG | HEIGHT: 63 IN | WEIGHT: 148 LBS | SYSTOLIC BLOOD PRESSURE: 132 MMHG

## 2023-03-03 LAB
GLUCOSE BLDC GLUCOMTR-MCNC: 88
HBA1C MFR BLD HPLC: 7.7

## 2023-03-03 PROCEDURE — 82962 GLUCOSE BLOOD TEST: CPT

## 2023-03-03 PROCEDURE — 83036 HEMOGLOBIN GLYCOSYLATED A1C: CPT | Mod: QW

## 2023-03-03 PROCEDURE — 99214 OFFICE O/P EST MOD 30 MIN: CPT

## 2023-03-06 LAB
25(OH)D3 SERPL-MCNC: 59.8 NG/ML
ALBUMIN SERPL ELPH-MCNC: 4.5 G/DL
ALP BLD-CCNC: 87 U/L
ALT SERPL-CCNC: 19 U/L
ANION GAP SERPL CALC-SCNC: 16 MMOL/L
AST SERPL-CCNC: 14 U/L
BILIRUB SERPL-MCNC: 0.4 MG/DL
BUN SERPL-MCNC: 22 MG/DL
CALCIUM SERPL-MCNC: 9.8 MG/DL
CHLORIDE SERPL-SCNC: 102 MMOL/L
CHOLEST SERPL-MCNC: 247 MG/DL
CO2 SERPL-SCNC: 24 MMOL/L
CREAT SERPL-MCNC: 0.98 MG/DL
CREAT SPEC-SCNC: 90 MG/DL
EGFR: 66 ML/MIN/1.73M2
ESTIMATED AVERAGE GLUCOSE: 171 MG/DL
FRUCTOSAMINE SERPL-MCNC: 287 UMOL/L
GLUCOSE SERPL-MCNC: 77 MG/DL
GLYCOMARK.: 9.6 UG/ML
HBA1C MFR BLD HPLC: 7.6 %
HDLC SERPL-MCNC: 54 MG/DL
LDLC SERPL DIRECT ASSAY-MCNC: 166 MG/DL
MAGNESIUM SERPL-MCNC: 2.1 MG/DL
MICROALBUMIN 24H UR DL<=1MG/L-MCNC: 1.3 MG/DL
MICROALBUMIN/CREAT 24H UR-RTO: 15 MG/G
POTASSIUM SERPL-SCNC: 5 MMOL/L
PROT SERPL-MCNC: 7.4 G/DL
SODIUM SERPL-SCNC: 142 MMOL/L
T3FREE SERPL-MCNC: 2.53 PG/ML
T4 FREE SERPL-MCNC: 1 NG/DL
TRIGL SERPL-MCNC: 209 MG/DL
TSH SERPL-ACNC: 1.81 UIU/ML

## 2023-03-12 NOTE — HISTORY OF PRESENT ILLNESS
[FreeTextEntry1] :  is a 62 year old female who returns today in follow up with regard to a history of type 2 diabetes mellitus. Diabetes has been present for about 6/7 years. There is no known history of retinopathy, nephropathy. She too denies any history of neuropathy.\par \par  Current DM medication includes Metformin 1000 mg bid.\par \par HGM tested 2x per day of late has shown values to be running in the 120s-130s. She does note some values in the 200s. She is testing 2x daily.  There has been no significant hypoglycemia. \par \par She did have dental work and was given steroid tx which she notes raised her BG values to  the mid 200s. \par \par She denies any chest pain, sob, neurologic or ophthalmologic complaints. She too denies any new podiatric concerns. She is up to date with her ophthalmologic visit. She does have a cataract in the right eye. \par \par POCT A1C returned today 7.7%; previously  7.9 % on 8/25/22. \par POCT glucose returned today at 88 mg/dl\par \par Additional diagnoses include of that: HTN, Hyperlipidemia. Taking Rosuvastatin 5 mg and Metoprolol 50 mg bid. Off losartan. She does note general body aches. \par Is on jesus red, vit d 5,000 iu and quinol with Tumeric and Probiotic. Patient follows with Dr. Eid. \par Too takes jesus red and biotin\par \par DEXA bone density scan done in June 2022 revealed osteoporosis in the spine with the values: \par Spine: -2.7\par Femoral neck: -2.2\par Total Hip: -1.1\par \par She is currently taking vitamin d3 5,000 IU daily. \par \par Menopause at 43 yrs old. \par

## 2023-06-01 ENCOUNTER — RX RENEWAL (OUTPATIENT)
Age: 63
End: 2023-06-01

## 2023-08-30 ENCOUNTER — APPOINTMENT (OUTPATIENT)
Dept: ENDOCRINOLOGY | Facility: CLINIC | Age: 63
End: 2023-08-30
Payer: COMMERCIAL

## 2023-08-30 VITALS
DIASTOLIC BLOOD PRESSURE: 82 MMHG | OXYGEN SATURATION: 98 % | HEIGHT: 63 IN | WEIGHT: 147.31 LBS | HEART RATE: 85 BPM | SYSTOLIC BLOOD PRESSURE: 136 MMHG | TEMPERATURE: 98 F | BODY MASS INDEX: 26.1 KG/M2

## 2023-08-30 LAB
GLUCOSE BLDC GLUCOMTR-MCNC: 83
HBA1C MFR BLD HPLC: 7.1

## 2023-08-30 PROCEDURE — 83036 HEMOGLOBIN GLYCOSYLATED A1C: CPT | Mod: QW

## 2023-08-30 PROCEDURE — 82962 GLUCOSE BLOOD TEST: CPT

## 2023-08-30 PROCEDURE — 99214 OFFICE O/P EST MOD 30 MIN: CPT | Mod: 25

## 2023-08-30 PROCEDURE — 36415 COLL VENOUS BLD VENIPUNCTURE: CPT

## 2023-08-30 NOTE — ADDENDUM
[FreeTextEntry1] : POCT glucose testing and Hemoglobin A1c was carried out today given diabetes diagnosis. Blood will be drawn in office today.

## 2023-08-30 NOTE — HISTORY OF PRESENT ILLNESS
[FreeTextEntry1] :  is a 63 year old female who returns today in follow up with regard to a history of type 2 diabetes mellitus. Diabetes has been present for about 6/7 years. There is no known history of retinopathy, nephropathy. She too denies any history of neuropathy.  Current DM medication includes Metformin 1000 mg bid and Januvia 100 mg. Patient have not been taking Januvia because she looked up the s/e and became afraid.   HGM tested 2x per day of late has shown values to be running low in 's to 120. In PM low 100's If eats sugary treat or carbohydrate can go up to 160. Had glucose of 236 once when she had soda and pasta. She is testing 2x daily. There has been no significant hypoglycemia.  She denies any chest pain, sob, neurologic or ophthalmologic complaints. She too denies any new podiatric concerns. She is due for her ophthalmologic visit. She does have a cataract in the right eye.  POCT A1C returned today at 7.1%; previously 7.6% on 03/03/2023. POCT glucose returned today at 83 mg/dl  Patient has been having feeling hot in left ear since June 2023, was seen in urgent care, completed abx for redness in ear, pending to see ENT on September 6th.   She also notes increased fatigue as of late. She states that she wakes up 1-2x nightly and it takes her 3-4 hours to go back to sleep.    Additional diagnoses include of that: HTN, Hyperlipidemia. Taking Rosuvastatin 5 mg and Metoprolol 50 mg bid. Off losartan. She does note general body aches.  Is on jesus red, vitamin b12 QOD sublingual, vit d 5,000 iu and quinol with Tumeric and Probiotic. Patient follows with Dr. Eid.  DEXA bone density scan done in June 2022 revealed osteoporosis in the spine with the values: Spine: -2.7 Femoral neck: -2.2 Total Hip: -1.1  She is currently taking vitamin d3 5,000 IU daily. Menopause at 43 yrs old.

## 2023-08-31 ENCOUNTER — RESULT REVIEW (OUTPATIENT)
Age: 63
End: 2023-08-31

## 2023-08-31 LAB
25(OH)D3 SERPL-MCNC: 56.4 NG/ML
ALBUMIN SERPL ELPH-MCNC: 4.9 G/DL
ALP BLD-CCNC: 100 U/L
ALT SERPL-CCNC: 19 U/L
ANION GAP SERPL CALC-SCNC: 16 MMOL/L
AST SERPL-CCNC: 15 U/L
BILIRUB SERPL-MCNC: 0.2 MG/DL
BUN SERPL-MCNC: 22 MG/DL
CALCIUM SERPL-MCNC: 10.3 MG/DL
CALCIUM SERPL-MCNC: 10.3 MG/DL
CHLORIDE SERPL-SCNC: 102 MMOL/L
CHOLEST SERPL-MCNC: 195 MG/DL
CO2 SERPL-SCNC: 22 MMOL/L
CREAT SERPL-MCNC: 0.93 MG/DL
CREAT SPEC-SCNC: 91 MG/DL
EGFR: 69 ML/MIN/1.73M2
FERRITIN SERPL-MCNC: 52 NG/ML
FOLATE SERPL-MCNC: 15.8 NG/ML
FRUCTOSAMINE SERPL-MCNC: 294 UMOL/L
GLUCOSE SERPL-MCNC: 96 MG/DL
GLYCOMARK.: 15.2 UG/ML
HCT VFR BLD CALC: 40.3 %
HDLC SERPL-MCNC: 55 MG/DL
HGB BLD-MCNC: 13 G/DL
IRON SERPL-MCNC: 58 UG/DL
LDLC SERPL CALC-MCNC: 110 MG/DL
MAGNESIUM SERPL-MCNC: 2 MG/DL
MCHC RBC-ENTMCNC: 28.6 PG
MCHC RBC-ENTMCNC: 32.3 GM/DL
MCV RBC AUTO: 88.8 FL
MICROALBUMIN 24H UR DL<=1MG/L-MCNC: 2.6 MG/DL
MICROALBUMIN/CREAT 24H UR-RTO: 29 MG/G
NONHDLC SERPL-MCNC: 140 MG/DL
PARATHYROID HORMONE INTACT: 30 PG/ML
PHOSPHATE SERPL-MCNC: 4.4 MG/DL
PLATELET # BLD AUTO: 369 K/UL
POTASSIUM SERPL-SCNC: 4.3 MMOL/L
PROT SERPL-MCNC: 7.8 G/DL
RBC # BLD: 4.54 M/UL
RBC # FLD: 15.9 %
SODIUM SERPL-SCNC: 141 MMOL/L
T3FREE SERPL-MCNC: 2.53 PG/ML
T4 FREE SERPL-MCNC: 1.1 NG/DL
TRIGL SERPL-MCNC: 172 MG/DL
TSH SERPL-ACNC: 1.81 UIU/ML
VIT B12 SERPL-MCNC: 1831 PG/ML
WBC # FLD AUTO: 9.49 K/UL

## 2023-09-01 LAB
ESTIMATED AVERAGE GLUCOSE: 166 MG/DL
HBA1C MFR BLD HPLC: 7.4 %

## 2023-09-15 ENCOUNTER — RX RENEWAL (OUTPATIENT)
Age: 63
End: 2023-09-15

## 2023-09-15 NOTE — DIETITIAN INITIAL EVALUATION ADULT. - ORAL INTAKE PTA/DIET HISTORY
8 Pt with NKFA, no noted micronutrient supplementation PTA per H&P.  Pt with poor PO intake x4 days PTA in setting of acute illness.  History of type 2 DM, on metformin, HbA1c 7.8%. Unable to assess adherence to therapeutic diet.

## 2023-10-05 ENCOUNTER — RX RENEWAL (OUTPATIENT)
Age: 63
End: 2023-10-05

## 2023-12-06 ENCOUNTER — APPOINTMENT (OUTPATIENT)
Dept: ENDOCRINOLOGY | Facility: CLINIC | Age: 63
End: 2023-12-06
Payer: COMMERCIAL

## 2023-12-06 VITALS
DIASTOLIC BLOOD PRESSURE: 80 MMHG | OXYGEN SATURATION: 95 % | SYSTOLIC BLOOD PRESSURE: 120 MMHG | HEIGHT: 63 IN | TEMPERATURE: 98 F | HEART RATE: 84 BPM | WEIGHT: 146.5 LBS | BODY MASS INDEX: 25.96 KG/M2

## 2023-12-06 LAB
GLUCOSE BLDC GLUCOMTR-MCNC: 130
HBA1C MFR BLD HPLC: 8.2

## 2023-12-06 PROCEDURE — 83036 HEMOGLOBIN GLYCOSYLATED A1C: CPT | Mod: QW

## 2023-12-06 PROCEDURE — 82962 GLUCOSE BLOOD TEST: CPT

## 2023-12-06 PROCEDURE — 99214 OFFICE O/P EST MOD 30 MIN: CPT | Mod: 25

## 2023-12-06 PROCEDURE — 36415 COLL VENOUS BLD VENIPUNCTURE: CPT

## 2023-12-06 RX ORDER — SITAGLIPTIN 100 MG/1
100 TABLET, FILM COATED ORAL
Qty: 90 | Refills: 1 | Status: DISCONTINUED | COMMUNITY
Start: 2023-03-03 | End: 2023-12-06

## 2023-12-06 RX ORDER — METFORMIN HYDROCHLORIDE 1000 MG/1
1000 TABLET, COATED ORAL TWICE DAILY
Qty: 180 | Refills: 3 | Status: ACTIVE | COMMUNITY
Start: 2021-06-07 | End: 1900-01-01

## 2023-12-07 ENCOUNTER — RX RENEWAL (OUTPATIENT)
Age: 63
End: 2023-12-07

## 2023-12-07 LAB
25(OH)D3 SERPL-MCNC: 37.3 NG/ML
ALBUMIN SERPL ELPH-MCNC: 4.7 G/DL
ALP BLD-CCNC: 101 U/L
ALT SERPL-CCNC: 22 U/L
ANION GAP SERPL CALC-SCNC: 16 MMOL/L
AST SERPL-CCNC: 17 U/L
BASOPHILS # BLD AUTO: 0.05 K/UL
BASOPHILS NFR BLD AUTO: 0.6 %
BILIRUB SERPL-MCNC: 0.3 MG/DL
BUN SERPL-MCNC: 21 MG/DL
CALCIUM SERPL-MCNC: 10.5 MG/DL
CHLORIDE SERPL-SCNC: 105 MMOL/L
CHOLEST SERPL-MCNC: 147 MG/DL
CO2 SERPL-SCNC: 23 MMOL/L
CREAT SERPL-MCNC: 1.26 MG/DL
CREAT SPEC-SCNC: 45 MG/DL
EGFR: 48 ML/MIN/1.73M2
EOSINOPHIL # BLD AUTO: 0.2 K/UL
EOSINOPHIL NFR BLD AUTO: 2.3 %
ESTIMATED AVERAGE GLUCOSE: 163 MG/DL
FRUCTOSAMINE SERPL-MCNC: 294 UMOL/L
GLUCOSE SERPL-MCNC: 96 MG/DL
GLYCOMARK.: 11.7 UG/ML
HBA1C MFR BLD HPLC: 7.3 %
HCT VFR BLD CALC: 40.5 %
HDLC SERPL-MCNC: 44 MG/DL
HGB BLD-MCNC: 12.8 G/DL
IMM GRANULOCYTES NFR BLD AUTO: 0.5 %
LDLC SERPL DIRECT ASSAY-MCNC: 72 MG/DL
LYMPHOCYTES # BLD AUTO: 1.49 K/UL
LYMPHOCYTES NFR BLD AUTO: 17.4 %
MAGNESIUM SERPL-MCNC: 2.2 MG/DL
MAN DIFF?: NORMAL
MCHC RBC-ENTMCNC: 28.1 PG
MCHC RBC-ENTMCNC: 31.6 GM/DL
MCV RBC AUTO: 89 FL
MICROALBUMIN 24H UR DL<=1MG/L-MCNC: 1.4 MG/DL
MICROALBUMIN/CREAT 24H UR-RTO: 31 MG/G
MONOCYTES # BLD AUTO: 0.77 K/UL
MONOCYTES NFR BLD AUTO: 9 %
NEUTROPHILS # BLD AUTO: 6 K/UL
NEUTROPHILS NFR BLD AUTO: 70.2 %
PLATELET # BLD AUTO: 328 K/UL
POTASSIUM SERPL-SCNC: 4.7 MMOL/L
PROT SERPL-MCNC: 7.5 G/DL
RBC # BLD: 4.55 M/UL
RBC # FLD: 15.8 %
SODIUM SERPL-SCNC: 144 MMOL/L
T3FREE SERPL-MCNC: 2.37 PG/ML
T4 FREE SERPL-MCNC: 1.1 NG/DL
TRIGL SERPL-MCNC: 220 MG/DL
TSH SERPL-ACNC: 1.48 UIU/ML
VIT B12 SERPL-MCNC: 835 PG/ML
WBC # FLD AUTO: 8.55 K/UL

## 2024-01-09 ENCOUNTER — APPOINTMENT (OUTPATIENT)
Dept: CARDIOLOGY | Facility: CLINIC | Age: 64
End: 2024-01-09
Payer: COMMERCIAL

## 2024-01-09 VITALS
HEIGHT: 63 IN | HEART RATE: 78 BPM | OXYGEN SATURATION: 97 % | BODY MASS INDEX: 25.87 KG/M2 | DIASTOLIC BLOOD PRESSURE: 90 MMHG | TEMPERATURE: 97.6 F | SYSTOLIC BLOOD PRESSURE: 150 MMHG | WEIGHT: 146 LBS

## 2024-01-09 VITALS — SYSTOLIC BLOOD PRESSURE: 152 MMHG | DIASTOLIC BLOOD PRESSURE: 94 MMHG

## 2024-01-09 DIAGNOSIS — R42 DIZZINESS AND GIDDINESS: ICD-10-CM

## 2024-01-09 DIAGNOSIS — Z00.00 ENCOUNTER FOR GENERAL ADULT MEDICAL EXAMINATION W/OUT ABNORMAL FINDINGS: ICD-10-CM

## 2024-01-09 PROCEDURE — 99214 OFFICE O/P EST MOD 30 MIN: CPT | Mod: 25

## 2024-01-09 PROCEDURE — 93000 ELECTROCARDIOGRAM COMPLETE: CPT

## 2024-01-09 RX ORDER — MELOXICAM 7.5 MG/1
7.5 TABLET ORAL
Qty: 7 | Refills: 1 | Status: ACTIVE | COMMUNITY
Start: 2024-01-09 | End: 1900-01-01

## 2024-01-09 NOTE — HISTORY OF PRESENT ILLNESS
[FreeTextEntry1] : The patient presents for evaluation of high blood pressure. Patient is currently tolerating the current antihypertensive regime and they deny headaches, stiff neck, visual changes, pedal Edema or PND. They also are here for follow-up of elevated cholesterol. Patient is currently tolerating medication and and does not complain of new muscle pain, joint pain, back pain,urinary changes ,nausea, vomiting, abdominal pain or diarrhea. The patient is trying to follow a low cholesterol diet. Patient c/o joint pain .Also reports anxiety .BP in office is 152/94 mmhg  The patient also presents for continued care of diabetes mellitus. Patient is following the diabetic regimen. Patient is tolerating hypoglycemic agents and following the diet. Patient denies any visual changes, blood in the urine, abdominal pain, nausea, vomiting, myalgias, arthralgias, paresthesia's and syncope. The patient denies any chest discomfort, shortness of breath or new neurologic signs or symptoms. Patient denies any polyuria, worsening nocturia, or polydipsia.    Patient complains of episode of dizziness consisting of a sensation of movement with positional change followed by feeling of movement they deny spinning sensation while standing still. Patient states that they do have symptoms that come and go depending on what movement they make. They do not feel as if they are going to pass out and cannot completely explain the sensation. The symptoms are not always constant and comes a goes without particular pattern. They deny weakness, of limbs, facial drooping, difficulty speaking or blurred/loss of vision.

## 2024-01-10 ENCOUNTER — RX RENEWAL (OUTPATIENT)
Age: 64
End: 2024-01-10

## 2024-01-26 RX ORDER — BLOOD SUGAR DIAGNOSTIC
STRIP MISCELLANEOUS
Qty: 3 | Refills: 3 | Status: ACTIVE | COMMUNITY
Start: 2020-03-05 | End: 1900-01-01

## 2024-01-27 ENCOUNTER — RX RENEWAL (OUTPATIENT)
Age: 64
End: 2024-01-27

## 2024-02-07 ENCOUNTER — OUTPATIENT (OUTPATIENT)
Dept: OUTPATIENT SERVICES | Facility: HOSPITAL | Age: 64
LOS: 1 days | End: 2024-02-07
Payer: COMMERCIAL

## 2024-02-07 ENCOUNTER — APPOINTMENT (OUTPATIENT)
Dept: CT IMAGING | Facility: CLINIC | Age: 64
End: 2024-02-07
Payer: COMMERCIAL

## 2024-02-07 DIAGNOSIS — E78.5 HYPERLIPIDEMIA, UNSPECIFIED: ICD-10-CM

## 2024-02-07 PROCEDURE — 75571 CT HRT W/O DYE W/CA TEST: CPT | Mod: 26

## 2024-02-07 PROCEDURE — 75571 CT HRT W/O DYE W/CA TEST: CPT

## 2024-02-16 ENCOUNTER — NON-APPOINTMENT (OUTPATIENT)
Age: 64
End: 2024-02-16

## 2024-03-27 ENCOUNTER — APPOINTMENT (OUTPATIENT)
Dept: ENDOCRINOLOGY | Facility: CLINIC | Age: 64
End: 2024-03-27
Payer: COMMERCIAL

## 2024-03-27 VITALS
OXYGEN SATURATION: 98 % | BODY MASS INDEX: 25.78 KG/M2 | HEART RATE: 88 BPM | WEIGHT: 145.5 LBS | HEIGHT: 63 IN | SYSTOLIC BLOOD PRESSURE: 162 MMHG | TEMPERATURE: 98.2 F | DIASTOLIC BLOOD PRESSURE: 90 MMHG

## 2024-03-27 DIAGNOSIS — R53.83 OTHER FATIGUE: ICD-10-CM

## 2024-03-27 DIAGNOSIS — E11.9 TYPE 2 DIABETES MELLITUS W/OUT COMPLICATIONS: ICD-10-CM

## 2024-03-27 DIAGNOSIS — R80.9 PROTEINURIA, UNSPECIFIED: ICD-10-CM

## 2024-03-27 LAB
GLUCOSE BLDC GLUCOMTR-MCNC: 103
HBA1C MFR BLD HPLC: 7.3

## 2024-03-27 PROCEDURE — 36415 COLL VENOUS BLD VENIPUNCTURE: CPT

## 2024-03-27 PROCEDURE — 99214 OFFICE O/P EST MOD 30 MIN: CPT

## 2024-03-27 PROCEDURE — 82962 GLUCOSE BLOOD TEST: CPT

## 2024-03-27 PROCEDURE — 83036 HEMOGLOBIN GLYCOSYLATED A1C: CPT | Mod: QW

## 2024-03-27 RX ORDER — SITAGLIPTIN 100 MG/1
100 TABLET, FILM COATED ORAL
Qty: 90 | Refills: 2 | Status: DISCONTINUED | COMMUNITY
Start: 2023-12-07 | End: 2024-03-27

## 2024-03-27 RX ORDER — METFORMIN HYDROCHLORIDE 500 MG/1
500 TABLET, COATED ORAL
Qty: 90 | Refills: 3 | Status: ACTIVE | COMMUNITY
Start: 2024-03-27

## 2024-03-27 RX ORDER — SEMAGLUTIDE 0.68 MG/ML
2 INJECTION, SOLUTION SUBCUTANEOUS
Qty: 3 | Refills: 3 | Status: ACTIVE | COMMUNITY
Start: 2024-03-27 | End: 1900-01-01

## 2024-03-27 NOTE — ADDENDUM
[FreeTextEntry1] : POCT glucose testing and Hemoglobin A1c was carried out today given diabetes diagnosis.  This note was written by Jasmin Stiles on 03/27/2024 acting as medical scribe for Dr. Mark Travis. I, Dr. Mark Travis, have read and attest that all the information, medical decision making and discharge instructions within are true and accurate.

## 2024-03-27 NOTE — PHYSICAL EXAM
[Alert] : alert [Well Nourished] : well nourished [No Acute Distress] : no acute distress [Well Developed] : well developed [Normal Sclera/Conjunctiva] : normal sclera/conjunctiva [No Proptosis] : no proptosis [EOMI] : extra ocular movement intact [Normal Oropharynx] : the oropharynx was normal [Thyroid Not Enlarged] : the thyroid was not enlarged [No Thyroid Nodules] : no palpable thyroid nodules [No Respiratory Distress] : no respiratory distress [No Accessory Muscle Use] : no accessory muscle use [Clear to Auscultation] : lungs were clear to auscultation bilaterally [Normal S1, S2] : normal S1 and S2 [Regular Rhythm] : with a regular rhythm [Normal Rate] : heart rate was normal [No Edema] : no peripheral edema [Pedal Pulses Normal] : the pedal pulses are present [Not Tender] : non-tender [Normal Bowel Sounds] : normal bowel sounds [Not Distended] : not distended [Soft] : abdomen soft [Normal Anterior Cervical Nodes] : no anterior cervical lymphadenopathy [Normal Posterior Cervical Nodes] : no posterior cervical lymphadenopathy [Spine Straight] : spine straight [No Spinal Tenderness] : no spinal tenderness [No Stigmata of Cushings Syndrome] : no stigmata of Cushings Syndrome [Normal Gait] : normal gait [Normal Strength/Tone] : muscle strength and tone were normal [No Rash] : no rash [Normal Reflexes] : deep tendon reflexes were 2+ and symmetric [No Tremors] : no tremors [Oriented x3] : oriented to person, place, and time [Acanthosis Nigricans] : no acanthosis nigricans

## 2024-03-27 NOTE — HISTORY OF PRESENT ILLNESS
[FreeTextEntry1] : Ms. AGUILAR is a 63-year-old female who returns today in follow up with regard to a history of type 2 diabetes mellitus. There is no known history of retinopathy, nephropathy. She too denies any history of neuropathy- she does note a recent short episode of left great toe numbness.    Current DM medication include Metformin 1000 mg BiD; notes diarrhea 2-3x weekly, unsure.  - Unable to tolerate Januvia; had GI upset.   She denies any chest pain, sob, neurologic or ophthalmologic complaints. She too denies any new podiatric concerns. She is up to date with her ophthalmologic visit- no diabetic changes noted.  HGM of late has shown values to be running mostly 120s in the morning and sometimes in the 80s-90s, and in the 120s to 140s before dinner. She does deny any significant hypoglycemic signs and symptoms.   POCT A1C returned today at 7.3%, previously 7.3% in December 2023.  POCT glucose today at 103 mg/dL.  ____________________________________________ Patient notes ongoing fatigue. Does have trouble sleeping; has nocturia about 2x then will remain awake for even 4 hours.   Additional diagnoses include HTN, Hyperlipidemia.   Additional Medications: Rosuvastatin 5 mg and Losartan 100 mg daily. Supplements: Dustin red, vitamin D3 5,000 IU daily, Quinol, Turmeric, Biotin and Probiotic.   Patient follows with Dr. Eid.

## 2024-03-28 LAB
25(OH)D3 SERPL-MCNC: 38.2 NG/ML
ALBUMIN SERPL ELPH-MCNC: 4.7 G/DL
ALP BLD-CCNC: 90 U/L
ALT SERPL-CCNC: 23 U/L
ANION GAP SERPL CALC-SCNC: 12 MMOL/L
AST SERPL-CCNC: 18 U/L
BILIRUB SERPL-MCNC: 0.2 MG/DL
BUN SERPL-MCNC: 19 MG/DL
CALCIUM SERPL-MCNC: 10.2 MG/DL
CALCIUM SERPL-MCNC: 10.2 MG/DL
CHLORIDE SERPL-SCNC: 101 MMOL/L
CHOLEST SERPL-MCNC: 249 MG/DL
CO2 SERPL-SCNC: 28 MMOL/L
CREAT SERPL-MCNC: 0.99 MG/DL
CREAT SPEC-SCNC: 112 MG/DL
EGFR: 64 ML/MIN/1.73M2
ESTIMATED AVERAGE GLUCOSE: 148 MG/DL
FRUCTOSAMINE SERPL-MCNC: 280 UMOL/L
GLUCOSE SERPL-MCNC: 80 MG/DL
GLYCOMARK.: 14.3 UG/ML
HBA1C MFR BLD HPLC: 6.8 %
HDLC SERPL-MCNC: 54 MG/DL
LDLC SERPL CALC-MCNC: 156 MG/DL
LDLC SERPL DIRECT ASSAY-MCNC: 168 MG/DL
MAGNESIUM SERPL-MCNC: 2.2 MG/DL
MICROALBUMIN 24H UR DL<=1MG/L-MCNC: 2.9 MG/DL
MICROALBUMIN/CREAT 24H UR-RTO: 26 MG/G
NONHDLC SERPL-MCNC: 195 MG/DL
PARATHYROID HORMONE INTACT: 36 PG/ML
PHOSPHATE SERPL-MCNC: 3.9 MG/DL
POTASSIUM SERPL-SCNC: 4.7 MMOL/L
PROT SERPL-MCNC: 7.9 G/DL
SODIUM SERPL-SCNC: 141 MMOL/L
T3FREE SERPL-MCNC: 2.46 PG/ML
T4 FREE SERPL-MCNC: 0.9 NG/DL
TRIGL SERPL-MCNC: 217 MG/DL
TSH SERPL-ACNC: 2.26 UIU/ML

## 2024-04-01 ENCOUNTER — RX RENEWAL (OUTPATIENT)
Age: 64
End: 2024-04-01

## 2024-04-01 LAB — CA-I SERPL-SCNC: 5.2 MG/DL

## 2024-04-02 LAB
ALP BONE SERPL-MCNC: 18.4 UG/L
COLLAGEN CTX SERPL-MCNC: 252 PG/ML
OSTEOCALCIN SERPL-MCNC: 6 NG/ML

## 2024-04-10 ENCOUNTER — NON-APPOINTMENT (OUTPATIENT)
Age: 64
End: 2024-04-10

## 2024-04-12 ENCOUNTER — APPOINTMENT (OUTPATIENT)
Dept: INTERNAL MEDICINE | Facility: CLINIC | Age: 64
End: 2024-04-12
Payer: COMMERCIAL

## 2024-04-12 VITALS
WEIGHT: 146 LBS | DIASTOLIC BLOOD PRESSURE: 90 MMHG | TEMPERATURE: 97.2 F | BODY MASS INDEX: 25.87 KG/M2 | HEART RATE: 99 BPM | OXYGEN SATURATION: 97 % | SYSTOLIC BLOOD PRESSURE: 150 MMHG | HEIGHT: 63 IN

## 2024-04-12 PROCEDURE — 93000 ELECTROCARDIOGRAM COMPLETE: CPT

## 2024-04-12 PROCEDURE — 99214 OFFICE O/P EST MOD 30 MIN: CPT

## 2024-04-12 PROCEDURE — 99204 OFFICE O/P NEW MOD 45 MIN: CPT

## 2024-04-12 NOTE — PLAN
[FreeTextEntry1] : HTN- uncontrolled, EKG reveals NSR with no acute ST deviation, will c/w metoprolol and add amlodipine 2.5 mg.  Advised diet/ls modifications.  Pt will monitor home BP and f/u in 2-4 weeks for BP check. DM-2 - controlled, c/w same HLD - on statin

## 2024-04-12 NOTE — HISTORY OF PRESENT ILLNESS
[FreeTextEntry8] : 63 year old female presents with complaints of elevated blood pressure readings at home for the past 2-3 weeks.  She has tried to take an extra dose of metoprolol but that doesn't seem to help.   Currently she denies any chest pain, shortness of breath or cough.   [Family Member] : family member

## 2024-04-12 NOTE — REVIEW OF SYSTEMS
[Fever] : no fever [Night Sweats] : no night sweats [Discharge] : no discharge [Vision Problems] : no vision problems [Earache] : no earache [Nasal Discharge] : no nasal discharge [Chest Pain] : no chest pain [Palpitations] : palpitations [Orthopnea] : no orthopnea [Shortness Of Breath] : no shortness of breath [Abdominal Pain] : no abdominal pain [Vomiting] : no vomiting [Dysuria] : no dysuria [Hematuria] : no hematuria [Joint Pain] : no joint pain [Muscle Pain] : no muscle pain [Itching] : no itching [Skin Rash] : no skin rash [Headache] : no headache [Memory Loss] : no memory loss [Suicidal] : not suicidal [Easy Bleeding] : no easy bleeding

## 2024-04-22 RX ORDER — ROSUVASTATIN CALCIUM 10 MG/1
10 TABLET, FILM COATED ORAL
Qty: 90 | Refills: 0 | Status: ACTIVE | COMMUNITY
Start: 2020-07-08 | End: 1900-01-01

## 2024-05-17 ENCOUNTER — APPOINTMENT (OUTPATIENT)
Dept: INTERNAL MEDICINE | Facility: CLINIC | Age: 64
End: 2024-05-17
Payer: COMMERCIAL

## 2024-05-17 VITALS
WEIGHT: 146 LBS | SYSTOLIC BLOOD PRESSURE: 160 MMHG | HEART RATE: 96 BPM | TEMPERATURE: 98.2 F | BODY MASS INDEX: 25.87 KG/M2 | OXYGEN SATURATION: 98 % | HEIGHT: 63 IN | DIASTOLIC BLOOD PRESSURE: 100 MMHG

## 2024-05-17 PROCEDURE — 99214 OFFICE O/P EST MOD 30 MIN: CPT

## 2024-05-17 PROCEDURE — G2211 COMPLEX E/M VISIT ADD ON: CPT | Mod: NC,1L

## 2024-05-17 RX ORDER — METOPROLOL TARTRATE 100 MG/1
100 TABLET, FILM COATED ORAL TWICE DAILY
Qty: 180 | Refills: 1 | Status: ACTIVE | COMMUNITY
Start: 2021-06-07 | End: 1900-01-01

## 2024-05-17 RX ORDER — HYDROCHLOROTHIAZIDE 12.5 MG/1
12.5 TABLET ORAL
Qty: 90 | Refills: 1 | Status: ACTIVE | COMMUNITY
Start: 2024-05-17 | End: 1900-01-01

## 2024-05-17 RX ORDER — AMLODIPINE BESYLATE 2.5 MG/1
2.5 TABLET ORAL DAILY
Qty: 90 | Refills: 1 | Status: DISCONTINUED | COMMUNITY
Start: 2024-04-12 | End: 2024-05-17

## 2024-05-17 NOTE — HISTORY OF PRESENT ILLNESS
[Family Member] : family member [FreeTextEntry1] : follow-up [de-identified] : 64 year old female here for a f/u visit. Patient was started on amlodipine 2.5 mg at last visit but self discontinued it due to ankle swelling.  Since then she has been taking 2 metoprolol tablets of 50 mg (100 mg total) twice a day without improvement in BP.  Currently she denies any chest pain, shortness of breath or cough.

## 2024-05-17 NOTE — PLAN
[FreeTextEntry1] : HTN - uncontrolled, c/w metoprolol 100 mg bid and add HCTZ 12.5.  Pt self discontinued amlodipine due to ankle swelling.  Advised diet/ls modifications.  Pt advised to monitor home BP and f/u in 1-2 weeks for BP check DM-2 - controlled, c/w same HLD - c/w statin

## 2024-05-17 NOTE — REVIEW OF SYSTEMS
[Fever] : no fever [Night Sweats] : no night sweats [Discharge] : no discharge [Vision Problems] : no vision problems [Earache] : no earache [Nasal Discharge] : no nasal discharge [Chest Pain] : no chest pain [Orthopnea] : no orthopnea [Shortness Of Breath] : no shortness of breath [Abdominal Pain] : no abdominal pain [Vomiting] : no vomiting [Dysuria] : no dysuria [Hematuria] : no hematuria [Joint Pain] : no joint pain [Muscle Pain] : no muscle pain [Itching] : no itching [Skin Rash] : no skin rash [Headache] : no headache [Memory Loss] : no memory loss

## 2024-05-22 ENCOUNTER — NON-APPOINTMENT (OUTPATIENT)
Age: 64
End: 2024-05-22

## 2024-06-19 ENCOUNTER — APPOINTMENT (OUTPATIENT)
Dept: ENDOCRINOLOGY | Facility: CLINIC | Age: 64
End: 2024-06-19

## 2024-06-26 ENCOUNTER — APPOINTMENT (OUTPATIENT)
Dept: CARDIOLOGY | Facility: CLINIC | Age: 64
End: 2024-06-26
Payer: COMMERCIAL

## 2024-06-26 ENCOUNTER — NON-APPOINTMENT (OUTPATIENT)
Age: 64
End: 2024-06-26

## 2024-06-26 VITALS
HEART RATE: 97 BPM | OXYGEN SATURATION: 98 % | BODY MASS INDEX: 25.69 KG/M2 | SYSTOLIC BLOOD PRESSURE: 138 MMHG | WEIGHT: 145 LBS | DIASTOLIC BLOOD PRESSURE: 90 MMHG | HEIGHT: 63 IN | TEMPERATURE: 97.6 F

## 2024-06-26 VITALS — DIASTOLIC BLOOD PRESSURE: 88 MMHG | SYSTOLIC BLOOD PRESSURE: 126 MMHG

## 2024-06-26 DIAGNOSIS — R22.30 LOCALIZED SWELLING, MASS AND LUMP, UNSPECIFIED UPPER LIMB: ICD-10-CM

## 2024-06-26 DIAGNOSIS — I10 ESSENTIAL (PRIMARY) HYPERTENSION: ICD-10-CM

## 2024-06-26 DIAGNOSIS — R09.89 OTHER SPECIFIED SYMPTOMS AND SIGNS INVOLVING THE CIRCULATORY AND RESPIRATORY SYSTEMS: ICD-10-CM

## 2024-06-26 DIAGNOSIS — E55.9 VITAMIN D DEFICIENCY, UNSPECIFIED: ICD-10-CM

## 2024-06-26 DIAGNOSIS — E78.5 HYPERLIPIDEMIA, UNSPECIFIED: ICD-10-CM

## 2024-06-26 DIAGNOSIS — R79.89 OTHER SPECIFIED ABNORMAL FINDINGS OF BLOOD CHEMISTRY: ICD-10-CM

## 2024-06-26 DIAGNOSIS — I51.7 CARDIOMEGALY: ICD-10-CM

## 2024-06-26 DIAGNOSIS — E11.9 TYPE 2 DIABETES MELLITUS W/OUT COMPLICATIONS: ICD-10-CM

## 2024-06-26 DIAGNOSIS — M81.0 AGE-RELATED OSTEOPOROSIS W/OUT CURRENT PATHOLOGICAL FRACTURE: ICD-10-CM

## 2024-06-26 DIAGNOSIS — Z00.00 ENCOUNTER FOR GENERAL ADULT MEDICAL EXAMINATION W/OUT ABNORMAL FINDINGS: ICD-10-CM

## 2024-06-26 DIAGNOSIS — R91.1 SOLITARY PULMONARY NODULE: ICD-10-CM

## 2024-06-26 PROCEDURE — 99214 OFFICE O/P EST MOD 30 MIN: CPT | Mod: 25

## 2024-06-26 PROCEDURE — 93000 ELECTROCARDIOGRAM COMPLETE: CPT

## 2024-06-28 ENCOUNTER — APPOINTMENT (OUTPATIENT)
Dept: ENDOCRINOLOGY | Facility: CLINIC | Age: 64
End: 2024-06-28

## 2024-06-28 LAB
25(OH)D3 SERPL-MCNC: 29.9 NG/ML
ALBUMIN SERPL ELPH-MCNC: 4.6 G/DL
ALP BLD-CCNC: 102 U/L
ALT SERPL-CCNC: 31 U/L
ANION GAP SERPL CALC-SCNC: 16 MMOL/L
APO B SERPL-MCNC: 94 MG/DL
APO LP(A) SERPL-MCNC: 91.5 NMOL/L
AST SERPL-CCNC: 28 U/L
BILIRUB DIRECT SERPL-MCNC: 0.1 MG/DL
BILIRUB INDIRECT SERPL-MCNC: 0.2 MG/DL
BILIRUB SERPL-MCNC: 0.3 MG/DL
BUN SERPL-MCNC: 17 MG/DL
CALCIUM SERPL-MCNC: 9.8 MG/DL
CHLORIDE SERPL-SCNC: 101 MMOL/L
CHOLEST SERPL-MCNC: 174 MG/DL
CK SERPL-CCNC: 170 U/L
CO2 SERPL-SCNC: 24 MMOL/L
CREAT SERPL-MCNC: 0.98 MG/DL
CRP SERPL HS-MCNC: 2.79 MG/L
EGFR: 65 ML/MIN/1.73M2
ESTIMATED AVERAGE GLUCOSE: 183 MG/DL
FOLATE SERPL-MCNC: 12.9 NG/ML
GLUCOSE SERPL-MCNC: 95 MG/DL
HBA1C MFR BLD HPLC: 8 %
HDLC SERPL-MCNC: 48 MG/DL
LDLC SERPL CALC-MCNC: 98 MG/DL
LDLC SERPL DIRECT ASSAY-MCNC: 100 MG/DL
NONHDLC SERPL-MCNC: 127 MG/DL
POTASSIUM SERPL-SCNC: 4.2 MMOL/L
PROT SERPL-MCNC: 7.6 G/DL
SODIUM SERPL-SCNC: 141 MMOL/L
TRIGL SERPL-MCNC: 164 MG/DL
VIT B12 SERPL-MCNC: 560 PG/ML

## 2024-08-02 ENCOUNTER — NON-APPOINTMENT (OUTPATIENT)
Age: 64
End: 2024-08-02

## 2024-10-30 ENCOUNTER — APPOINTMENT (OUTPATIENT)
Dept: CARDIOLOGY | Facility: CLINIC | Age: 64
End: 2024-10-30
Payer: COMMERCIAL

## 2024-10-30 ENCOUNTER — APPOINTMENT (OUTPATIENT)
Dept: ENDOCRINOLOGY | Facility: CLINIC | Age: 64
End: 2024-10-30
Payer: COMMERCIAL

## 2024-10-30 VITALS
HEART RATE: 95 BPM | DIASTOLIC BLOOD PRESSURE: 82 MMHG | HEIGHT: 63 IN | BODY MASS INDEX: 25.69 KG/M2 | OXYGEN SATURATION: 98 % | SYSTOLIC BLOOD PRESSURE: 136 MMHG | WEIGHT: 145 LBS

## 2024-10-30 DIAGNOSIS — R80.9 PROTEINURIA, UNSPECIFIED: ICD-10-CM

## 2024-10-30 DIAGNOSIS — E11.9 TYPE 2 DIABETES MELLITUS W/OUT COMPLICATIONS: ICD-10-CM

## 2024-10-30 DIAGNOSIS — E04.1 NONTOXIC SINGLE THYROID NODULE: ICD-10-CM

## 2024-10-30 DIAGNOSIS — I10 ESSENTIAL (PRIMARY) HYPERTENSION: ICD-10-CM

## 2024-10-30 DIAGNOSIS — E55.9 VITAMIN D DEFICIENCY, UNSPECIFIED: ICD-10-CM

## 2024-10-30 DIAGNOSIS — M81.0 AGE-RELATED OSTEOPOROSIS W/OUT CURRENT PATHOLOGICAL FRACTURE: ICD-10-CM

## 2024-10-30 DIAGNOSIS — E78.5 HYPERLIPIDEMIA, UNSPECIFIED: ICD-10-CM

## 2024-10-30 LAB
GLUCOSE BLDC GLUCOMTR-MCNC: 123
HBA1C MFR BLD HPLC: 8.1

## 2024-10-30 PROCEDURE — 82962 GLUCOSE BLOOD TEST: CPT

## 2024-10-30 PROCEDURE — 93880 EXTRACRANIAL BILAT STUDY: CPT

## 2024-10-30 PROCEDURE — 93306 TTE W/DOPPLER COMPLETE: CPT

## 2024-10-30 PROCEDURE — 83036 HEMOGLOBIN GLYCOSYLATED A1C: CPT | Mod: QW

## 2024-10-30 PROCEDURE — 99214 OFFICE O/P EST MOD 30 MIN: CPT

## 2024-10-30 PROCEDURE — G2211 COMPLEX E/M VISIT ADD ON: CPT | Mod: NC

## 2024-10-30 PROCEDURE — 36415 COLL VENOUS BLD VENIPUNCTURE: CPT

## 2024-10-31 LAB
25(OH)D3 SERPL-MCNC: 44.3 NG/ML
ALBUMIN SERPL ELPH-MCNC: 4.6 G/DL
ALP BLD-CCNC: 96 U/L
ALT SERPL-CCNC: 34 U/L
ANION GAP SERPL CALC-SCNC: 14 MMOL/L
AST SERPL-CCNC: 25 U/L
BILIRUB SERPL-MCNC: 0.4 MG/DL
BUN SERPL-MCNC: 22 MG/DL
CALCIUM SERPL-MCNC: 10.3 MG/DL
CHLORIDE SERPL-SCNC: 102 MMOL/L
CO2 SERPL-SCNC: 25 MMOL/L
CREAT SERPL-MCNC: 1 MG/DL
CREAT SPEC-SCNC: 63 MG/DL
EGFR: 63 ML/MIN/1.73M2
ESTIMATED AVERAGE GLUCOSE: 192 MG/DL
GLUCOSE SERPL-MCNC: 119 MG/DL
HBA1C MFR BLD HPLC: 8.3 %
MICROALBUMIN 24H UR DL<=1MG/L-MCNC: 3.1 MG/DL
MICROALBUMIN/CREAT 24H UR-RTO: 50 MG/G
POTASSIUM SERPL-SCNC: 4.7 MMOL/L
PROT SERPL-MCNC: 7.8 G/DL
SODIUM SERPL-SCNC: 141 MMOL/L

## 2024-11-02 ENCOUNTER — RX RENEWAL (OUTPATIENT)
Age: 64
End: 2024-11-02

## 2024-11-11 ENCOUNTER — APPOINTMENT (OUTPATIENT)
Dept: CARDIOLOGY | Facility: CLINIC | Age: 64
End: 2024-11-11
Payer: COMMERCIAL

## 2024-11-11 ENCOUNTER — NON-APPOINTMENT (OUTPATIENT)
Age: 64
End: 2024-11-11

## 2024-11-11 VITALS
WEIGHT: 145 LBS | BODY MASS INDEX: 25.69 KG/M2 | HEIGHT: 63 IN | SYSTOLIC BLOOD PRESSURE: 122 MMHG | OXYGEN SATURATION: 96 % | HEART RATE: 102 BPM | DIASTOLIC BLOOD PRESSURE: 98 MMHG

## 2024-11-11 VITALS — DIASTOLIC BLOOD PRESSURE: 84 MMHG | SYSTOLIC BLOOD PRESSURE: 128 MMHG

## 2024-11-11 DIAGNOSIS — M81.0 AGE-RELATED OSTEOPOROSIS W/OUT CURRENT PATHOLOGICAL FRACTURE: ICD-10-CM

## 2024-11-11 DIAGNOSIS — E78.41 ELEVATED LIPOPROTEIN(A): ICD-10-CM

## 2024-11-11 DIAGNOSIS — R09.89 OTHER SPECIFIED SYMPTOMS AND SIGNS INVOLVING THE CIRCULATORY AND RESPIRATORY SYSTEMS: ICD-10-CM

## 2024-11-11 DIAGNOSIS — Z00.00 ENCOUNTER FOR GENERAL ADULT MEDICAL EXAMINATION W/OUT ABNORMAL FINDINGS: ICD-10-CM

## 2024-11-11 DIAGNOSIS — E11.9 TYPE 2 DIABETES MELLITUS W/OUT COMPLICATIONS: ICD-10-CM

## 2024-11-11 DIAGNOSIS — E78.5 HYPERLIPIDEMIA, UNSPECIFIED: ICD-10-CM

## 2024-11-11 DIAGNOSIS — E04.1 NONTOXIC SINGLE THYROID NODULE: ICD-10-CM

## 2024-11-11 DIAGNOSIS — I10 ESSENTIAL (PRIMARY) HYPERTENSION: ICD-10-CM

## 2024-11-11 LAB
ALBUMIN SERPL ELPH-MCNC: 4.5 G/DL
ALP BLD-CCNC: 90 U/L
ALT SERPL-CCNC: 24 U/L
AST SERPL-CCNC: 17 U/L
BILIRUB DIRECT SERPL-MCNC: 0.1 MG/DL
BILIRUB INDIRECT SERPL-MCNC: 0.2 MG/DL
BILIRUB SERPL-MCNC: 0.3 MG/DL
CHOLEST SERPL-MCNC: 141 MG/DL
CK SERPL-CCNC: 96 U/L
CRP SERPL HS-MCNC: 2.09 MG/L
HDLC SERPL-MCNC: 43 MG/DL
LDLC SERPL CALC-MCNC: 78 MG/DL
LDLC SERPL DIRECT ASSAY-MCNC: 80 MG/DL
NONHDLC SERPL-MCNC: 98 MG/DL
PROT SERPL-MCNC: 7.5 G/DL
TRIGL SERPL-MCNC: 106 MG/DL

## 2024-11-11 PROCEDURE — 99214 OFFICE O/P EST MOD 30 MIN: CPT

## 2024-11-11 PROCEDURE — 93000 ELECTROCARDIOGRAM COMPLETE: CPT

## 2024-11-11 PROCEDURE — G2211 COMPLEX E/M VISIT ADD ON: CPT | Mod: NC

## 2024-11-27 ENCOUNTER — APPOINTMENT (OUTPATIENT)
Dept: PULMONOLOGY | Facility: CLINIC | Age: 64
End: 2024-11-27

## 2024-11-27 ENCOUNTER — APPOINTMENT (OUTPATIENT)
Dept: PULMONOLOGY | Facility: CLINIC | Age: 64
End: 2024-11-27
Payer: COMMERCIAL

## 2024-11-27 ENCOUNTER — RESULT CHARGE (OUTPATIENT)
Age: 64
End: 2024-11-27

## 2024-11-27 VITALS — DIASTOLIC BLOOD PRESSURE: 87 MMHG | HEART RATE: 101 BPM | OXYGEN SATURATION: 96 % | SYSTOLIC BLOOD PRESSURE: 138 MMHG

## 2024-11-27 DIAGNOSIS — R91.1 SOLITARY PULMONARY NODULE: ICD-10-CM

## 2024-11-27 LAB — POCT - HEMOGLOBIN (HGB), QUANTITATIVE, TRANSCUTANEOUS: 11.7

## 2024-11-27 PROCEDURE — 99204 OFFICE O/P NEW MOD 45 MIN: CPT | Mod: 25

## 2024-11-27 PROCEDURE — 94727 GAS DIL/WSHOT DETER LNG VOL: CPT

## 2024-11-27 PROCEDURE — 94010 BREATHING CAPACITY TEST: CPT

## 2024-11-27 PROCEDURE — 94729 DIFFUSING CAPACITY: CPT

## 2024-11-27 PROCEDURE — ZZZZZ: CPT

## 2025-02-17 ENCOUNTER — RX RENEWAL (OUTPATIENT)
Age: 65
End: 2025-02-17

## 2025-03-28 ENCOUNTER — APPOINTMENT (OUTPATIENT)
Dept: ENDOCRINOLOGY | Facility: CLINIC | Age: 65
End: 2025-03-28

## 2025-03-28 VITALS
OXYGEN SATURATION: 98 % | HEART RATE: 105 BPM | BODY MASS INDEX: 23.94 KG/M2 | DIASTOLIC BLOOD PRESSURE: 80 MMHG | HEIGHT: 63 IN | SYSTOLIC BLOOD PRESSURE: 135 MMHG | WEIGHT: 135.13 LBS

## 2025-03-28 DIAGNOSIS — E55.9 VITAMIN D DEFICIENCY, UNSPECIFIED: ICD-10-CM

## 2025-03-28 DIAGNOSIS — I10 ESSENTIAL (PRIMARY) HYPERTENSION: ICD-10-CM

## 2025-03-28 DIAGNOSIS — M81.0 AGE-RELATED OSTEOPOROSIS W/OUT CURRENT PATHOLOGICAL FRACTURE: ICD-10-CM

## 2025-03-28 DIAGNOSIS — E78.5 HYPERLIPIDEMIA, UNSPECIFIED: ICD-10-CM

## 2025-03-28 DIAGNOSIS — E04.1 NONTOXIC SINGLE THYROID NODULE: ICD-10-CM

## 2025-03-28 DIAGNOSIS — R80.9 PROTEINURIA, UNSPECIFIED: ICD-10-CM

## 2025-03-28 DIAGNOSIS — Z79.4 TYPE 2 DIABETES MELLITUS WITH DIABETIC MACULAR EDEMA, RESOLVED FOLLOWING TREATMENT, RIGHT EYE: ICD-10-CM

## 2025-03-28 DIAGNOSIS — E11.37X1 TYPE 2 DIABETES MELLITUS WITH DIABETIC MACULAR EDEMA, RESOLVED FOLLOWING TREATMENT, RIGHT EYE: ICD-10-CM

## 2025-03-28 LAB
GLUCOSE BLDC GLUCOMTR-MCNC: 111
HBA1C MFR BLD HPLC: 7.1

## 2025-03-28 PROCEDURE — 83036 HEMOGLOBIN GLYCOSYLATED A1C: CPT | Mod: QW

## 2025-03-28 PROCEDURE — 82962 GLUCOSE BLOOD TEST: CPT

## 2025-03-28 PROCEDURE — 99214 OFFICE O/P EST MOD 30 MIN: CPT

## 2025-03-28 PROCEDURE — G2211 COMPLEX E/M VISIT ADD ON: CPT | Mod: NC

## 2025-05-27 ENCOUNTER — RX RENEWAL (OUTPATIENT)
Age: 65
End: 2025-05-27

## 2025-06-02 ENCOUNTER — RX RENEWAL (OUTPATIENT)
Age: 65
End: 2025-06-02

## 2025-06-02 RX ORDER — ROSUVASTATIN CALCIUM 10 MG/1
10 TABLET, FILM COATED ORAL
Qty: 90 | Refills: 1 | Status: ACTIVE | COMMUNITY
Start: 2025-06-02 | End: 1900-01-01

## 2025-06-18 ENCOUNTER — APPOINTMENT (OUTPATIENT)
Dept: CARDIOLOGY | Facility: CLINIC | Age: 65
End: 2025-06-18
Payer: COMMERCIAL

## 2025-06-18 ENCOUNTER — NON-APPOINTMENT (OUTPATIENT)
Age: 65
End: 2025-06-18

## 2025-06-18 VITALS
HEIGHT: 63 IN | HEART RATE: 94 BPM | BODY MASS INDEX: 24.31 KG/M2 | OXYGEN SATURATION: 98 % | WEIGHT: 137.2 LBS | DIASTOLIC BLOOD PRESSURE: 97 MMHG | SYSTOLIC BLOOD PRESSURE: 140 MMHG

## 2025-06-18 PROCEDURE — 99214 OFFICE O/P EST MOD 30 MIN: CPT

## 2025-06-18 PROCEDURE — G2211 COMPLEX E/M VISIT ADD ON: CPT | Mod: NC

## 2025-06-18 PROCEDURE — 93000 ELECTROCARDIOGRAM COMPLETE: CPT

## 2025-06-19 LAB
25(OH)D3 SERPL-MCNC: 35.7 NG/ML
ALBUMIN SERPL ELPH-MCNC: 4.5 G/DL
ALP BLD-CCNC: 97 U/L
ALT SERPL-CCNC: 21 U/L
ANA TITR SER: NEGATIVE
ANION GAP SERPL CALC-SCNC: 15 MMOL/L
APPEARANCE: CLEAR
AST SERPL-CCNC: 19 U/L
BACTERIA: NEGATIVE /HPF
BASOPHILS # BLD AUTO: 0.04 K/UL
BASOPHILS NFR BLD AUTO: 0.5 %
BILIRUB DIRECT SERPL-MCNC: 0.09 MG/DL
BILIRUB INDIRECT SERPL-MCNC: 0.2 MG/DL
BILIRUB SERPL-MCNC: 0.2 MG/DL
BILIRUBIN URINE: NEGATIVE
BLOOD URINE: NEGATIVE
BUN SERPL-MCNC: 15 MG/DL
CALCIUM SERPL-MCNC: 10 MG/DL
CAST: 1 /LPF
CCP AB SER IA-ACNC: <8 U/ML
CCP AB SER QL: NEGATIVE
CHLORIDE SERPL-SCNC: 105 MMOL/L
CHOLEST SERPL-MCNC: 154 MG/DL
CK SERPL-CCNC: 196 U/L
CO2 SERPL-SCNC: 23 MMOL/L
COLOR: YELLOW
CREAT SERPL-MCNC: 1 MG/DL
CRP SERPL HS-MCNC: 1.45 MG/L
CRP SERPL-MCNC: <3 MG/L
EGFRCR SERPLBLD CKD-EPI 2021: 63 ML/MIN/1.73M2
EOSINOPHIL # BLD AUTO: 0.1 K/UL
EOSINOPHIL NFR BLD AUTO: 1.2 %
EPITHELIAL CELLS: 0 /HPF
ERYTHROCYTE [SEDIMENTATION RATE] IN BLOOD BY WESTERGREN METHOD: 25 MM/HR
ESTIMATED AVERAGE GLUCOSE: 151 MG/DL
GLUCOSE QUALITATIVE U: NEGATIVE MG/DL
GLUCOSE SERPL-MCNC: 97 MG/DL
HBA1C MFR BLD HPLC: 6.9 %
HCT VFR BLD CALC: 37.2 %
HDLC SERPL-MCNC: 43 MG/DL
HGB BLD-MCNC: 12 G/DL
IMM GRANULOCYTES NFR BLD AUTO: 0.2 %
KETONES URINE: NEGATIVE MG/DL
LDLC SERPL DIRECT ASSAY-MCNC: 82 MG/DL
LDLC SERPL-MCNC: 82 MG/DL
LEUKOCYTE ESTERASE URINE: NEGATIVE
LYMPHOCYTES # BLD AUTO: 1.79 K/UL
LYMPHOCYTES NFR BLD AUTO: 21.2 %
MAN DIFF?: NORMAL
MCHC RBC-ENTMCNC: 28.6 PG
MCHC RBC-ENTMCNC: 32.3 G/DL
MCV RBC AUTO: 88.6 FL
MICROSCOPIC-UA: NORMAL
MONOCYTES # BLD AUTO: 0.78 K/UL
MONOCYTES NFR BLD AUTO: 9.2 %
NEUTROPHILS # BLD AUTO: 5.72 K/UL
NEUTROPHILS NFR BLD AUTO: 67.7 %
NITRITE URINE: NEGATIVE
NONHDLC SERPL-MCNC: 111 MG/DL
PH URINE: 5.5
PLATELET # BLD AUTO: 361 K/UL
POTASSIUM SERPL-SCNC: 4.4 MMOL/L
PROT SERPL-MCNC: 7.5 G/DL
PROTEIN URINE: NEGATIVE MG/DL
RBC # BLD: 4.2 M/UL
RBC # FLD: 15.3 %
RED BLOOD CELLS URINE: 0 /HPF
RHEUMATOID FACT SERPL-ACNC: <10 IU/ML
SODIUM SERPL-SCNC: 143 MMOL/L
SPECIFIC GRAVITY URINE: 1.01
T4 FREE SERPL-MCNC: 1.1 NG/DL
TRIGL SERPL-MCNC: 169 MG/DL
TSH SERPL-ACNC: 1.41 UIU/ML
UROBILINOGEN URINE: 0.2 MG/DL
WBC # FLD AUTO: 8.45 K/UL
WHITE BLOOD CELLS URINE: 0 /HPF